# Patient Record
Sex: FEMALE | Race: WHITE | NOT HISPANIC OR LATINO | URBAN - METROPOLITAN AREA
[De-identification: names, ages, dates, MRNs, and addresses within clinical notes are randomized per-mention and may not be internally consistent; named-entity substitution may affect disease eponyms.]

---

## 2018-04-14 ENCOUNTER — INPATIENT (INPATIENT)
Facility: HOSPITAL | Age: 73
LOS: 5 days | Discharge: HOME | DRG: 438 | End: 2018-04-20
Attending: INTERNAL MEDICINE | Admitting: INTERNAL MEDICINE
Payer: MEDICARE

## 2018-04-14 VITALS
WEIGHT: 199.96 LBS | SYSTOLIC BLOOD PRESSURE: 87 MMHG | HEART RATE: 98 BPM | RESPIRATION RATE: 24 BRPM | OXYGEN SATURATION: 96 % | TEMPERATURE: 98 F | DIASTOLIC BLOOD PRESSURE: 59 MMHG

## 2018-04-14 DIAGNOSIS — C79.9 SECONDARY MALIGNANT NEOPLASM OF UNSPECIFIED SITE: ICD-10-CM

## 2018-04-14 LAB
ALBUMIN SERPL ELPH-MCNC: 2.9 G/DL — LOW (ref 3.3–5)
ALP SERPL-CCNC: 1780 U/L — HIGH (ref 40–120)
ALT FLD-CCNC: 61 U/L RC — HIGH (ref 10–45)
ANION GAP SERPL CALC-SCNC: 17 MMOL/L — SIGNIFICANT CHANGE UP (ref 5–17)
APPEARANCE UR: ABNORMAL
APTT BLD: 28.2 SEC — SIGNIFICANT CHANGE UP (ref 27.5–37.4)
AST SERPL-CCNC: 71 U/L — HIGH (ref 10–40)
BACTERIA # UR AUTO: ABNORMAL /HPF
BASE EXCESS BLDV CALC-SCNC: 3.6 MMOL/L — HIGH (ref -2–2)
BASE EXCESS BLDV CALC-SCNC: 6.1 MMOL/L — HIGH (ref -2–2)
BASOPHILS # BLD AUTO: 0 K/UL — SIGNIFICANT CHANGE UP (ref 0–0.2)
BASOPHILS NFR BLD AUTO: 0.4 % — SIGNIFICANT CHANGE UP (ref 0–2)
BILIRUB SERPL-MCNC: 0.8 MG/DL — SIGNIFICANT CHANGE UP (ref 0.2–1.2)
BILIRUB UR-MCNC: NEGATIVE — SIGNIFICANT CHANGE UP
BUN SERPL-MCNC: 28 MG/DL — HIGH (ref 7–23)
CA-I SERPL-SCNC: 1.17 MMOL/L — SIGNIFICANT CHANGE UP (ref 1.12–1.3)
CA-I SERPL-SCNC: 1.18 MMOL/L — SIGNIFICANT CHANGE UP (ref 1.12–1.3)
CALCIUM SERPL-MCNC: 9.8 MG/DL — SIGNIFICANT CHANGE UP (ref 8.4–10.5)
CHLORIDE BLDV-SCNC: 106 MMOL/L — SIGNIFICANT CHANGE UP (ref 96–108)
CHLORIDE BLDV-SCNC: 106 MMOL/L — SIGNIFICANT CHANGE UP (ref 96–108)
CHLORIDE SERPL-SCNC: 98 MMOL/L — SIGNIFICANT CHANGE UP (ref 96–108)
CO2 BLDV-SCNC: 32 MMOL/L — HIGH (ref 22–30)
CO2 BLDV-SCNC: 34 MMOL/L — HIGH (ref 22–30)
CO2 SERPL-SCNC: 26 MMOL/L — SIGNIFICANT CHANGE UP (ref 22–31)
COLOR SPEC: YELLOW — SIGNIFICANT CHANGE UP
CREAT SERPL-MCNC: 1.25 MG/DL — SIGNIFICANT CHANGE UP (ref 0.5–1.3)
DIFF PNL FLD: ABNORMAL
EOSINOPHIL # BLD AUTO: 0.2 K/UL — SIGNIFICANT CHANGE UP (ref 0–0.5)
EOSINOPHIL NFR BLD AUTO: 2.4 % — SIGNIFICANT CHANGE UP (ref 0–6)
EPI CELLS # UR: SIGNIFICANT CHANGE UP /HPF
GAS PNL BLDV: 133 MMOL/L — LOW (ref 136–145)
GAS PNL BLDV: 136 MMOL/L — SIGNIFICANT CHANGE UP (ref 136–145)
GAS PNL BLDV: SIGNIFICANT CHANGE UP
GLUCOSE BLDV-MCNC: 60 MG/DL — LOW (ref 70–99)
GLUCOSE BLDV-MCNC: 69 MG/DL — LOW (ref 70–99)
GLUCOSE SERPL-MCNC: 60 MG/DL — LOW (ref 70–99)
GLUCOSE UR QL: NEGATIVE — SIGNIFICANT CHANGE UP
HCO3 BLDV-SCNC: 30 MMOL/L — HIGH (ref 21–29)
HCO3 BLDV-SCNC: 32 MMOL/L — HIGH (ref 21–29)
HCT VFR BLD CALC: 36 % — SIGNIFICANT CHANGE UP (ref 34.5–45)
HCT VFR BLDA CALC: 33 % — LOW (ref 39–50)
HCT VFR BLDA CALC: 36 % — LOW (ref 39–50)
HGB BLD CALC-MCNC: 10.8 G/DL — LOW (ref 11.5–15.5)
HGB BLD CALC-MCNC: 11.7 G/DL — SIGNIFICANT CHANGE UP (ref 11.5–15.5)
HGB BLD-MCNC: 12 G/DL — SIGNIFICANT CHANGE UP (ref 11.5–15.5)
HYALINE CASTS # UR AUTO: ABNORMAL
INR BLD: 1.17 RATIO — HIGH (ref 0.88–1.16)
KETONES UR-MCNC: NEGATIVE — SIGNIFICANT CHANGE UP
LACTATE BLDV-MCNC: 2.9 MMOL/L — HIGH (ref 0.7–2)
LACTATE BLDV-MCNC: 2.9 MMOL/L — HIGH (ref 0.7–2)
LEUKOCYTE ESTERASE UR-ACNC: ABNORMAL
LIDOCAIN IGE QN: 257 U/L — HIGH (ref 7–60)
LYMPHOCYTES # BLD AUTO: 1.5 K/UL — SIGNIFICANT CHANGE UP (ref 1–3.3)
LYMPHOCYTES # BLD AUTO: 15.7 % — SIGNIFICANT CHANGE UP (ref 13–44)
MCHC RBC-ENTMCNC: 31.3 PG — SIGNIFICANT CHANGE UP (ref 27–34)
MCHC RBC-ENTMCNC: 33.4 GM/DL — SIGNIFICANT CHANGE UP (ref 32–36)
MCV RBC AUTO: 93.8 FL — SIGNIFICANT CHANGE UP (ref 80–100)
MONOCYTES # BLD AUTO: 0.9 K/UL — SIGNIFICANT CHANGE UP (ref 0–0.9)
MONOCYTES NFR BLD AUTO: 9.4 % — SIGNIFICANT CHANGE UP (ref 2–14)
NEUTROPHILS # BLD AUTO: 7 K/UL — SIGNIFICANT CHANGE UP (ref 1.8–7.4)
NEUTROPHILS NFR BLD AUTO: 72.1 % — SIGNIFICANT CHANGE UP (ref 43–77)
NITRITE UR-MCNC: NEGATIVE — SIGNIFICANT CHANGE UP
OTHER CELLS CSF MANUAL: 4 ML/DL — LOW (ref 18–22)
PCO2 BLDV: 55 MMHG — HIGH (ref 35–50)
PCO2 BLDV: 57 MMHG — HIGH (ref 35–50)
PH BLDV: 7.34 — LOW (ref 7.35–7.45)
PH BLDV: 7.38 — SIGNIFICANT CHANGE UP (ref 7.35–7.45)
PH UR: 6.5 — SIGNIFICANT CHANGE UP (ref 5–8)
PLATELET # BLD AUTO: 375 K/UL — SIGNIFICANT CHANGE UP (ref 150–400)
PO2 BLDV: 26 MMHG — SIGNIFICANT CHANGE UP (ref 25–45)
PO2 BLDV: 32 MMHG — SIGNIFICANT CHANGE UP (ref 25–45)
POTASSIUM BLDV-SCNC: 3.9 MMOL/L — SIGNIFICANT CHANGE UP (ref 3.5–5)
POTASSIUM BLDV-SCNC: 4.7 MMOL/L — SIGNIFICANT CHANGE UP (ref 3.5–5)
POTASSIUM SERPL-MCNC: 4.3 MMOL/L — SIGNIFICANT CHANGE UP (ref 3.5–5.3)
POTASSIUM SERPL-SCNC: 4.3 MMOL/L — SIGNIFICANT CHANGE UP (ref 3.5–5.3)
PROT SERPL-MCNC: 7.3 G/DL — SIGNIFICANT CHANGE UP (ref 6–8.3)
PROT UR-MCNC: 100 MG/DL
PROTHROM AB SERPL-ACNC: 12.8 SEC — HIGH (ref 9.8–12.7)
RBC # BLD: 3.84 M/UL — SIGNIFICANT CHANGE UP (ref 3.8–5.2)
RBC # FLD: 14.1 % — SIGNIFICANT CHANGE UP (ref 10.3–14.5)
RBC CASTS # UR COMP ASSIST: SIGNIFICANT CHANGE UP /HPF (ref 0–2)
SAO2 % BLDV: 30 % — LOW (ref 67–88)
SAO2 % BLDV: 52 % — LOW (ref 67–88)
SODIUM SERPL-SCNC: 141 MMOL/L — SIGNIFICANT CHANGE UP (ref 135–145)
SP GR SPEC: 1.02 — SIGNIFICANT CHANGE UP (ref 1.01–1.02)
UROBILINOGEN FLD QL: NEGATIVE — SIGNIFICANT CHANGE UP
WBC # BLD: 9.7 K/UL — SIGNIFICANT CHANGE UP (ref 3.8–10.5)
WBC # FLD AUTO: 9.7 K/UL — SIGNIFICANT CHANGE UP (ref 3.8–10.5)
WBC UR QL: >50 /HPF (ref 0–5)

## 2018-04-14 PROCEDURE — 93308 TTE F-UP OR LMTD: CPT | Mod: 26

## 2018-04-14 PROCEDURE — 74177 CT ABD & PELVIS W/CONTRAST: CPT | Mod: 26

## 2018-04-14 PROCEDURE — 99223 1ST HOSP IP/OBS HIGH 75: CPT

## 2018-04-14 PROCEDURE — 93010 ELECTROCARDIOGRAM REPORT: CPT

## 2018-04-14 PROCEDURE — 71260 CT THORAX DX C+: CPT | Mod: 26

## 2018-04-14 PROCEDURE — 70470 CT HEAD/BRAIN W/O & W/DYE: CPT | Mod: 26

## 2018-04-14 PROCEDURE — 71045 X-RAY EXAM CHEST 1 VIEW: CPT | Mod: 26

## 2018-04-14 PROCEDURE — 99291 CRITICAL CARE FIRST HOUR: CPT

## 2018-04-14 RX ORDER — PIPERACILLIN AND TAZOBACTAM 4; .5 G/20ML; G/20ML
3.38 INJECTION, POWDER, LYOPHILIZED, FOR SOLUTION INTRAVENOUS EVERY 8 HOURS
Qty: 0 | Refills: 0 | Status: DISCONTINUED | OUTPATIENT
Start: 2018-04-14 | End: 2018-04-19

## 2018-04-14 RX ORDER — PIPERACILLIN AND TAZOBACTAM 4; .5 G/20ML; G/20ML
3.38 INJECTION, POWDER, LYOPHILIZED, FOR SOLUTION INTRAVENOUS ONCE
Qty: 0 | Refills: 0 | Status: COMPLETED | OUTPATIENT
Start: 2018-04-14 | End: 2018-04-14

## 2018-04-14 RX ORDER — DEXTROSE 50 % IN WATER 50 %
25 SYRINGE (ML) INTRAVENOUS ONCE
Qty: 0 | Refills: 0 | Status: COMPLETED | OUTPATIENT
Start: 2018-04-14 | End: 2018-04-14

## 2018-04-14 RX ORDER — DEXAMETHASONE 0.5 MG/5ML
4 ELIXIR ORAL EVERY 6 HOURS
Qty: 0 | Refills: 0 | Status: DISCONTINUED | OUTPATIENT
Start: 2018-04-14 | End: 2018-04-19

## 2018-04-14 RX ORDER — SODIUM CHLORIDE 9 MG/ML
1000 INJECTION INTRAMUSCULAR; INTRAVENOUS; SUBCUTANEOUS ONCE
Qty: 0 | Refills: 0 | Status: COMPLETED | OUTPATIENT
Start: 2018-04-14 | End: 2018-04-14

## 2018-04-14 RX ORDER — ACETAMINOPHEN 500 MG
1000 TABLET ORAL ONCE
Qty: 0 | Refills: 0 | Status: COMPLETED | OUTPATIENT
Start: 2018-04-14 | End: 2018-04-14

## 2018-04-14 RX ORDER — IPRATROPIUM/ALBUTEROL SULFATE 18-103MCG
3 AEROSOL WITH ADAPTER (GRAM) INHALATION ONCE
Qty: 0 | Refills: 0 | Status: COMPLETED | OUTPATIENT
Start: 2018-04-14 | End: 2018-04-14

## 2018-04-14 RX ADMIN — Medication 400 MILLIGRAM(S): at 13:57

## 2018-04-14 RX ADMIN — Medication 25 MILLILITER(S): at 16:03

## 2018-04-14 RX ADMIN — Medication 4 MILLIGRAM(S): at 22:45

## 2018-04-14 RX ADMIN — Medication 3 MILLILITER(S): at 13:57

## 2018-04-14 RX ADMIN — SODIUM CHLORIDE 1000 MILLILITER(S): 9 INJECTION INTRAMUSCULAR; INTRAVENOUS; SUBCUTANEOUS at 14:21

## 2018-04-14 RX ADMIN — PIPERACILLIN AND TAZOBACTAM 200 GRAM(S): 4; .5 INJECTION, POWDER, LYOPHILIZED, FOR SOLUTION INTRAVENOUS at 16:57

## 2018-04-14 RX ADMIN — SODIUM CHLORIDE 1000 MILLILITER(S): 9 INJECTION INTRAMUSCULAR; INTRAVENOUS; SUBCUTANEOUS at 16:57

## 2018-04-14 RX ADMIN — Medication 1000 MILLIGRAM(S): at 16:35

## 2018-04-14 NOTE — CONSULT NOTE ADULT - SUBJECTIVE AND OBJECTIVE BOX
72F past medical history ovarian CA s/p hysterectomy 10 years ago, pre-diabetic, HTN, COPD, biliary stent with fever, peritoneal carcinomatosis presents from home after RN saw patient with shortness of breath and blurry vision. Patient c/o abdominal distension. Denies fever. Blurry vision has been occurring for past month, patient has been having increasing difficulty with reading at home. Daughter at bedside.    CTH: left temporal vasogenic edema, small focus    MEDICATIONS  (STANDING):  dexamethasone     Tablet 4 milliGRAM(s) Oral every 6 hours    ICU Vital Signs Last 24 Hrs  T(C): 36.9 (14 Apr 2018 18:27), Max: 37.1 (14 Apr 2018 16:56)  T(F): 98.4 (14 Apr 2018 18:27), Max: 98.8 (14 Apr 2018 16:56)  HR: 83 (14 Apr 2018 18:27) (83 - 98)  BP: 123/77 (14 Apr 2018 18:27) (87/59 - 123/77)  BP(mean): --  ABP: --  ABP(mean): --  RR: 18 (14 Apr 2018 18:27) (18 - 24)  SpO2: 97% (14 Apr 2018 18:27) (96% - 97%)        Exam:  Alert, oriented x 3  EOMI  PERRLA  FC  No pronator drift  5/5 UE and LE  abdominal distension

## 2018-04-14 NOTE — ED PROVIDER NOTE - PROGRESS NOTE DETAILS
Attending Vasquez: pt awaiting CT scans, afebrile. Attending Vasquez: UA positive for infection, given abx Braden PGY3: ct concerning for cholecystitis, surgery consulted and will likely recommend perc choley with IR. COncern for metastatic disease on CT head. neurosurg called and will see the patient and likely recommend steroids as per phone conversation Braden PGY3: after numerous attempts to contact Dr. Pablo, he called back as I was in a code acls. unable to talk to him. awaiting Dr. Pablo for admission Braden PGY3: admitted to [facility] and informed of the consults called and plan.

## 2018-04-14 NOTE — CONSULT NOTE ADULT - ASSESSMENT
72F with metastatic ovarian ca with biliary colic s/p CBD stent  -medicine consult  -NPO/IVF  -antibiotics  -abdominal exams  -Given advanced/metastatic disease, patient is a poor surgical candidate. If patient's pain/abdominal exam does not improve on antibiotics, will need IR percutaneous cholecystostomy.  -will follow  -d/w Dr. Cool 72F with metastatic ovarian ca with biliary colic s/p CBD stent  -medicine consult  -GI consult given biliary stent/recent procedure and abdominal pain began after stent placement  -NPO/IVF  -antibiotics  -abdominal exams  -Given advanced/metastatic disease, patient is a poor surgical candidate. If patient's pain/abdominal exam does not improve on antibiotics, will need IR percutaneous cholecystostomy.  -will follow  -d/w Dr. Cool

## 2018-04-14 NOTE — ED PROVIDER NOTE - CARE PLAN
Principal Discharge DX:	Pleural effusion  Secondary Diagnosis:	Cholecystitis  Secondary Diagnosis:	Metastatic disease

## 2018-04-14 NOTE — ED PROVIDER NOTE - MEDICAL DECISION MAKING DETAILS
Attending Vasquez: 71 y/o female with h/o metastatic cancer, copd on 3L home oxygen, s/p recent biliary stent presenting with abdominal pain. pain diffuse, worse in suprapubic area. reportedly had some sob earlier but currently resolved. no fevers or productive cough. initial BP in triage decreased however when recheck improved. on exam pt with distended abd, ttp RUQ and suprapubic area. pocus shows multiple gallstones and gbwt however pt without sonographic murphys. will obtain ct to further evaluate. additionally tp with sojme dysuria. UA ordered to further evaluate. less likely pe as no pleuritic pain and sob currently resolved. additionally pt reports transient blurry vision. with h/o metastatic ca will obtain ct head to evaluate for intracranial mets. will obtain labs, ct ab/pel, ua, consider HIDA. pt likely tba

## 2018-04-14 NOTE — H&P ADULT - NSHPLABSRESULTS_GEN_ALL_CORE
Labs personally reviewed.                        12.0   9.7   )-----------( 375      ( 2018 13:59 )             36.0     -    141  |  98  |  28<H>  ----------------------------<  60<L>  4.3   |  26  |  1.25    Ca    9.8      2018 13:59    TPro  7.3  /  Alb  2.9<L>  /  TBili  0.8  /  DBili  x   /  AST  71<H>  /  ALT  61<H>  /  AlkPhos  1780<H>          LIVER FUNCTIONS - ( 2018 13:59 )  Alb: 2.9 g/dL / Pro: 7.3 g/dL / ALK PHOS: 1780 U/L / ALT: 61 U/L RC / AST: 71 U/L / GGT: x           PT/INR - ( 2018 13:59 )   PT: 12.8 sec;   INR: 1.17 ratio         PTT - ( 2018 13:59 )  PTT:28.2 sec  Urinalysis Basic - ( 2018 16:55 )    Color: Yellow / Appearance: SL Turbid / S.024 / pH: x  Gluc: x / Ketone: Negative  / Bili: Negative / Urobili: Negative   Blood: x / Protein: 100 mg/dL / Nitrite: Negative   Leuk Esterase: Large / RBC: 3-5 /HPF / WBC >50 /HPF   Sq Epi: x / Non Sq Epi: OCC /HPF / Bacteria: Few /HPF      Imaging personally reviewed.      Tracing personally reviewed. Labs personally reviewed.                        12.0   9.7   )-----------( 375      ( 2018 13:59 )             36.0     -    141  |  98  |  28<H>  ----------------------------<  60<L>  4.3   |  26  |  1.25    Ca    9.8      2018 13:59    TPro  7.3  /  Alb  2.9<L>  /  TBili  0.8  /  DBili  x   /  AST  71<H>  /  ALT  61<H>  /  AlkPhos  1780<H>      LIVER FUNCTIONS - ( 2018 13:59 )  Alb: 2.9 g/dL / Pro: 7.3 g/dL / ALK PHOS: 1780 U/L / ALT: 61 U/L RC / AST: 71 U/L / GGT: x         PT/INR - ( 2018 13:59 )   PT: 12.8 sec;   INR: 1.17 ratio      PTT - ( 2018 13:59 )  PTT:28.2 sec  Urinalysis Basic - ( 2018 16:55 )    Color: Yellow / Appearance: SL Turbid / S.024 / pH: x  Gluc: x / Ketone: Negative  / Bili: Negative / Urobili: Negative   Blood: x / Protein: 100 mg/dL / Nitrite: Negative   Leuk Esterase: Large / RBC: 3-5 /HPF / WBC >50 /HPF   Sq Epi: x / Non Sq Epi: OCC /HPF / Bacteria: Few /HPF    Imaging personally reviewed.      Tracing personally reviewed. Labs personally reviewed.                        12.0   9.7   )-----------( 375      ( 2018 13:59 )             36.0     -    141  |  98  |  28<H>  ----------------------------<  60<L>  4.3   |  26  |  1.25    Ca    9.8      2018 13:59    TPro  7.3  /  Alb  2.9<L>  /  TBili  0.8  /  DBili  x   /  AST  71<H>  /  ALT  61<H>  /  AlkPhos  1780<H>      LIVER FUNCTIONS - ( 2018 13:59 )  Alb: 2.9 g/dL / Pro: 7.3 g/dL / ALK PHOS: 1780 U/L / ALT: 61 U/L RC / AST: 71 U/L / GGT: x         PT/INR - ( 2018 13:59 )   PT: 12.8 sec;   INR: 1.17 ratio      PTT - ( 2018 13:59 )  PTT:28.2 sec  Urinalysis Basic - ( 2018 16:55 )    Color: Yellow / Appearance: SL Turbid / S.024 / pH: x  Gluc: x / Ketone: Negative  / Bili: Negative / Urobili: Negative   Blood: x / Protein: 100 mg/dL / Nitrite: Negative   Leuk Esterase: Large / RBC: 3-5 /HPF / WBC >50 /HPF   Sq Epi: x / Non Sq Epi: OCC /HPF / Bacteria: Few /HPF    Imaging personally reviewed.      CHEST:     LUNGS AND LARGE AIRWAYS: Trachea and mainstem bronchi are patent.   Compressive atelectasis of the right lower lobe secondary to pleural   fluid. Mild interlobular septal thickening of the upper lobes. Mild soft   tissue thickening along the right major fissure, possibly atelectasis   secondary to adjacent pleural fluid. Juxtapleural solid nodule of the   left lower lobe measuring 7 mm, series 2 image 40. Calcified granuloma   left upper lobe.  PLEURA: Moderate right pleural effusion and trace left pleural effusion,   new since 2013..  VESSELS: Mediport tip at the distal SVC. Atheromatous changes of the   thoracic aorta and coronary arteries.  HEART: Heart size is normal. No pericardial effusion. Aortic valve   calcification.  MEDIASTINUM AND RAMIRO: Less than 1 cm mediastinal lymph nodes  CHEST WALL AND LOWER NECK: Right chest wall Mediport.    ABDOMEN AND PELVIS:    LIVER: Heterogeneous enhancement adjacent to the gallbladder fossa.   Peripheral branching air may represent pneumobilia versus, somewhat less   likely, portal venous gas.   BILE DUCTS: Largely air-filled CBD stent. Distally near the ampulla, the   stent is filled with fluid or soft tissue. Trace left-sided pneumobilia.   GALLBLADDER: Filled with stones, one of which appears to be in the neck   of the gallbladder.  Thickened wall.  SPLEEN: Within normal limits.  PANCREAS: Marked peripancreatic inflammatory change. Area of low   attenuation along the anterior margin of the pancreatic body measures 3.1   x 2.0 cm, series 2 image 86, indeterminate for serosal metastases versus   loculated peripancreatic fluid.  ADRENALS: Enlarged bilateral adrenal glands with low-attenuation lesions   measuring 2.4 x 2.0 cm on the left and 1.7 x 1.5 cm on the right, highly   concerning for metastases.  KIDNEYS/URETERS: No hydronephrosis.    BLADDER: Within normal limits.  REPRODUCTIVE ORGANS: Hysterectomy. No adnexal mass.    BOWEL: No significant bowel distention or discrete transition. The   descending colon is largely fluid-filled. Mild irregular thickening along   the hepatic flexure and proximal transverse colon may be sequelae of   serosal disease. No evidence of bowel pneumatosis. Colonic diverticulosis   without diverticulitis. Normal appendix.  PERITONEUM: Moderate ascites. Soft tissue nodularity along the anterior   omental soft tissue, particularly in the left upper quadrant is   concerning for carcinomatosis.  VESSELS:  Patent main portal vein, SMV and splenic vein. Atheromatous   changes of the abdominal aorta.  RETROPERITONEUM: Previously described enlarged retroperitoneal nodes are   markedly decreased in size as compared to prior study from 2013.   Left para-aortic lymph node node conglomerate measures 2.1 x 1.4 cm,   series 2 image 105, previously 3.4 x 2.6 cm.    ABDOMINAL WALL: Within normal limits.  BONES: Diffuse osseous demineralization and degenerative change.    Tracing personally reviewed.

## 2018-04-14 NOTE — H&P ADULT - PROBLEM SELECTOR PLAN 1
--NPO for now  --pain control with morphine 2mg IV q4h PRN for moderate, 4mg IV q4h PRN for severe, tylenol for mild  --patient received 2L NS in ED. Continue NS @ 125, no signs of heart failure.   --appreciate surgery c/s, should consult IR in AM for perc cholecystostomy  --at the moment, etiology of obstruction is unclear. Should also consider GI c/s. --NPO for now  --pain control with morphine 2mg IV q4h PRN for moderate, 4mg IV q4h PRN for severe, tylenol for mild  --patient received 2L NS in ED. Continue NS @ 125, no signs of heart failure.   --holding lasix for now  --appreciate surgery c/s, should consult IR in AM for perc cholecystostomy  --at the moment, etiology of obstruction is unclear. Should also consider GI c/s.

## 2018-04-14 NOTE — CONSULT NOTE ADULT - ATTENDING COMMENTS
I reviewed the resident's note and agree. The patient is a 72-year-old female with a history of metastatic ovarian cancer s/p resection and chemotherapy who presents with blurry vision, shortness of breath, and abdominal pain, found via CT to have a hyperdense left temporal lobe lesion with vasogenic edema suspicious for a metastasis, right greater than left pleural effusions, and abdominal ascites concerning for peritoneal carcinomatosis. The patient is currently neurologically stable. No acute neurosurgical intervention is indicated at this time. Recommend an MRI of the brain with and without contrast for further evaluation of the left temporal lobe lesion. Recommend steroids to decrease vasogenic edema. F/U Oncology and Radiation Oncology recommendations. I saw and evaluated the patient. I reviewed the resident's note and agree. The patient is a 72-year-old female with a history of metastatic ovarian cancer s/p resection and chemotherapy who presents with blurry vision, shortness of breath, and abdominal pain, found via CT to have a hyperdense left temporal lobe lesion with vasogenic edema suspicious for a metastasis, right greater than left pleural effusions, and abdominal ascites concerning for peritoneal carcinomatosis. The patient is currently neurologically stable. No acute neurosurgical intervention is indicated at this time. Recommend an MRI of the brain with and without contrast for further evaluation of the left temporal lobe lesion. Recommend steroids to decrease vasogenic edema. F/U Oncology and Radiation Oncology recommendations.

## 2018-04-14 NOTE — ED PROVIDER NOTE - PHYSICAL EXAMINATION
Attending Vasquez: Gen: NAD, heent: atrauamtic, eomi, perrla, mmm, op pink, uvula midline, neck; nttp, no nuchal rigidity, chest: nttp, no crepitus, cv: rrr, no murmurs, lungs: decreased bs b/l, abd: soft, distended ttp umbilical areas, +BS, no guarding, ext: wwp, neg homans, skin: no rash, neuro: awake and alert, following commands, speech clear, sensation and strength intact, no focal deficits

## 2018-04-14 NOTE — H&P ADULT - ASSESSMENT
This is a 72 year old female with metastatic ovarian cancer (Dx 2008, s/p DENNY/BSO, chemo with recurrence 2017 and repeat chemo, last 12/2017), COPD, presenting with multiple complaints, including abdominal pain, shortness of breath and blurry vision.  Regarding abdominal pain, patient with multiple possible etiologies for pain, including biliary colic given stones present and in neck, pancreatitis which is likely also 2/2 cholestasis, and evolving ascites which is likely malignant. Etiology of cholestasis is unclear, possibly gallstone pancreatitis given GB findings. Per radiology, no obvious obstructing lesions at the moment. Suspect that pneumobilia is 2/2 stent and not a reflection of infection given clinical presentation, though GB wall thickening could suggest cholecystitis. Shortness of breath is likely related to evolving pleural effusion, and given no discreet R sided pulmonary lesion, suspect that pleural effusion may be continuous with abdominal ascites. Patient with new intracranial lesion c/f metastasis.

## 2018-04-14 NOTE — CONSULT NOTE ADULT - ATTENDING COMMENTS
I saw and examined the pt and discussed the tx plan with the House Staff. I agree with the exam and plan as documented in the above consult with comments below.  It appears reasonable that a perc lexi tube would improve the pt's symptoms (given large stone at the neck), however it may cause issues with leaking ascites and superinfection of the ascites. In addition, it is possible that the pt's pain is d/t peritoneal carcinomatosis. Would get IR input in am.   Wendy Keyes MD I saw and examined the pt and discussed the tx plan with the House Staff. I agree with the exam and plan as documented in the above consult with comments below.  It appears reasonable that a perc lexi tube should address the gallbladder issue, however it may cause problems with leaking ascites and superinfection of the ascites. Of note, the pt is mainly c/o lower abd pain and bloating, which is likely d/t her ascites and peritoneal carcinomatosis. Blood cx with GNR. Given overall not straightforward picture, I would obtain a HIDA. Would also get IR input in am. It is possible that paracentesis may help with her symptoms as well.   Wendy Keyes MD

## 2018-04-14 NOTE — CONSULT NOTE ADULT - SUBJECTIVE AND OBJECTIVE BOX
General Surgery Consult  Consulting surgical team: Green team surgery [phone]  Consulting attending: Dr. Omer Cool    HPI: 72F with metastatic recurrent ovarian cancer, COPD, presenting with abdominal pain. Patient reports she was previously admitted to [facility] for 'gallbladder pain'. At that time surgery and GI were consulted and decision was made to place biliary stent. Patient also had jaundice which resolved after stent placement. Since placement of her biliary stent (3 weeks ago) patient has been having abdominal pain. Pain is no worse but she presents today due to ongoing pain. Denies nausea/vomiting or worsening pain with PO intake. Reports decreased appetite and weakness, 10 lb weight loss over the past month. No fevers/chills. Per daughter patient would like second opinion so she came to [facility] rather than returning to [facility].    Patient was diagnosed with ovarian ca approx 10 yrs ago and underwent TAHBSO and chemo. She was found to have recurrent disease last year and underwent chemo which ended Dec 2017. Patient is supposed to get chemo but is pending resolution of her gallbladder symptoms.     PAST MEDICAL HISTORY:  Ovarian ca s/p TAHBSO and chemo 10yrs ago now with recurrent disease  COPD    PAST SURGICAL HISTORY:  TAHBSO    MEDICATIONS:  dexamethasone     Tablet 4 milliGRAM(s) Oral every 6 hours      ALLERGIES:  No Known Allergies      VITALS & I/Os:  Vital Signs Last 24 Hrs  T(C): 36.9 (2018 18:27), Max: 37.1 (2018 16:56)  T(F): 98.4 (2018 18:27), Max: 98.8 (2018 16:56)  HR: 83 (2018 18:27) (83 - 98)  BP: 123/77 (2018 18:27) (87/59 - 123/77)  BP(mean): --  RR: 18 (2018 18:27) (18 - 24)  SpO2: 97% (2018 18:27) (96% - 97%)    PHYSICAL EXAM:  General: No acute distress  Respiratory: Nonlabored  Cardiovascular: Regular rate and rhythm.   Abdominal: Soft, nondistended, minimally tender in RUQ and lower abdomen. No rebound or guarding. No organomegaly, no palpable mass.  Extremities: Warm    LABS:                        12.0   9.7   )-----------( 375      ( 2018 13:59 )             36.0         141  |  98  |  28<H>  ----------------------------<  60<L>  4.3   |  26  |  1.25    Ca    9.8      2018 13:59    TPro  7.3  /  Alb  2.9<L>  /  TBili  0.8  /  DBili  x   /  AST  71<H>  /  ALT  61<H>  /  AlkPhos  1780<H>      Lactate:   @ 15:43  2.9   @ 13:59  2.9    PT/INR - ( 2018 13:59 )   PT: 12.8 sec;   INR: 1.17 ratio         PTT - ( 2018 13:59 )  PTT:28.2 sec          Urinalysis Basic - ( 2018 16:55 )    Color: Yellow / Appearance: SL Turbid / S.024 / pH: x  Gluc: x / Ketone: Negative  / Bili: Negative / Urobili: Negative   Blood: x / Protein: 100 mg/dL / Nitrite: Negative   Leuk Esterase: Large / RBC: 3-5 /HPF / WBC >50 /HPF   Sq Epi: x / Non Sq Epi: OCC /HPF / Bacteria: Few /HPF        IMAGING:  CT Abdomen and Pelvis w/ Oral Cont and w/ IV Cont (18 @ 16:46) >  IMPRESSION:     No bowel obstruction.    Peripancreatic inflammatory change with 3.1 x 2.0 cm low-density lesion   along the anterior body concerning for serosal metastasis. Acute   pancreatitis could have a similar appearance. Correlate with physical   exam and lipase level.    Distended gallbladder with numerous stones, one of which appears to be in   the neck of the gallbladder. Thickening of the gallbladder wall.   Ultrasound or DISIDA scan may be obtained to assess for acute   cholecystitis.    Pneumobilia, consistent with the presence of a biliary stent. Additional   peripheral linear foci of gas in the left lobe of the liver may also be   related to pneumobilia versus, somewhat less likely, portal venous gas.     Bilateral adrenal masses, suspicious for metastatic disease.    Moderate ascites. Concern for peritoneal carcinomatosis.    Moderate right pleural effusion with associated compressiveatelectasis.

## 2018-04-14 NOTE — H&P ADULT - NSHPREVIEWOFSYSTEMS_GEN_ALL_CORE
CONSTITUTIONAL: No weakness, fevers or chills  EYES/ENT: No visual changes;  No dysphagia  NECK: No pain or stiffness  RESPIRATORY: No cough, wheezing, hemoptysis; No shortness of breath  CARDIOVASCULAR: No chest pain or palpitations; No lower extremity edema  GASTROINTESTINAL: No abdominal or epigastric pain. No nausea, vomiting, or hematemesis; No diarrhea or constipation. No melena or hematochezia.  BACK: No back pain  GENITOURINARY: No dysuria, frequency or hematuria  NEUROLOGICAL: No numbness or weakness  SKIN: No itching, burning, rashes, or lesions   All other review of systems is negative unless indicated above. CONSTITUTIONAL: No weakness, fevers or chills  EYES/ENT: +visual changes;  No dysphagia  NECK: No pain or stiffness  RESPIRATORY: No cough, wheezing, hemoptysis; +shortness of breath  CARDIOVASCULAR: No chest pain or palpitations; No lower extremity edema  GASTROINTESTINAL: +abdominal -epigastric pain. No nausea, vomiting, or hematemesis; No diarrhea, +constipation. No melena or hematochezia.  BACK: No back pain  GENITOURINARY: No dysuria, frequency or hematuria  NEUROLOGICAL: No numbness or weakness  SKIN: No itching, burning, rashes, or lesions   All other review of systems is negative unless indicated above.

## 2018-04-14 NOTE — H&P ADULT - PROBLEM SELECTOR PLAN 3
--suspect that this may be continuous with abdominal ascites  --consider diagnostic/therpeutic thora, could also address with IR  --no consolidation to suggest PNA, parapneumonic effusion

## 2018-04-14 NOTE — CONSULT NOTE ADULT - ASSESSMENT
72F with history of ovarian CA p/w abdominal ascities / peritoneal carcinomatosis, CTH shows evidence of left temporal vasogenic edema  -medicine evaluation appreciated  -MRI w/ wo contrast when possible of head  -decadron 4Q6  -radiation/oncology evaluation

## 2018-04-14 NOTE — ED ADULT NURSE NOTE - OBJECTIVE STATEMENT
71 yo female presents to the ED from home c/o SOB, blurry vision and abdominal pain and distention today. patient states she woke up this morning feeling SOB and checked O2 which was 87% on RA with blurry vision. patient arrives to the ED with O2= 96% on 3L NC. patient states she uses home O2 as needed, 3L NC. NSR on the monitor with RR= 22-25. patient is AAOx3. lung sounds wheezes bilaterally. cap refill <3sec. patient c/o upper left and bilateral lower abdominal pain 4/10 with tenderness to touch and distention noted. last BM was yesterday, constipated as per patient. patient also c/o dysuria. patient states she had biliary stent placed on 3/21. patient has PMH ovarian cancer diagnosed 10 years ago. last chemo was March 2017. port of upper right chest wall was last accessed March 2017. patient denies fevers, chills, N/V/D, HA, dizziness, chest pain, cough, hematuria, blood in stool. MD at the bedside.

## 2018-04-14 NOTE — H&P ADULT - HISTORY OF PRESENT ILLNESS
This is a 72 year old female with metastatic ovarian cancer (Dx 2008, s/p DENNY/BSO, chemo with recurrence 2017 and repeat chemo, last 12/2017), COPD, presenting with abdominal pain. Patient was recently admitted to [facility] for 'gallbladder pain'. and jaundice. At that time surgery and GI were consulted and biliary stent was placed.  Since placement of her biliary stent (3 weeks ago) patient has been having persistent abdominal pain. Denies nausea/vomiting or worsening pain with PO intake. Reports decreased appetite and weakness,10 lb weight loss over the past month. Denies fevers/chills. Patient does note blurry vision for the past month, increasing difficulty with reading at home.    In ED, Tmax 98.8, HR 81, BP 87//77, satting well on RA. Labs notable for WBC 9.7, Hgb 12, Plt 375, BUN 28, Cr 1.25 (previous BL 0.7), LFT notable for albumin 2.9, AST 71, ALT 61, Tbili 0.8, AP 1780, INR 1.17, UA with SG 1.024, large LE, >50 WBC. CT head, chest, abdomen/pelvis showed peripancreatic inflammatory change with 3.1 x 2.0 cm low-density lesion along the anterior body concerning for serosal metastasis, acute   pancreatitis could have a similar appearance, distended gallbladder with numerous stones, one of which appears to be in the neck of the gallbladder, thickening of the gallbladder wall, pneumobilia, consistent with the presence of a biliary stent,   peripheral linear foci of gas in the left lobe of the liver may also be   related to pneumobilia versus, somewhat less likely, portal venous gas, blateral adrenal masses, suspicious for metastatic disease, moderate ascites c/f peritoneal carcinomatosis, moderate right pleural effusion with associated compressive atelectasis. Patient was seen by neurosurgery, general surgery. Received zosyn, 2L NS, IV tylenol, duonebs, decadron 4mg q6h. This is a 72 year old female with metastatic ovarian cancer (Dx 2008, s/p DENNY/BSO, chemo with recurrence 2017 and repeat chemo, last 12/2017), COPD, presenting with abdominal pain. Patient was recently admitted to [facility] for 'gallbladder pain'. and jaundice. At that time surgery and GI were consulted and biliary stent was placed.  Since placement of her biliary stent (3 weeks ago) patient has been having persistent abdominal pain. Denies nausea/vomiting or worsening pain with PO intake. Reports decreased appetite and weakness,10 lb weight loss over the past month. Denies fevers/chills. Patient does note blurry vision for the past month, increasing difficulty with reading at home.    In ED, Tmax 98.8, HR 81, BP 87//77, satting well on RA. Labs notable for WBC 9.7, Hgb 12, Plt 375, BUN 28, Cr 1.25 (previous BL 0.7), LFT notable for albumin 2.9, AST 71, ALT 61, Tbili 0.8, AP 1780, INR 1.17, UA with SG 1.024, large LE, >50 WBC. CT head, chest, abdomen/pelvis with multiple findings including peripancreatic changes with low densitry 3x2 cm lesion c/w metastatis vs. pancreatitis, distended GB with stone at the neck of the GB with pneumobilia, bilateral adrenal masses suspicious for metastatic disease, moderate ascites c/f peritoneal carcinomatosis, moderate right pleural effusion with associated compressive atelectasis. Patient was seen by neurosurgery, general surgery. Received zosyn, 2L NS, IV tylenol, duonebs, decadron 4mg q6h. This is a 72 year old female with metastatic ovarian cancer (Dx 2008, s/p DENNY/BSO, chemo with recurrence 2017 and repeat chemo, last 12/2017), COPD, presenting with multiple complaints, including abdominal pain, shortness of breath and blurry vision. Patient was recently admitted to [facility] for abdominal pain and jaundice. She was found at the time to be cholestatic, had biliary stent placed, was discharged to [facility] and then to her daughter's home 4/12.  She notes that her jaundice improved after stent placement, but her abdominal pain has persisted. She describes the abdominal pain as intermittent 7/10 diffuse pain, worse in the supraumbilical/L flank regions, worse with deep breath and cough, non-radiating, not related to food.  Notes chronic constipation, denies diarrhea, notes that prior to stent placement she had some nausea and vomiting, but that has improved since. She also notes generalized fatigue, poor appetite, and shortness of breath. She is also complaining of blurry vision x 1 month which has worsened over the past days, seems worse in the sunlight. Denies fevers, chills.      Regarding cancer history, patient was diagnosed with ovarian cancer 2008, underwent 4 rounds of chemo and DENNY/BSO. She has recurrence of her cancer 2017 and underwent 6 rounds of chemo, last October.     In ED, Tmax 98.8, HR 81, BP 87//77, satting well on RA. Labs notable for WBC 9.7, Hgb 12, Plt 375, BUN 28, Cr 1.25 (previous BL 0.7), LFT notable for albumin 2.9, AST 71, ALT 61, Tbili 0.8, AP 1780, INR 1.17, UA with SG 1.024, large LE, >50 WBC. CT head, chest, abdomen/pelvis with multiple findings including peripancreatic changes with low densitry 3x2 cm lesion c/w metastatis vs. pancreatitis, distended GB with stone at the neck of the GB with pneumobilia, bilateral adrenal masses suspicious for metastatic disease, moderate ascites c/f peritoneal carcinomatosis, moderate right pleural effusion with associated compressive atelectasis. Patient was seen by neurosurgery, general surgery. Received zosyn, 2L NS, IV tylenol, duonebs, decadron 4mg q6h.

## 2018-04-14 NOTE — ED PROVIDER NOTE - OBJECTIVE STATEMENT
72 year old, presenting from home after visiting nurse saw patient shortness of breath and some blurry vision. Patient found to be hypotensive in ER and taken to Critical care A. Recent history of Biliary stent after fever and new dx of metastatic ovarian cancer from 10 years ago. complaining of distended abdomen and has never had ascites tapped yet.     PMD: ovarian ca  with hysterectomy 10 years ago, pre diabetic, HTN, copd on 3LMP at home, peritoneal cancer with recent GB stent at 3/21 at [facility].     Surg: Raymundo 72 year old, presenting from home after visiting nurse saw patient shortness of breath and some blurry vision. Patient found to be hypotensive in ER and taken to Critical care A. Recent history of Biliary stent after fever and new dx of metastatic ovarian cancer from 10 years ago. complaining of distended abdomen and has never had ascites tapped yet.   Attending Vasquez: agree with above. additionally pt denies any fevers. pain located mostly in suprapubic area and describes some difficulty with urinating. no pain with inspiration or sob currently. pt does have h/o COPD on 3L. no cough or fevers. no headaches. does report some blurry vision    PMD: ovarian ca  with hysterectomy 10 years ago, pre diabetic, HTN, copd on 3LMP at home, peritoneal cancer with recent GB stent at 3/21 at [facility].     Surg: Fillos

## 2018-04-14 NOTE — ED PROCEDURE NOTE - PROCEDURE ADDITIONAL DETAILS
POCUS: gallstones, wall thickening, ascites present, cannot rule out acute cholecystitis POCUS: gallstones, wall thickening, ascites present, cannot rule out acute cholecystitis, pneumobilia

## 2018-04-14 NOTE — ED ADULT NURSE REASSESSMENT NOTE - NS ED NURSE REASSESS COMMENT FT1
patient resting comfortably in bed in no acute distress. patient denies pain at this time. waiting for Ct results. patient aware.

## 2018-04-14 NOTE — H&P ADULT - NSHPPHYSICALEXAM_GEN_ALL_CORE
GENERAL: NAD, well-developed  HEAD:  atraumatic, normocephalic  ENT: EOMI, PERRLA, conjunctiva and sclera clear, neck supple, no JVD, moist mucosa, no LAD, no thyromegaly  CHEST/LUNG: CTAB; no wheezes, rales, rhonchi  BACK: no spinal tenderness  HEART: RRR, no murmurs, rubs, or gallops  ABDOMEN: NABS, soft, nontender, nondistended  EXTREMITIES:  no clubbing, cyanosis, or edema  PSYCH: normal behavior, normal affect, euthymic  NEUROLOGY: AAOx3, non-focal, CN 2-12 intact  SKIN: normal color, no rashes or lesions GENERAL: NAD, well-developed  HEAD:  atraumatic, normocephalic  ENT: EOMI, PERRLA, conjunctiva and sclera clear, neck supple, no JVD, moist mucosa, no LAD, no thyromegaly  CHEST/LUNG: CTAB; no wheezes, rales, rhonchi, decreased breath sounds RLL  BACK: no spinal tenderness  HEART: RRR, no murmurs, rubs, or gallops  ABDOMEN: NABS, distended, +fluid wave, mild TTP supraumbilical, LLQ, no RUQ pain, negative vicente, ?epigastric pain  EXTREMITIES:  no clubbing, cyanosis, or edema  PSYCH: normal behavior, normal affect, euthymic  NEUROLOGY: AAOx3, non-focal, CN 2-12 intact  SKIN: normal color, no rashes or lesions

## 2018-04-14 NOTE — H&P ADULT - PROBLEM SELECTOR PLAN 2
--patient with evidence of pneumobilia, likely 2/2   --patient is very stable, doubt infection at the current time  --that said, given GB wall thickening, pneumobilia with recent stent placement and persistent pain, will continue zosyn for now pending continued work-up  --recommend GI consult  --continue to trend daily LFTs  --pancreatitis treatment as above  --should get collateral from [facility]

## 2018-04-14 NOTE — H&P ADULT - PROBLEM SELECTOR PLAN 5
--agree with MRI  --appreciate neurosurgery consult  --will continue decodron 4mg IV q6h, ISS  --MRI

## 2018-04-15 DIAGNOSIS — E11.9 TYPE 2 DIABETES MELLITUS WITHOUT COMPLICATIONS: ICD-10-CM

## 2018-04-15 DIAGNOSIS — I10 ESSENTIAL (PRIMARY) HYPERTENSION: ICD-10-CM

## 2018-04-15 DIAGNOSIS — R18.8 OTHER ASCITES: ICD-10-CM

## 2018-04-15 DIAGNOSIS — K85.10 BILIARY ACUTE PANCREATITIS WITHOUT NECROSIS OR INFECTION: ICD-10-CM

## 2018-04-15 DIAGNOSIS — Z29.9 ENCOUNTER FOR PROPHYLACTIC MEASURES, UNSPECIFIED: ICD-10-CM

## 2018-04-15 DIAGNOSIS — Z90.710 ACQUIRED ABSENCE OF BOTH CERVIX AND UTERUS: Chronic | ICD-10-CM

## 2018-04-15 DIAGNOSIS — J44.9 CHRONIC OBSTRUCTIVE PULMONARY DISEASE, UNSPECIFIED: ICD-10-CM

## 2018-04-15 DIAGNOSIS — G93.9 DISORDER OF BRAIN, UNSPECIFIED: ICD-10-CM

## 2018-04-15 DIAGNOSIS — K83.1 OBSTRUCTION OF BILE DUCT: ICD-10-CM

## 2018-04-15 DIAGNOSIS — R78.81 BACTEREMIA: ICD-10-CM

## 2018-04-15 DIAGNOSIS — R63.8 OTHER SYMPTOMS AND SIGNS CONCERNING FOOD AND FLUID INTAKE: ICD-10-CM

## 2018-04-15 DIAGNOSIS — K85.90 ACUTE PANCREATITIS WITHOUT NECROSIS OR INFECTION, UNSPECIFIED: ICD-10-CM

## 2018-04-15 DIAGNOSIS — C56.9 MALIGNANT NEOPLASM OF UNSPECIFIED OVARY: ICD-10-CM

## 2018-04-15 DIAGNOSIS — J90 PLEURAL EFFUSION, NOT ELSEWHERE CLASSIFIED: ICD-10-CM

## 2018-04-15 LAB
ALBUMIN SERPL ELPH-MCNC: 2.4 G/DL — LOW (ref 3.3–5)
ALP SERPL-CCNC: 1587 U/L — HIGH (ref 40–120)
ALT FLD-CCNC: 52 U/L RC — HIGH (ref 10–45)
ANION GAP SERPL CALC-SCNC: 14 MMOL/L — SIGNIFICANT CHANGE UP (ref 5–17)
AST SERPL-CCNC: 58 U/L — HIGH (ref 10–40)
BASOPHILS # BLD AUTO: 0.01 K/UL — SIGNIFICANT CHANGE UP (ref 0–0.2)
BASOPHILS NFR BLD AUTO: 0.1 % — SIGNIFICANT CHANGE UP (ref 0–2)
BILIRUB SERPL-MCNC: 0.8 MG/DL — SIGNIFICANT CHANGE UP (ref 0.2–1.2)
BUN SERPL-MCNC: 29 MG/DL — HIGH (ref 7–23)
CALCIUM SERPL-MCNC: 8.9 MG/DL — SIGNIFICANT CHANGE UP (ref 8.4–10.5)
CHLORIDE SERPL-SCNC: 102 MMOL/L — SIGNIFICANT CHANGE UP (ref 96–108)
CO2 SERPL-SCNC: 24 MMOL/L — SIGNIFICANT CHANGE UP (ref 22–31)
CREAT SERPL-MCNC: 1.44 MG/DL — HIGH (ref 0.5–1.3)
E COLI DNA BLD POS QL NAA+NON-PROBE: SIGNIFICANT CHANGE UP
EOSINOPHIL # BLD AUTO: 0.05 K/UL — SIGNIFICANT CHANGE UP (ref 0–0.5)
EOSINOPHIL NFR BLD AUTO: 0.6 % — SIGNIFICANT CHANGE UP (ref 0–6)
GLUCOSE SERPL-MCNC: 184 MG/DL — HIGH (ref 70–99)
GRAM STN FLD: SIGNIFICANT CHANGE UP
HBA1C BLD-MCNC: 6.5 % — HIGH (ref 4–5.6)
HCT VFR BLD CALC: 32.7 % — LOW (ref 34.5–45)
HGB BLD-MCNC: 10.3 G/DL — LOW (ref 11.5–15.5)
IMM GRANULOCYTES NFR BLD AUTO: 0.1 % — SIGNIFICANT CHANGE UP (ref 0–1.5)
LYMPHOCYTES # BLD AUTO: 0.49 K/UL — LOW (ref 1–3.3)
LYMPHOCYTES # BLD AUTO: 5.8 % — LOW (ref 13–44)
MCHC RBC-ENTMCNC: 28.9 PG — SIGNIFICANT CHANGE UP (ref 27–34)
MCHC RBC-ENTMCNC: 31.5 GM/DL — LOW (ref 32–36)
MCV RBC AUTO: 91.9 FL — SIGNIFICANT CHANGE UP (ref 80–100)
METHOD TYPE: SIGNIFICANT CHANGE UP
MONOCYTES # BLD AUTO: 0.18 K/UL — SIGNIFICANT CHANGE UP (ref 0–0.9)
MONOCYTES NFR BLD AUTO: 2.1 % — SIGNIFICANT CHANGE UP (ref 2–14)
NEUTROPHILS # BLD AUTO: 7.7 K/UL — HIGH (ref 1.8–7.4)
NEUTROPHILS NFR BLD AUTO: 91.3 % — HIGH (ref 43–77)
PLATELET # BLD AUTO: 305 K/UL — SIGNIFICANT CHANGE UP (ref 150–400)
POTASSIUM SERPL-MCNC: 4.7 MMOL/L — SIGNIFICANT CHANGE UP (ref 3.5–5.3)
POTASSIUM SERPL-SCNC: 4.7 MMOL/L — SIGNIFICANT CHANGE UP (ref 3.5–5.3)
PROT SERPL-MCNC: 6.2 G/DL — SIGNIFICANT CHANGE UP (ref 6–8.3)
RBC # BLD: 3.56 M/UL — LOW (ref 3.8–5.2)
RBC # FLD: 15.6 % — HIGH (ref 10.3–14.5)
SODIUM SERPL-SCNC: 140 MMOL/L — SIGNIFICANT CHANGE UP (ref 135–145)
SPECIMEN SOURCE: SIGNIFICANT CHANGE UP
SPECIMEN SOURCE: SIGNIFICANT CHANGE UP
WBC # BLD: 8.44 K/UL — SIGNIFICANT CHANGE UP (ref 3.8–10.5)
WBC # FLD AUTO: 8.44 K/UL — SIGNIFICANT CHANGE UP (ref 3.8–10.5)

## 2018-04-15 PROCEDURE — 99223 1ST HOSP IP/OBS HIGH 75: CPT | Mod: GC

## 2018-04-15 PROCEDURE — 70553 MRI BRAIN STEM W/O & W/DYE: CPT | Mod: 26

## 2018-04-15 PROCEDURE — 76705 ECHO EXAM OF ABDOMEN: CPT | Mod: 26

## 2018-04-15 RX ORDER — DEXTROSE 50 % IN WATER 50 %
12.5 SYRINGE (ML) INTRAVENOUS ONCE
Qty: 0 | Refills: 0 | Status: DISCONTINUED | OUTPATIENT
Start: 2018-04-15 | End: 2018-04-20

## 2018-04-15 RX ORDER — LISINOPRIL 2.5 MG/1
40 TABLET ORAL DAILY
Qty: 0 | Refills: 0 | Status: DISCONTINUED | OUTPATIENT
Start: 2018-04-15 | End: 2018-04-20

## 2018-04-15 RX ORDER — MORPHINE SULFATE 50 MG/1
4 CAPSULE, EXTENDED RELEASE ORAL EVERY 4 HOURS
Qty: 0 | Refills: 0 | Status: DISCONTINUED | OUTPATIENT
Start: 2018-04-15 | End: 2018-04-20

## 2018-04-15 RX ORDER — SODIUM CHLORIDE 9 MG/ML
1000 INJECTION, SOLUTION INTRAVENOUS
Qty: 0 | Refills: 0 | Status: DISCONTINUED | OUTPATIENT
Start: 2018-04-15 | End: 2018-04-20

## 2018-04-15 RX ORDER — SENNA PLUS 8.6 MG/1
2 TABLET ORAL AT BEDTIME
Qty: 0 | Refills: 0 | Status: DISCONTINUED | OUTPATIENT
Start: 2018-04-15 | End: 2018-04-20

## 2018-04-15 RX ORDER — SODIUM CHLORIDE 9 MG/ML
1000 INJECTION INTRAMUSCULAR; INTRAVENOUS; SUBCUTANEOUS
Qty: 0 | Refills: 0 | Status: DISCONTINUED | OUTPATIENT
Start: 2018-04-15 | End: 2018-04-20

## 2018-04-15 RX ORDER — DOCUSATE SODIUM 100 MG
100 CAPSULE ORAL THREE TIMES A DAY
Qty: 0 | Refills: 0 | Status: DISCONTINUED | OUTPATIENT
Start: 2018-04-15 | End: 2018-04-20

## 2018-04-15 RX ORDER — IPRATROPIUM/ALBUTEROL SULFATE 18-103MCG
3 AEROSOL WITH ADAPTER (GRAM) INHALATION EVERY 6 HOURS
Qty: 0 | Refills: 0 | Status: DISCONTINUED | OUTPATIENT
Start: 2018-04-15 | End: 2018-04-20

## 2018-04-15 RX ORDER — DEXTROSE 50 % IN WATER 50 %
25 SYRINGE (ML) INTRAVENOUS ONCE
Qty: 0 | Refills: 0 | Status: DISCONTINUED | OUTPATIENT
Start: 2018-04-15 | End: 2018-04-20

## 2018-04-15 RX ORDER — ACETAMINOPHEN 500 MG
650 TABLET ORAL EVERY 6 HOURS
Qty: 0 | Refills: 0 | Status: DISCONTINUED | OUTPATIENT
Start: 2018-04-15 | End: 2018-04-20

## 2018-04-15 RX ORDER — ONDANSETRON 8 MG/1
4 TABLET, FILM COATED ORAL EVERY 6 HOURS
Qty: 0 | Refills: 0 | Status: DISCONTINUED | OUTPATIENT
Start: 2018-04-15 | End: 2018-04-20

## 2018-04-15 RX ORDER — ALPRAZOLAM 0.25 MG
0.5 TABLET ORAL ONCE
Qty: 0 | Refills: 0 | Status: DISCONTINUED | OUTPATIENT
Start: 2018-04-15 | End: 2018-04-15

## 2018-04-15 RX ORDER — FUROSEMIDE 40 MG
40 TABLET ORAL DAILY
Qty: 0 | Refills: 0 | Status: DISCONTINUED | OUTPATIENT
Start: 2018-04-15 | End: 2018-04-15

## 2018-04-15 RX ORDER — INSULIN LISPRO 100/ML
VIAL (ML) SUBCUTANEOUS
Qty: 0 | Refills: 0 | Status: DISCONTINUED | OUTPATIENT
Start: 2018-04-15 | End: 2018-04-20

## 2018-04-15 RX ORDER — ENOXAPARIN SODIUM 100 MG/ML
40 INJECTION SUBCUTANEOUS EVERY 24 HOURS
Qty: 0 | Refills: 0 | Status: DISCONTINUED | OUTPATIENT
Start: 2018-04-15 | End: 2018-04-19

## 2018-04-15 RX ORDER — INSULIN LISPRO 100/ML
VIAL (ML) SUBCUTANEOUS AT BEDTIME
Qty: 0 | Refills: 0 | Status: DISCONTINUED | OUTPATIENT
Start: 2018-04-15 | End: 2018-04-20

## 2018-04-15 RX ORDER — INFLUENZA VIRUS VACCINE 15; 15; 15; 15 UG/.5ML; UG/.5ML; UG/.5ML; UG/.5ML
0.5 SUSPENSION INTRAMUSCULAR ONCE
Qty: 0 | Refills: 0 | Status: DISCONTINUED | OUTPATIENT
Start: 2018-04-15 | End: 2018-04-20

## 2018-04-15 RX ORDER — GLUCAGON INJECTION, SOLUTION 0.5 MG/.1ML
1 INJECTION, SOLUTION SUBCUTANEOUS ONCE
Qty: 0 | Refills: 0 | Status: DISCONTINUED | OUTPATIENT
Start: 2018-04-15 | End: 2018-04-20

## 2018-04-15 RX ORDER — MORPHINE SULFATE 50 MG/1
2 CAPSULE, EXTENDED RELEASE ORAL EVERY 4 HOURS
Qty: 0 | Refills: 0 | Status: DISCONTINUED | OUTPATIENT
Start: 2018-04-15 | End: 2018-04-20

## 2018-04-15 RX ORDER — DEXTROSE 50 % IN WATER 50 %
1 SYRINGE (ML) INTRAVENOUS ONCE
Qty: 0 | Refills: 0 | Status: DISCONTINUED | OUTPATIENT
Start: 2018-04-15 | End: 2018-04-20

## 2018-04-15 RX ADMIN — PIPERACILLIN AND TAZOBACTAM 25 GRAM(S): 4; .5 INJECTION, POWDER, LYOPHILIZED, FOR SOLUTION INTRAVENOUS at 05:25

## 2018-04-15 RX ADMIN — Medication 100 MILLIGRAM(S): at 21:46

## 2018-04-15 RX ADMIN — SODIUM CHLORIDE 100 MILLILITER(S): 9 INJECTION INTRAMUSCULAR; INTRAVENOUS; SUBCUTANEOUS at 17:50

## 2018-04-15 RX ADMIN — Medication 1: at 08:59

## 2018-04-15 RX ADMIN — Medication 4 MILLIGRAM(S): at 17:53

## 2018-04-15 RX ADMIN — Medication 3 MILLILITER(S): at 05:26

## 2018-04-15 RX ADMIN — Medication 1: at 17:51

## 2018-04-15 RX ADMIN — SODIUM CHLORIDE 100 MILLILITER(S): 9 INJECTION INTRAMUSCULAR; INTRAVENOUS; SUBCUTANEOUS at 05:27

## 2018-04-15 RX ADMIN — Medication 3 MILLILITER(S): at 17:53

## 2018-04-15 RX ADMIN — ENOXAPARIN SODIUM 40 MILLIGRAM(S): 100 INJECTION SUBCUTANEOUS at 05:25

## 2018-04-15 RX ADMIN — Medication 3 MILLILITER(S): at 12:40

## 2018-04-15 RX ADMIN — PIPERACILLIN AND TAZOBACTAM 25 GRAM(S): 4; .5 INJECTION, POWDER, LYOPHILIZED, FOR SOLUTION INTRAVENOUS at 00:37

## 2018-04-15 RX ADMIN — Medication 4 MILLIGRAM(S): at 05:26

## 2018-04-15 RX ADMIN — PIPERACILLIN AND TAZOBACTAM 25 GRAM(S): 4; .5 INJECTION, POWDER, LYOPHILIZED, FOR SOLUTION INTRAVENOUS at 21:45

## 2018-04-15 RX ADMIN — PIPERACILLIN AND TAZOBACTAM 25 GRAM(S): 4; .5 INJECTION, POWDER, LYOPHILIZED, FOR SOLUTION INTRAVENOUS at 15:26

## 2018-04-15 RX ADMIN — Medication 0.5 MILLIGRAM(S): at 13:53

## 2018-04-15 RX ADMIN — LISINOPRIL 40 MILLIGRAM(S): 2.5 TABLET ORAL at 05:33

## 2018-04-15 RX ADMIN — Medication 4 MILLIGRAM(S): at 21:46

## 2018-04-15 RX ADMIN — Medication 4 MILLIGRAM(S): at 12:41

## 2018-04-15 RX ADMIN — Medication 1: at 12:40

## 2018-04-15 RX ADMIN — Medication 100 MILLIGRAM(S): at 05:26

## 2018-04-15 NOTE — PROGRESS NOTE ADULT - ATTENDING COMMENTS
I saw and evaluated the patient. The patient is a 72-year-old female with a history of metastatic ovarian cancer s/p resection and chemotherapy who presents with blurry vision, shortness of breath, and abdominal pain, found via CT to have a hyperdense left temporal lobe lesion with vasogenic edema suspicious for a metastasis, right greater than left pleural effusions, and abdominal ascites concerning for peritoneal carcinomatosis. The patient is currently neurologically stable. No acute neurosurgical intervention is indicated at this time. Recommend an MRI of the brain with and without contrast for further evaluation of the left temporal lobe lesion. Recommend steroids to decrease vasogenic edema. F/U Oncology and Radiation Oncology recommendations.

## 2018-04-15 NOTE — PATIENT PROFILE ADULT. - VISION (WITH CORRECTIVE LENSES IF THE PATIENT USUALLY WEARS THEM):
Normal vision: sees adequately in most situations; can see medication labels, newsprint/glasses for reading and for distance

## 2018-04-15 NOTE — PROGRESS NOTE ADULT - SUBJECTIVE AND OBJECTIVE BOX
GREEN TEAM SURGERY PROGRESS NOTE (pager #[phone])    SUBJECTIVE: 73yo Woman seen and examined during morning rounds. No acute events overnight. Afebrile, VS stable. Pain well controlled with current regimen.     OBJECTIVE:    Physical Exam:  Gen: Resting in bed, NAD, alert and oriented  Resp: airway patent, respirations unlabored, no increased WOB   Abd: Soft, nondistended, minimally tender in RUQ and lower abdomen. No rebound or guarding. No organomegaly, no palpable mass.    Vital Signs Last 24 Hrs  T(C): 37 (15 Apr 2018 01:38), Max: 37.7 (15 Apr 2018 01:00)  T(F): 98.6 (15 Apr 2018 01:38), Max: 99.8 (15 Apr 2018 01:00)  HR: 100 (15 Apr 2018 05:39) (81 - 112)  BP: 130/79 (15 Apr 2018 05:39) (87/59 - 130/79)  BP(mean): --  RR: 18 (15 Apr 2018 01:38) (18 - 24)  SpO2: 93% (15 Apr 2018 01:38) (92% - 97%)    I&O's Detail      MEDICATIONS  (STANDING):  ALBUTerol/ipratropium for Nebulization 3 milliLiter(s) Nebulizer every 6 hours  dexamethasone     Tablet 4 milliGRAM(s) Oral every 6 hours  dextrose 5%. 1000 milliLiter(s) (50 mL/Hr) IV Continuous <Continuous>  dextrose 50% Injectable 12.5 Gram(s) IV Push once  dextrose 50% Injectable 25 Gram(s) IV Push once  dextrose 50% Injectable 25 Gram(s) IV Push once  docusate sodium 100 milliGRAM(s) Oral three times a day  enoxaparin Injectable 40 milliGRAM(s) SubCutaneous every 24 hours  influenza   Vaccine 0.5 milliLiter(s) IntraMuscular once  insulin lispro (HumaLOG) corrective regimen sliding scale   SubCutaneous three times a day before meals  insulin lispro (HumaLOG) corrective regimen sliding scale   SubCutaneous at bedtime  lisinopril 40 milliGRAM(s) Oral daily  piperacillin/tazobactam IVPB. 3.375 Gram(s) IV Intermittent every 8 hours  sodium chloride 0.9%. 1000 milliLiter(s) (100 mL/Hr) IV Continuous <Continuous>    MEDICATIONS  (PRN):  acetaminophen   Tablet. 650 milliGRAM(s) Oral every 6 hours PRN Mild Pain (1 - 3)  dextrose Gel 1 Dose(s) Oral once PRN Blood Glucose LESS THAN 70 milliGRAM(s)/deciliter  glucagon  Injectable 1 milliGRAM(s) IntraMuscular once PRN Glucose LESS THAN 70 milligrams/deciliter  morphine  - Injectable 2 milliGRAM(s) IV Push every 4 hours PRN Moderate Pain (4 - 6)  morphine  - Injectable 4 milliGRAM(s) IV Push every 4 hours PRN Severe Pain (7 - 10)  ondansetron Injectable 4 milliGRAM(s) IV Push every 6 hours PRN Nausea  senna 2 Tablet(s) Oral at bedtime PRN Constipation      LABS:                        12.0   9.7   )-----------( 375      ( 14 Apr 2018 13:59 )             36.0       04-14    141  |  98  |  28<H>  ----------------------------<  60<L>  4.3   |  26  |  1.25    Ca    9.8      14 Apr 2018 13:59    TPro  7.3  /  Alb  2.9<L>  /  TBili  0.8  /  DBili  x   /  AST  71<H>  /  ALT  61<H>  /  AlkPhos  1780<H>  04-14          Negative 04-14-18 @ 16:55      --   04-14-18 @ 17:21   Growth in aerobic bottle: Gram Negative Rods  "Due to technical problems, Proteus sp. will Not be reported as part of  the BCID panel until further notice"  ***Blood Panel PCR results on this specimen are available  approximately 3 hours after the Gram stain result.***  Gram stain, PCR, and/or culture results may not always  correspond due to difference in methodologies.  ************************************************************  This PCR assay was performed using [employer].  The following targets are tested for: Enterococcus,  vancomycin resistant enterococci, Listeria monocytogenes,  coagulase negative staphylococci, S. aureus,  methicillin resistant S. aureus, Streptococcus agalactiae  (Group B), S. pneumoniae, S. pyogenes (Group A),  Acinetobacter baumannii, Enterobacter cloacae, E. coli,  Klebsiella oxytoca, K. pneumoniae, Proteus sp.,  Serratia marcescens, Haemophilus influenzae,  Neisseria meningitidis, Pseudomonas aeruginosa, Candida  albicans, C. glabrata, C krusei, C parapsilosis,  C. tropicalis and the KPC resistance gene.  Growth in anaerobic bottle: Gram Negative Rods

## 2018-04-15 NOTE — ED ADULT NURSE REASSESSMENT NOTE - NS ED NURSE REASSESS COMMENT FT1
Admitting NP called to evaluate patient's tachycardia.  Repeat rectal temperature taken, repeat finger stick and repeat vitals performed.  Per admitting NP, patient is ok to go upstairs.  RN on 8Monti aware of this.  IV Zosyn infusing on a pump as ordered.  Safety maintained, comfort care measures performed.

## 2018-04-15 NOTE — PROGRESS NOTE ADULT - SUBJECTIVE AND OBJECTIVE BOX
MRI reviewed, demonstrates 3.3 cm L parietal bone lesion and small temporal metastasis No neurosurgical intervention anticipated for this lesion. Would continue with steroids, and follow up outpatient with radiation oncology for planning for SRS. Please call with any questions and reconsult PRN.

## 2018-04-15 NOTE — PROGRESS NOTE ADULT - SUBJECTIVE AND OBJECTIVE BOX
72 year old female well known to me with platinum refractory metastatic ovarian cancer (Dx 2008, s/p DENNY/BSO, chemo with recurrence 2017 completed Tx w Carboplatin+ Doxil on 9/19/17), COPD, presenting with multiple complaints, including abdominal pain, shortness of breath and blurry vision. Patient was recently admitted to [facility] for abdominal pain and jaundice. 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 She had ERCP and biliary stent placed, was discharged to [facility] and then to her daughter's home 4/12.  She notes that her jaundice improved after stent placement, but her abdominal pain has persisted. Notes chronic constipation, denies diarrhea, notes that prior to stent placement she had some nausea and vomiting, but that has improved since. She also notes generalized fatigue, poor appetite, and shortness of breath. She is also complaining of blurry vision x 1 month which has worsened over the past days, seems worse in the sunlight. Denies fevers, chills.       In ED, Tmax 98.8, HR 81, BP 87//77, satting well on RA. Labs notable for WBC 9.7, Hgb 12, Plt 375, BUN 28, Cr 1.25 (previous BL 0.7), LFT notable for albumin 2.9, AST 71, ALT 61, Tbili 0.8, AP 1780, INR 1.17, UA with SG 1.024, large LE, >50 WBC. CT head, chest, abdomen/pelvis with multiple findings including peripancreatic changes with low densitry 3x2 cm lesion c/w metastatis vs. pancreatitis, distended GB with stone at the neck of the GB with pneumobilia, bilateral adrenal masses suspicious for metastatic disease, moderate ascites c/f peritoneal carcinomatosis, moderate right pleural effusion with associated compressive atelectasis. Patient was seen by neurosurgery, general surgery. Received zosyn, 2L NS, IV tylenol, duonebs, decadron 4mg q6h.    Pt is feeling better this AM. No N/V. Still w abd discomfort      PAST MEDICAL & SURGICAL HISTORY:  Malignant neoplasm of ovary, unspecified laterality  S/P DENNY-BSO      ROS:  Negative except for: As  above    MEDICATIONS  (STANDING):  ALBUTerol/ipratropium for Nebulization 3 milliLiter(s) Nebulizer every 6 hours  dexamethasone     Tablet 4 milliGRAM(s) Oral every 6 hours  dextrose 5%. 1000 milliLiter(s) (50 mL/Hr) IV Continuous <Continuous>  dextrose 50% Injectable 12.5 Gram(s) IV Push once  dextrose 50% Injectable 25 Gram(s) IV Push once  dextrose 50% Injectable 25 Gram(s) IV Push once  docusate sodium 100 milliGRAM(s) Oral three times a day  enoxaparin Injectable 40 milliGRAM(s) SubCutaneous every 24 hours  influenza   Vaccine 0.5 milliLiter(s) IntraMuscular once  insulin lispro (HumaLOG) corrective regimen sliding scale   SubCutaneous three times a day before meals  insulin lispro (HumaLOG) corrective regimen sliding scale   SubCutaneous at bedtime  lisinopril 40 milliGRAM(s) Oral daily  piperacillin/tazobactam IVPB. 3.375 Gram(s) IV Intermittent every 8 hours  sodium chloride 0.9%. 1000 milliLiter(s) (100 mL/Hr) IV Continuous <Continuous>    MEDICATIONS  (PRN):  acetaminophen   Tablet. 650 milliGRAM(s) Oral every 6 hours PRN Mild Pain (1 - 3)  dextrose Gel 1 Dose(s) Oral once PRN Blood Glucose LESS THAN 70 milliGRAM(s)/deciliter  glucagon  Injectable 1 milliGRAM(s) IntraMuscular once PRN Glucose LESS THAN 70 milligrams/deciliter  morphine  - Injectable 2 milliGRAM(s) IV Push every 4 hours PRN Moderate Pain (4 - 6)  morphine  - Injectable 4 milliGRAM(s) IV Push every 4 hours PRN Severe Pain (7 - 10)  ondansetron Injectable 4 milliGRAM(s) IV Push every 6 hours PRN Nausea  senna 2 Tablet(s) Oral at bedtime PRN Constipation      Allergies    No Known Allergies    Intolerances        Vital Signs Last 24 Hrs  T(C): 37.1 (15 Apr 2018 09:20), Max: 37.7 (15 Apr 2018 01:00)  T(F): 98.8 (15 Apr 2018 09:20), Max: 99.8 (15 Apr 2018 01:00)  HR: 100 (15 Apr 2018 09:20) (81 - 112)  BP: 113/72 (15 Apr 2018 09:20) (87/59 - 130/79)  BP(mean): --  RR: 18 (15 Apr 2018 09:20) (18 - 24)  SpO2: 91% (15 Apr 2018 09:20) (91% - 97%)    PHYSICAL EXAM  General: adult in NAD  HEENT: clear oropharynx, anicteric sclera, pink conjunctiva  Neck: supple  CV: RRR S1,S2+   Lungs: positive air movement b/l . Diminished BS at bases, R>L  Abdomen: softly distended, mild diffuse tenderness  Ext: no clubbing cyanosis or edema  Skin: no rashes and no petechiae  Neuro: alert and oriented X 4, no focal deficits  LABS:                          10.3   8.44  )-----------( 305      ( 15 Apr 2018 08:12 )             32.7     Serial CBC's  04-15 @ 08:12  Hct-32.7 / Hgb-10.3 / Plat-305 / RBC-3.56 / WBC-8.44          Serial CBC's  04-14 @ 13:59  Hct-36.0 / Hgb-12.0 / Plat-375 / RBC-3.84 / WBC-9.7            04-15    140  |  102  |  29<H>  ----------------------------<  184<H>  4.7   |  24  |  1.44<H>    Ca    8.9      15 Apr 2018 07:11    TPro  6.2  /  Alb  2.4<L>  /  TBili  0.8  /  DBili  x   /  AST  58<H>  /  ALT  52<H>  /  AlkPhos  1587<H>  04-15      PT/INR - ( 14 Apr 2018 13:59 )   PT: 12.8 sec;   INR: 1.17 ratio         PTT - ( 14 Apr 2018 13:59 )  PTT:28.2 sec    WBC Count: 8.44 K/uL (04-15 @ 08:12)  Hemoglobin: 10.3 g/dL (04-15 @ 08:12)            RADIOLOGY & ADDITIONAL STUDIES:

## 2018-04-15 NOTE — PROGRESS NOTE ADULT - ATTENDING COMMENTS
I saw and examined the pt and discussed the tx plan with the House Staff. I agree with the exam and plan as documented in the above note with comments below and editions as indicated.  The pt is not a surgical candidate.  It appears reasonable that a perc lexi tube would improve the pt's symptoms (given large stone at the neck), however it may cause issues with leaking ascites and superinfection of the ascites. In addition, it is possible that the pt's pain is d/t peritoneal carcinomatosis. Would get IR input in am.   Wendy Keyes MD I saw and examined the pt and discussed the tx plan with the House Staff. I agree with the exam and plan as documented in the above note with comments below and editions as indicated.  The pt is not a surgical candidate.  It appears reasonable that a perc lexi tube should address the gallbladder issue, however it may cause problems with leaking ascites and superinfection of the ascites. Of note, the pt is mainly c/o lower abd pain and bloating, which is likely d/t her ascites and peritoneal carcinomatosis. Blood cx with GNR. Given overall not straightforward picture, I would obtain a HIDA. Would also get IR input in am. It is possible that paracentesis may help with her symptoms as well.   Wendy Keyes MD

## 2018-04-15 NOTE — PROGRESS NOTE ADULT - ASSESSMENT
1. Gram Negative Bacteremia/Sepsis    -- cont IV Zosyn  -- ? Source: Gall Bladder ,   -- Follow up final Bl Cx spp  -- Consider ID eval    2. Cholecystitis, recent biliary stent    -- Surgery input appreciated  -- would recommend Percut Cholecystostomy as this is the second time GB pain flare up in last 1 mos with sig rise in Alk Phos    3. 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 No Sx    -- f/u MRI Brain with and w/o Ra  -- cont Dexa 4mg PO Q 6hrs. GI Prophylaxis w PPI  -- NeuroSx follow up  -- Radiation Oncology Consult in AM    4. 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 new brain met. Last Chemo was Carboplatin + Doxil on 9/19/17. Refused Maintenance Olaparib. Recurrence noted on CT on 3/6/18.    -- pt previously declined further chemo but having second thoughts. If she recovers from acute illness would be candidate for palliative chemo with Gemcitabine +/- Bevacizumab  -- recommend IR for Paracentesis w fluid sent for cytology    5. GO    -- I had long discussion with daughter  at bedside re GOC, Code Status. At this time pt is full code. They will cont to discuss. They have good insight as to severity of her illness and incurable nature of her ovarian cancer. We will cont to discuss    Jose Fonseca MD  cell # [phone]

## 2018-04-15 NOTE — PROGRESS NOTE ADULT - SUBJECTIVE AND OBJECTIVE BOX
HPI:  This is a 72 year old female with metastatic ovarian cancer (Dx 2008, s/p DENNY/BSO, chemo with recurrence 2017 and repeat chemo, last 2017), COPD, presenting with multiple complaints, including abdominal pain, shortness of breath and blurry vision. Patient was recently admitted to [facility] for abdominal pain and jaundice. She was found at the time to be cholestatic, had biliary stent placed, was discharged to [facility] and then to her daughter's home .  She notes that her jaundice improved after stent placement, but her abdominal pain has persisted. She describes the abdominal pain as intermittent 7/10 diffuse pain, worse in the supraumbilical/L flank regions, worse with deep breath and cough, non-radiating, not related to food.  Notes chronic constipation, denies diarrhea, notes that prior to stent placement she had some nausea and vomiting, but that has improved since. She also notes generalized fatigue, poor appetite, and shortness of breath. She is also complaining of blurry vision x 1 month which has worsened over the past days, seems worse in the sunlight. Denies fevers, chills.  She has since admission been found to have positive blood cultures for Gram negative bacteremia. She denies fever or chills. still with some abdominal pain, but not worsening.     Regarding cancer history, patient was diagnosed with ovarian cancer , underwent 4 rounds of chemo and DENNY/BSO. She has recurrence of her cancer 2017 and underwent 6 rounds of chemo, last October. Follows with Dr Lassiter.    In ED, Tmax 98.8, HR 81, BP 87//77, satting well on RA. Labs notable for WBC 9.7, Hgb 12, Plt 375, BUN 28, Cr 1.25 (previous BL 0.7), LFT notable for albumin 2.9, AST 71, ALT 61, Tbili 0.8, AP 1780, INR 1.17, UA with SG 1.024, large LE, >50 WBC. CT head, chest, abdomen/pelvis with multiple findings including peripancreatic changes with low densitry 3x2 cm lesion c/w metastatis vs. pancreatitis, distended GB with stone at the neck of the GB with pneumobilia, bilateral adrenal masses suspicious for metastatic disease, moderate ascites c/f peritoneal carcinomatosis, moderate right pleural effusion with associated compressive atelectasis. Patient was seen by neurosurgery, general surgery. Received zosyn, 2L NS, IV tylenol, duonebs, decadron 4mg q6h. (2018 23:00)      PAST MEDICAL & SURGICAL HISTORY:  Malignant neoplasm of ovary, unspecified laterality  S/P DENNY-BSO    MEDICATIONS  (STANDING):  ALBUTerol/ipratropium for Nebulization 3 milliLiter(s) Nebulizer every 6 hours  ALPRAZolam 0.5 milliGRAM(s) Oral once  dexamethasone     Tablet 4 milliGRAM(s) Oral every 6 hours  dextrose 5%. 1000 milliLiter(s) (50 mL/Hr) IV Continuous <Continuous>  dextrose 50% Injectable 12.5 Gram(s) IV Push once  dextrose 50% Injectable 25 Gram(s) IV Push once  dextrose 50% Injectable 25 Gram(s) IV Push once  docusate sodium 100 milliGRAM(s) Oral three times a day  enoxaparin Injectable 40 milliGRAM(s) SubCutaneous every 24 hours  influenza   Vaccine 0.5 milliLiter(s) IntraMuscular once  insulin lispro (HumaLOG) corrective regimen sliding scale   SubCutaneous three times a day before meals  insulin lispro (HumaLOG) corrective regimen sliding scale   SubCutaneous at bedtime  lisinopril 40 milliGRAM(s) Oral daily  piperacillin/tazobactam IVPB. 3.375 Gram(s) IV Intermittent every 8 hours  sodium chloride 0.9%. 1000 milliLiter(s) (100 mL/Hr) IV Continuous <Continuous>    MEDICATIONS  (PRN):  acetaminophen   Tablet. 650 milliGRAM(s) Oral every 6 hours PRN Mild Pain (1 - 3)  dextrose Gel 1 Dose(s) Oral once PRN Blood Glucose LESS THAN 70 milliGRAM(s)/deciliter  glucagon  Injectable 1 milliGRAM(s) IntraMuscular once PRN Glucose LESS THAN 70 milligrams/deciliter  morphine  - Injectable 2 milliGRAM(s) IV Push every 4 hours PRN Moderate Pain (4 - 6)  morphine  - Injectable 4 milliGRAM(s) IV Push every 4 hours PRN Severe Pain (7 - 10)  ondansetron Injectable 4 milliGRAM(s) IV Push every 6 hours PRN Nausea  senna 2 Tablet(s) Oral at bedtime PRN Constipation      Allergies    No Known Allergies    Intolerances        SOCIAL HISTORY: no smoker , no ETOH , No drug use     FAMILY HISTORY:      Vital Signs Last 24 Hrs  T(C): 37.1 (15 Apr 2018 09:20), Max: 37.7 (15 Apr 2018 01:00)  T(F): 98.8 (15 Apr 2018 09:20), Max: 99.8 (15 Apr 2018 01:00)  HR: 100 (15 Apr 2018 09:20) (81 - 112)  BP: 113/72 (15 Apr 2018 09:20) (99/64 - 130/79)  BP(mean): --  RR: 18 (15 Apr 2018 09:20) (18 - 22)  SpO2: 91% (15 Apr 2018 09:20) (91% - 97%)    PHYSICAL EXAM:    GENERAL: NAD, well-groomed, well-developed  HEAD:  Atraumatic, Normocephalic  EYES: EOMI, PERRLA, conjunctiva and sclera slightly icteric  ENMT:  Moist mucous membranes  NECK: Supple, No JVD  NERVOUS SYSTEM:  Alert & Oriented X3, Good concentration; Motor Strength 5/5 B/L upper and lower extremities; DTRs 2+ intact and symmetric  CHEST/LUNG: rhonchi noted, but no wheezing or crackles noted.   HEART: Regular rate and rhythm; No murmurs, rubs, or gallops  ABDOMEN: firm, minimally distended, mild discomfort to palpation, but no rebound or guarding.; Bowel sounds present  EXTREMITIES:  2+ Peripheral Pulses, No clubbing, cyanosis, or edema  SKIN: No rashes or lesions      LABS:                        10.3   8.44  )-----------( 305      ( 15 Apr 2018 08:12 )             32.7     04-15    140  |  102  |  29<H>  ----------------------------<  184<H>  4.7   |  24  |  1.44<H>    Ca    8.9      15 Apr 2018 07:11    TPro  6.2  /  Alb  2.4<L>  /  TBili  0.8  /  DBili  x   /  AST  58<H>  /  ALT  52<H>  /  AlkPhos  1587<H>  15    PT/INR - ( 2018 13:59 )   PT: 12.8 sec;   INR: 1.17 ratio         PTT - ( 2018 13:59 )  PTT:28.2 sec  Urinalysis Basic - ( 2018 16:55 )    Color: Yellow / Appearance: SL Turbid / S.024 / pH: x  Gluc: x / Ketone: Negative  / Bili: Negative / Urobili: Negative   Blood: x / Protein: 100 mg/dL / Nitrite: Negative   Leuk Esterase: Large / RBC: 3-5 /HPF / WBC >50 /HPF   Sq Epi: x / Non Sq Epi: OCC /HPF / Bacteria: Few /HPF        RADIOLOGY reviewed in chart.

## 2018-04-15 NOTE — PROGRESS NOTE ADULT - ASSESSMENT
72F with metastatic ovarian ca with biliary colic s/p CBD stent    Plan:  -medicine consult  -GI consult given biliary stent/recent procedure and abdominal pain began after stent placement  -NPO/IVF  -antibiotics  -abdominal exams  -Given advanced/metastatic disease, patient is a poor surgical candidate. If patient's pain/abdominal exam does not improve on antibiotics, will need IR percutaneous cholecystostomy.  -will continue to follow 72F with metastatic ovarian ca with biliary colic s/p CBD stent    Plan:  -medicine consult  -GI consult given biliary stent  -NPO/IVF  -antibiotics  -abdominal exams  -Given advanced/metastatic disease, patient is a poor surgical candidate. If patient's pain/abdominal exam does not improve on antibiotics, will need IR percutaneous cholecystostomy.  -will continue to follow

## 2018-04-15 NOTE — CONSULT NOTE ADULT - ASSESSMENT
72F with metastatic ovarian cancer s/p DENNY/BSO last chemotherapy in 12/2017, was admitted recently to an outside hospital for biliary obstructive s/p stent placement. Now admitted 4/14/18 with E. coli bacteremia, probably biliary source but UA positive 72F with metastatic ovarian cancer s/p DENNY/BSO last chemotherapy 12/2017, was admitted recently to [facility] for biliary obstructive s/p stent placement. Now admitted 4/14/18 with E. coli bacteremia, probably biliary source but recently treated UTI, UA still positive and some residual dysuria. Nontoxic, afebrile, no allergies.     Recommend  -continue Zosyn   -f/u susceptibilities   -f/u urine cultures   -consider GI eval for ERCP and stent exchange     Discussed with Dr Montano and primary team

## 2018-04-15 NOTE — CONSULT NOTE ADULT - SUBJECTIVE AND OBJECTIVE BOX
HPI:  72F with metastatic ovarian cancer diagnosed , s/p DENNY/BSO and chemotherapy with recurrence in 2017 and repeat chemo, last 2017, COPD,   presenting with multiple complaints, including abdominal pain, shortness of breath and blurry vision.   recently admitted to [facility] for abdominal pain and jaundice. She was found at the time to be cholestatic, had biliary stent placed, was discharged to [facility] and then to her daughter's home .  She notes that her jaundice improved after stent placement, but her abdominal pain has persisted. She describes the abdominal pain as intermittent 7/10 diffuse pain, worse in the supraumbilical/L flank regions, worse with deep breath and cough, non-radiating, not related to food.  Notes chronic constipation, denies diarrhea, notes that prior to stent placement she had some nausea and vomiting, but that has improved since. She also notes generalized fatigue, poor appetite, and shortness of breath. She is also complaining of blurry vision x 1 month which has worsened over the past days, seems worse in the sunlight. Denies fevers, chills.      PAST MEDICAL & SURGICAL HISTORY:  Malignant neoplasm of ovary, unspecified laterality  S/P DENNY-BSO      Allergies    No Known Allergies    Intolerances        ANTIMICROBIALS:  piperacillin/tazobactam IVPB. 3.375 every 8 hours      OTHER MEDS:  acetaminophen   Tablet. 650 milliGRAM(s) Oral every 6 hours PRN  ALBUTerol/ipratropium for Nebulization 3 milliLiter(s) Nebulizer every 6 hours  dexamethasone     Tablet 4 milliGRAM(s) Oral every 6 hours  dextrose 5%. 1000 milliLiter(s) IV Continuous <Continuous>  dextrose 50% Injectable 12.5 Gram(s) IV Push once  dextrose 50% Injectable 25 Gram(s) IV Push once  dextrose 50% Injectable 25 Gram(s) IV Push once  dextrose Gel 1 Dose(s) Oral once PRN  docusate sodium 100 milliGRAM(s) Oral three times a day  enoxaparin Injectable 40 milliGRAM(s) SubCutaneous every 24 hours  glucagon  Injectable 1 milliGRAM(s) IntraMuscular once PRN  influenza   Vaccine 0.5 milliLiter(s) IntraMuscular once  insulin lispro (HumaLOG) corrective regimen sliding scale   SubCutaneous three times a day before meals  insulin lispro (HumaLOG) corrective regimen sliding scale   SubCutaneous at bedtime  lisinopril 40 milliGRAM(s) Oral daily  morphine  - Injectable 2 milliGRAM(s) IV Push every 4 hours PRN  morphine  - Injectable 4 milliGRAM(s) IV Push every 4 hours PRN  ondansetron Injectable 4 milliGRAM(s) IV Push every 6 hours PRN  senna 2 Tablet(s) Oral at bedtime PRN  sodium chloride 0.9%. 1000 milliLiter(s) IV Continuous <Continuous>      SOCIAL HISTORY:    Marital Status:  (   )    (  ) Single    (   )    (  )   Occupation:   Lives with: (  ) alone  (  ) children   (  ) spouse   (  ) parents  (  ) other    Substance Use (street drugs): (  ) never used  (  ) other:  Tobacco Usage:  (  ) never smoked   (   ) former smoker   (   ) current smoker  (     ) pack year  (        ) last cigarette date  Alcohol Usage: Social EtOH    FAMILY HISTORY:      ROS:  Unobtainable because:   All other systems negative     Constitutional: no fever, no chills, no weight loss, no night sweats  HEENT:  no vision changes, no sore throat, no rhinorrhea  Cardiovascular:  no chest pain, no palpitation  Respiratory:  no SOB, no cough  GI:  no abd pain, no vomiting, no diarrhea  urinary: no dysuria, no hematuria, no flank pain  musculoskeletal:  no joint pain, no joint swelling  skin:  no rash  neurology:  no headache, no seizure, no change in mental status    Physical Exam:    General:    NAD, non toxic, A&O x3  HEENT:   no oropharyngeal lesions, no LAD, neck supple  Cardiovascular:    regular S1,S2, no murmur  Respiratory:   clear b/l, no wheezing  abd:   soft, BS +, not tender, no hepatosplenomegaly  :     no CVAT, no spurapubic tenderness, no carter  Musculoskeletal : no joint swelling, no edema  Skin:    no rash  Neurologic:     no focal deficits      Drug Dosing Weight  Height (cm): 157.48 (15 Apr 2018 01:38)  Weight (kg): 89.6 (15 Apr 2018 01:38)  BMI (kg/m2): 36.1 (15 Apr 2018 01:38)  BSA (m2): 1.9 (15 Apr 2018 01:38)    Vital Signs Last 24 Hrs  T(F): 98.8 (04-15-18 @ 09:20), Max: 99.8 (04-15-18 @ 01:00)    Vital Signs Last 24 Hrs  HR: 100 (04-15-18 @ 09:20) (81 - 112)  BP: 113/72 (04-15-18 @ 09:20) (99/64 - 130/79)  RR: 18 (04-15-18 @ 09:20)  SpO2: 91% (04-15-18 @ 09:20) (91% - 97%)  Wt(kg): --                          10.3   8.44  )-----------( 305      ( 15 Apr 2018 08:12 )             32.7       04-15    140  |  102  |  29<H>  ----------------------------<  184<H>  4.7   |  24  |  1.44<H>    Ca    8.9      15 Apr 2018 07:11    TPro  6.2  /  Alb  2.4<L>  /  TBili  0.8  /  DBili  x   /  AST  58<H>  /  ALT  52<H>  /  AlkPhos  1587<H>  04-15      Urinalysis Basic - ( 2018 16:55 )    Color: Yellow / Appearance: SL Turbid / S.024 / pH: x  Gluc: x / Ketone: Negative  / Bili: Negative / Urobili: Negative   Blood: x / Protein: 100 mg/dL / Nitrite: Negative   Leuk Esterase: Large / RBC: 3-5 /HPF / WBC >50 /HPF   Sq Epi: x / Non Sq Epi: OCC /HPF / Bacteria: Few /HPF        MICROBIOLOGY:  Culture - Blood (18 @ 17:21)  Growth in anaerobic bottle: Gram Negative Rods  Growth in anaerobic bottle: Gram Negative Rods    Culture - Blood (18 @ 17:21)  -  Escherichia coli: Detec  Gram Stain:     Growth in aerobic bottle: Gram Negative Rods     Growth in anaerobic bottle: Gram Negative Rods    RADIOLOGY:  CT Chest Abdomen and Pelvis w/ IV Cont (18 @ 16:47)   No bowel obstruction.  Peripancreatic inflammatory change with 3.1 x 2.0 cm low-density lesion   along the anterior body concerning for serosal metastasis. Acute   pancreatitis could have a similar appearance. Correlate with physical   exam and lipase level.  Distended gallbladder with numerous stones, one of which appears to be in   the neck of the gallbladder. Thickening of the gallbladder wall.   Ultrasound or DISIDA scan may be obtained to assess for acute cholecystitis.  Pneumobilia, consistent with the presence of a biliary stent. Additional   peripheral linear foci of gas in the left lobe of the liver may also be   related to pneumobilia versus, somewhat less likely, portal venous gas.   Bilateral adrenal masses, suspicious for metastatic disease.  Moderate ascites. Concern for peritoneal carcinomatosis.  Moderate right pleural effusion with associated compressive atelectasis.    US TTE 2D F/U, Limited w/o Contrast (ED) (18 @ 13:58)   No significant Pericardial Effusion. HPI:  72F with metastatic ovarian cancer diagnosed , s/p DENNY/BSO and chemotherapy with recurrence in 2017 and repeat chemo, last 2017, COPD,   presenting with multiple complaints, including abdominal pain, shortness of breath and blurry vision.   recently admitted to [facility] for abdominal pain and jaundice. She was found at the time to be cholestatic, had biliary stent placed, was discharged to [facility] and then to her daughter's home .  She notes that her jaundice improved after stent placement, but her abdominal pain has persisted. She describes the abdominal pain as intermittent 7/10 diffuse pain, worse in the supraumbilical/L flank regions, worse with deep breath and cough, non-radiating, not related to food.  Notes chronic constipation, denies diarrhea, notes that prior to stent placement she had some nausea and vomiting, but that has improved since. She also notes generalized fatigue, poor appetite, and shortness of breath. She is also complaining of blurry vision x 1 month which has worsened over the past days, seems worse in the sunlight. Denies fevers, chills.      PAST MEDICAL & SURGICAL HISTORY:  Malignant neoplasm of ovary, unspecified laterality  S/P DENNY-BSO      Allergies  No Known Allergies    ANTIMICROBIALS:  piperacillin/tazobactam IVPB. 3.375 every 8 hours    OTHER MEDS:  acetaminophen   Tablet. 650 milliGRAM(s) Oral every 6 hours PRN  ALBUTerol/ipratropium for Nebulization 3 milliLiter(s) Nebulizer every 6 hours  dexamethasone     Tablet 4 milliGRAM(s) Oral every 6 hours  dextrose 5%. 1000 milliLiter(s) IV Continuous <Continuous>  dextrose 50% Injectable 12.5 Gram(s) IV Push once  dextrose 50% Injectable 25 Gram(s) IV Push once  dextrose 50% Injectable 25 Gram(s) IV Push once  dextrose Gel 1 Dose(s) Oral once PRN  docusate sodium 100 milliGRAM(s) Oral three times a day  enoxaparin Injectable 40 milliGRAM(s) SubCutaneous every 24 hours  glucagon  Injectable 1 milliGRAM(s) IntraMuscular once PRN  influenza   Vaccine 0.5 milliLiter(s) IntraMuscular once  insulin lispro (HumaLOG) corrective regimen sliding scale   SubCutaneous three times a day before meals  insulin lispro (HumaLOG) corrective regimen sliding scale   SubCutaneous at bedtime  lisinopril 40 milliGRAM(s) Oral daily  morphine  - Injectable 2 milliGRAM(s) IV Push every 4 hours PRN  morphine  - Injectable 4 milliGRAM(s) IV Push every 4 hours PRN  ondansetron Injectable 4 milliGRAM(s) IV Push every 6 hours PRN  senna 2 Tablet(s) Oral at bedtime PRN  sodium chloride 0.9%. 1000 milliLiter(s) IV Continuous <Continuous>      SOCIAL HISTORY:    Marital Status:  (   )    (  ) Single    (   )    (  )   Occupation:   Lives with: (  ) alone  (  ) children   (  ) spouse   (  ) parents  (  ) other    Substance Use (street drugs): (  ) never used  (  ) other:  Tobacco Usage:  (  ) never smoked   (   ) former smoker   (   ) current smoker  (     ) pack year  (        ) last cigarette date  Alcohol Usage: Social EtOH    FAMILY HISTORY:      ROS:  Unobtainable because:   All other systems negative     Constitutional: no fever, no chills, no weight loss, no night sweats  HEENT:  no vision changes, no sore throat, no rhinorrhea  Cardiovascular:  no chest pain, no palpitation  Respiratory:  no SOB, no cough  GI:  no abd pain, no vomiting, no diarrhea  urinary: no dysuria, no hematuria, no flank pain  musculoskeletal:  no joint pain, no joint swelling  skin:  no rash  neurology:  no headache, no seizure, no change in mental status    Physical Exam:    General:    NAD, non toxic, A&O x3  HEENT:   no oropharyngeal lesions, no LAD, neck supple  Cardiovascular:    regular S1,S2, no murmur  Respiratory:   clear b/l, no wheezing  abd:   soft, BS +, not tender, no hepatosplenomegaly  :     no CVAT, no spurapubic tenderness, no carter  Musculoskeletal : no joint swelling, no edema  Skin:    no rash  Neurologic:     no focal deficits      Drug Dosing Weight  Height (cm): 157.48 (15 Apr 2018 01:38)  Weight (kg): 89.6 (15 Apr 2018 01:38)  BMI (kg/m2): 36.1 (15 Apr 2018 01:38)  BSA (m2): 1.9 (15 Apr 2018 01:38)    Vital Signs Last 24 Hrs  T(F): 98.8 (04-15-18 @ 09:20), Max: 99.8 (04-15-18 @ 01:00)    Vital Signs Last 24 Hrs  HR: 100 (04-15-18 @ 09:20) (81 - 112)  BP: 113/72 (04-15-18 @ 09:20) (99/64 - 130/79)  RR: 18 (04-15-18 @ 09:20)  SpO2: 91% (04-15-18 @ 09:20) (91% - 97%)  Wt(kg): --                          10.3   8.44  )-----------( 305      ( 15 Apr 2018 08:12 )             32.7       04-15    140  |  102  |  29<H>  ----------------------------<  184<H>  4.7   |  24  |  1.44<H>    Ca    8.9      15 Apr 2018 07:11    TPro  6.2  /  Alb  2.4<L>  /  TBili  0.8  /  DBili  x   /  AST  58<H>  /  ALT  52<H>  /  AlkPhos  1587<H>  04-15      Urinalysis Basic - ( 2018 16:55 )    Color: Yellow / Appearance: SL Turbid / S.024 / pH: x  Gluc: x / Ketone: Negative  / Bili: Negative / Urobili: Negative   Blood: x / Protein: 100 mg/dL / Nitrite: Negative   Leuk Esterase: Large / RBC: 3-5 /HPF / WBC >50 /HPF   Sq Epi: x / Non Sq Epi: OCC /HPF / Bacteria: Few /HPF        MICROBIOLOGY:  Culture - Blood (18 @ 17:21)  Growth in anaerobic bottle: Gram Negative Rods  Growth in anaerobic bottle: Gram Negative Rods    Culture - Blood (18 @ 17:21)  -  Escherichia coli: Detec  Gram Stain:     Growth in aerobic bottle: Gram Negative Rods     Growth in anaerobic bottle: Gram Negative Rods    RADIOLOGY:  CT Chest Abdomen and Pelvis w/ IV Cont (18 @ 16:47)   No bowel obstruction.  Peripancreatic inflammatory change with 3.1 x 2.0 cm low-density lesion   along the anterior body concerning for serosal metastasis. Acute   pancreatitis could have a similar appearance. Correlate with physical   exam and lipase level.  Distended gallbladder with numerous stones, one of which appears to be in   the neck of the gallbladder. Thickening of the gallbladder wall.   Ultrasound or DISIDA scan may be obtained to assess for acute cholecystitis.  Pneumobilia, consistent with the presence of a biliary stent. Additional   peripheral linear foci of gas in the left lobe of the liver may also be   related to pneumobilia versus, somewhat less likely, portal venous gas.   Bilateral adrenal masses, suspicious for metastatic disease.  Moderate ascites. Concern for peritoneal carcinomatosis.  Moderate right pleural effusion with associated compressive atelectasis.    US TTE 2D F/U, Limited w/o Contrast (ED) (18 @ 13:58)   No significant Pericardial Effusion. HPI:  72F with metastatic ovarian cancer diagnosed , s/p DENNY/BSO and chemotherapy with recurrence in , resumed chemotherapy in 2017, COPD. Recently hospitalized at [facility] for two weeks for biliary obstruction, had a stent placed and was discharged to rehab where she was for another two weeks, went to her daughter's home . Shortly after discharge from rehab she felt "unwell" and something wasn't right. Here afebrile but blood cultures growing E. coli. Reports receiving different antibiotics at [facility], was told she had a UTI among other things. Her dysuria improved but she still has some. Urine is not cloudy, bloody or foul smelling but is very concentrated. Abdominal discomfort all over but mostly to her lower abdomen, not necessarily pain. No diarrhea or vomiting.     PAST MEDICAL & SURGICAL HISTORY:  Malignant neoplasm of ovary, unspecified laterality  S/P DENNY-BSO    Allergies  No Known Allergies    ANTIMICROBIALS:  piperacillin/tazobactam IVPB. 3.375 every 8 hours    OTHER MEDS:  acetaminophen   Tablet. 650 milliGRAM(s) Oral every 6 hours PRN  ALBUTerol/ipratropium for Nebulization 3 milliLiter(s) Nebulizer every 6 hours  dexamethasone     Tablet 4 milliGRAM(s) Oral every 6 hours  dextrose 5%. 1000 milliLiter(s) IV Continuous <Continuous>  dextrose 50% Injectable 12.5 Gram(s) IV Push once  dextrose 50% Injectable 25 Gram(s) IV Push once  dextrose 50% Injectable 25 Gram(s) IV Push once  dextrose Gel 1 Dose(s) Oral once PRN  docusate sodium 100 milliGRAM(s) Oral three times a day  enoxaparin Injectable 40 milliGRAM(s) SubCutaneous every 24 hours  glucagon  Injectable 1 milliGRAM(s) IntraMuscular once PRN  influenza   Vaccine 0.5 milliLiter(s) IntraMuscular once  insulin lispro (HumaLOG) corrective regimen sliding scale   SubCutaneous three times a day before meals  insulin lispro (HumaLOG) corrective regimen sliding scale   SubCutaneous at bedtime  lisinopril 40 milliGRAM(s) Oral daily  morphine  - Injectable 2 milliGRAM(s) IV Push every 4 hours PRN  morphine  - Injectable 4 milliGRAM(s) IV Push every 4 hours PRN  ondansetron Injectable 4 milliGRAM(s) IV Push every 6 hours PRN  senna 2 Tablet(s) Oral at bedtime PRN  sodium chloride 0.9%. 1000 milliLiter(s) IV Continuous <Continuous>      SOCIAL HISTORY: Born in [place]. Quit smoking a long time ago.     FAMILY HISTORY:  Noncontributory     ROS:  All other systems negative   Constitutional: no fever, no chills. malaise, feels unwell and off   HEENT:  no sore throat, no rhinorrhea  Cardiovascular:  no chest pain, no palpitation  Respiratory:  no SOB, no cough  GI:  as per HPI   urinary: as per HPI   musculoskeletal:  no joint pain   skin:  no rash  neurology:  no headache   heme: no bleeding     Physical Exam:  General: NAD, non toxic, A&O   HEENT:   no oropharyngeal lesions, wearing dentures, no LAD, neck supple  Cardiovascular:  regular rate and rhythm  Respiratory:   nonlabored on room air, clear b/l, no wheezing  abd: distended, ascites, bowel sounds present, soft, not tender  :  no suprapubic tenderness, no carter  Musculoskeletal : no joint swelling, no edema  Skin:    no rash. right upper chest port, nontender, no erythema  Neurologic:     no focal deficits  Psych: pleasant     Drug Dosing Weight  Height (cm): 157.48 (15 Apr 2018 01:38)  Weight (kg): 89.6 (15 Apr 2018 01:38)  BMI (kg/m2): 36.1 (15 Apr 2018 01:38)  BSA (m2): 1.9 (15 Apr 2018 01:38)    Vital Signs Last 24 Hrs  T(F): 98.8 (04-15-18 @ 09:20), Max: 99.8 (04-15-18 @ 01:00)    Vital Signs Last 24 Hrs  HR: 100 (04-15-18 @ 09:20) (81 - 112)  BP: 113/72 (04-15-18 @ 09:20) (99/64 - 130/79)  RR: 18 (04-15-18 @ 09:20)  SpO2: 91% (04-15-18 @ 09:20) (91% - 97%)  Wt(kg): --                          10.3   8.44  )-----------( 305      ( 15 Apr 2018 08:12 )             32.7       04-15    140  |  102  |  29<H>  ----------------------------<  184<H>  4.7   |  24  |  1.44<H>    Ca    8.9      15 Apr 2018 07:11    TPro  6.2  /  Alb  2.4<L>  /  TBili  0.8  /  DBili  x   /  AST  58<H>  /  ALT  52<H>  /  AlkPhos  1587<H>  04-15      Urinalysis Basic - ( 2018 16:55 )    Color: Yellow / Appearance: SL Turbid / S.024 / pH: x  Gluc: x / Ketone: Negative  / Bili: Negative / Urobili: Negative   Blood: x / Protein: 100 mg/dL / Nitrite: Negative   Leuk Esterase: Large / RBC: 3-5 /HPF / WBC >50 /HPF   Sq Epi: x / Non Sq Epi: OCC /HPF / Bacteria: Few /HPF        MICROBIOLOGY:  Culture - Blood (18 @ 17:21)  Growth in anaerobic bottle: Gram Negative Rods  Growth in anaerobic bottle: Gram Negative Rods    Culture - Blood (18 @ 17:21)  -  Escherichia coli: Detec  Gram Stain:     Growth in aerobic bottle: Gram Negative Rods     Growth in anaerobic bottle: Gram Negative Rods    RADIOLOGY:  CT Chest Abdomen and Pelvis w/ IV Cont (18 @ 16:47)   No bowel obstruction.  Peripancreatic inflammatory change with 3.1 x 2.0 cm low-density lesion   along the anterior body concerning for serosal metastasis. Acute   pancreatitis could have a similar appearance. Correlate with physical   exam and lipase level.  Distended gallbladder with numerous stones, one of which appears to be in   the neck of the gallbladder. Thickening of the gallbladder wall.   Ultrasound or DISIDA scan may be obtained to assess for acute cholecystitis.  Pneumobilia, consistent with the presence of a biliary stent. Additional   peripheral linear foci of gas in the left lobe of the liver may also be   related to pneumobilia versus, somewhat less likely, portal venous gas.   Bilateral adrenal masses, suspicious for metastatic disease.  Moderate ascites. Concern for peritoneal carcinomatosis.  Moderate right pleural effusion with associated compressive atelectasis.    US TTE 2D F/U, Limited w/o Contrast (ED) (18 @ 13:58)   No significant Pericardial Effusion.

## 2018-04-15 NOTE — PATIENT PROFILE ADULT. - PAIN SCALE PREFERRED, PROFILE
none Note written to PCP for dermatology referral.  Discussed sun protection and full skin check importance with patient.

## 2018-04-15 NOTE — CONSULT NOTE ADULT - ATTENDING COMMENTS
E coli bacteremia    suggest  Continue Zosyn  await susceptibilities  check urine culture  consider GI evaluation for consideration of ERCP and biliary stent replacement

## 2018-04-16 ENCOUNTER — RESULT REVIEW (OUTPATIENT)
Age: 73
End: 2018-04-16

## 2018-04-16 LAB
-  AMIKACIN: SIGNIFICANT CHANGE UP
-  AMOXICILLIN/CLAVULANIC ACID: SIGNIFICANT CHANGE UP
-  AMPICILLIN/SULBACTAM: SIGNIFICANT CHANGE UP
-  AMPICILLIN: SIGNIFICANT CHANGE UP
-  AZTREONAM: SIGNIFICANT CHANGE UP
-  CEFAZOLIN: SIGNIFICANT CHANGE UP
-  CEFEPIME: SIGNIFICANT CHANGE UP
-  CEFOXITIN: SIGNIFICANT CHANGE UP
-  CEFTRIAXONE: SIGNIFICANT CHANGE UP
-  CIPROFLOXACIN: SIGNIFICANT CHANGE UP
-  ERTAPENEM: SIGNIFICANT CHANGE UP
-  GENTAMICIN: SIGNIFICANT CHANGE UP
-  IMIPENEM: SIGNIFICANT CHANGE UP
-  LEVOFLOXACIN: SIGNIFICANT CHANGE UP
-  MEROPENEM: SIGNIFICANT CHANGE UP
-  NITROFURANTOIN: SIGNIFICANT CHANGE UP
-  PIPERACILLIN/TAZOBACTAM: SIGNIFICANT CHANGE UP
-  TIGECYCLINE: SIGNIFICANT CHANGE UP
-  TOBRAMYCIN: SIGNIFICANT CHANGE UP
-  TRIMETHOPRIM/SULFAMETHOXAZOLE: SIGNIFICANT CHANGE UP
ALBUMIN FLD-MCNC: 1.8 G/DL — SIGNIFICANT CHANGE UP
ANION GAP SERPL CALC-SCNC: 13 MMOL/L — SIGNIFICANT CHANGE UP (ref 5–17)
B PERT IGG+IGM PNL SER: ABNORMAL
BUN SERPL-MCNC: 27 MG/DL — HIGH (ref 7–23)
CALCIUM SERPL-MCNC: 9.1 MG/DL — SIGNIFICANT CHANGE UP (ref 8.4–10.5)
CHLORIDE SERPL-SCNC: 104 MMOL/L — SIGNIFICANT CHANGE UP (ref 96–108)
CO2 SERPL-SCNC: 24 MMOL/L — SIGNIFICANT CHANGE UP (ref 22–31)
COLOR FLD: YELLOW — SIGNIFICANT CHANGE UP
CREAT SERPL-MCNC: 1.25 MG/DL — SIGNIFICANT CHANGE UP (ref 0.5–1.3)
CULTURE RESULTS: SIGNIFICANT CHANGE UP
FLUID INTAKE SUBSTANCE CLASS: SIGNIFICANT CHANGE UP
FLUID SEGMENTED GRANULOCYTES: 8 % — SIGNIFICANT CHANGE UP
GLUCOSE FLD-MCNC: 182 MG/DL — SIGNIFICANT CHANGE UP
GLUCOSE SERPL-MCNC: 184 MG/DL — HIGH (ref 70–99)
GRAM STN FLD: SIGNIFICANT CHANGE UP
HCT VFR BLD CALC: 33.7 % — LOW (ref 34.5–45)
HGB BLD-MCNC: 10.5 G/DL — LOW (ref 11.5–15.5)
LDH SERPL L TO P-CCNC: 139 U/L — SIGNIFICANT CHANGE UP
LYMPHOCYTES # FLD: 65 % — SIGNIFICANT CHANGE UP
MCHC RBC-ENTMCNC: 29.2 PG — SIGNIFICANT CHANGE UP (ref 27–34)
MCHC RBC-ENTMCNC: 31.2 GM/DL — LOW (ref 32–36)
MCV RBC AUTO: 93.9 FL — SIGNIFICANT CHANGE UP (ref 80–100)
MESOTHL CELL # FLD: 3 % — SIGNIFICANT CHANGE UP
METHOD TYPE: SIGNIFICANT CHANGE UP
MONOS+MACROS # FLD: 20 % — SIGNIFICANT CHANGE UP
ORGANISM # SPEC MICROSCOPIC CNT: SIGNIFICANT CHANGE UP
ORGANISM # SPEC MICROSCOPIC CNT: SIGNIFICANT CHANGE UP
OTHER CELLS FLD MANUAL: 4 % — SIGNIFICANT CHANGE UP
PLATELET # BLD AUTO: 393 K/UL — SIGNIFICANT CHANGE UP (ref 150–400)
POTASSIUM SERPL-MCNC: 4.2 MMOL/L — SIGNIFICANT CHANGE UP (ref 3.5–5.3)
POTASSIUM SERPL-SCNC: 4.2 MMOL/L — SIGNIFICANT CHANGE UP (ref 3.5–5.3)
PROT FLD-MCNC: 3.5 G/DL — SIGNIFICANT CHANGE UP
RBC # BLD: 3.59 M/UL — LOW (ref 3.8–5.2)
RBC # FLD: 15.4 % — HIGH (ref 10.3–14.5)
RCV VOL RI: 620 /UL — HIGH (ref 0–5)
SODIUM SERPL-SCNC: 141 MMOL/L — SIGNIFICANT CHANGE UP (ref 135–145)
SPECIMEN SOURCE FLD: SIGNIFICANT CHANGE UP
SPECIMEN SOURCE: SIGNIFICANT CHANGE UP
SPECIMEN SOURCE: SIGNIFICANT CHANGE UP
TOTAL NUCLEATED CELL COUNT, BODY FLUID: 160 /UL — HIGH (ref 0–5)
TUBE TYPE: SIGNIFICANT CHANGE UP
WBC # BLD: 10.32 K/UL — SIGNIFICANT CHANGE UP (ref 3.8–10.5)
WBC # FLD AUTO: 10.32 K/UL — SIGNIFICANT CHANGE UP (ref 3.8–10.5)

## 2018-04-16 PROCEDURE — 78227 HEPATOBIL SYST IMAGE W/DRUG: CPT | Mod: 26

## 2018-04-16 PROCEDURE — 88108 CYTOPATH CONCENTRATE TECH: CPT | Mod: 26,59

## 2018-04-16 PROCEDURE — 99232 SBSQ HOSP IP/OBS MODERATE 35: CPT

## 2018-04-16 PROCEDURE — 88305 TISSUE EXAM BY PATHOLOGIST: CPT | Mod: 26

## 2018-04-16 PROCEDURE — 88112 CYTOPATH CELL ENHANCE TECH: CPT | Mod: 26

## 2018-04-16 PROCEDURE — 49083 ABD PARACENTESIS W/IMAGING: CPT

## 2018-04-16 PROCEDURE — 88341 IMHCHEM/IMCYTCHM EA ADD ANTB: CPT | Mod: 26,59

## 2018-04-16 PROCEDURE — 88342 IMHCHEM/IMCYTCHM 1ST ANTB: CPT | Mod: 26

## 2018-04-16 RX ADMIN — Medication 1: at 13:43

## 2018-04-16 RX ADMIN — Medication 3 MILLILITER(S): at 11:11

## 2018-04-16 RX ADMIN — Medication 4 MILLIGRAM(S): at 21:28

## 2018-04-16 RX ADMIN — PIPERACILLIN AND TAZOBACTAM 25 GRAM(S): 4; .5 INJECTION, POWDER, LYOPHILIZED, FOR SOLUTION INTRAVENOUS at 05:27

## 2018-04-16 RX ADMIN — LISINOPRIL 40 MILLIGRAM(S): 2.5 TABLET ORAL at 05:26

## 2018-04-16 RX ADMIN — ENOXAPARIN SODIUM 40 MILLIGRAM(S): 100 INJECTION SUBCUTANEOUS at 05:27

## 2018-04-16 RX ADMIN — Medication 100 MILLIGRAM(S): at 21:20

## 2018-04-16 RX ADMIN — Medication 4 MILLIGRAM(S): at 11:11

## 2018-04-16 RX ADMIN — Medication 4 MILLIGRAM(S): at 05:26

## 2018-04-16 RX ADMIN — Medication 3 MILLILITER(S): at 21:29

## 2018-04-16 RX ADMIN — ONDANSETRON 4 MILLIGRAM(S): 8 TABLET, FILM COATED ORAL at 21:20

## 2018-04-16 RX ADMIN — Medication 2: at 08:31

## 2018-04-16 RX ADMIN — Medication 3 MILLILITER(S): at 05:26

## 2018-04-16 RX ADMIN — Medication 100 MILLIGRAM(S): at 05:26

## 2018-04-16 RX ADMIN — Medication 100 MILLIGRAM(S): at 13:43

## 2018-04-16 RX ADMIN — PIPERACILLIN AND TAZOBACTAM 25 GRAM(S): 4; .5 INJECTION, POWDER, LYOPHILIZED, FOR SOLUTION INTRAVENOUS at 13:43

## 2018-04-16 RX ADMIN — Medication 3 MILLILITER(S): at 00:00

## 2018-04-16 RX ADMIN — PIPERACILLIN AND TAZOBACTAM 25 GRAM(S): 4; .5 INJECTION, POWDER, LYOPHILIZED, FOR SOLUTION INTRAVENOUS at 21:27

## 2018-04-16 NOTE — PROGRESS NOTE ADULT - ASSESSMENT
1. E. coli Bacteremia/Sepsis    -- cont IV Zosyn  -- ID input appreciated    2. Cholecystitis, recent biliary stent    -- Surgery input appreciated  -- GI Consulted, pt may need ERCP and stent replacement  -- would recommend Percut Cholecystostomy as this is the second time GB pain flare up in last 1 mos with sig rise in Alk Phos    3. Small L temporal Lobe Brain Met    -- Consult Rad Onc for consideration of SRS  -- cont Dexa 4mg PO Q 6hrs. GI Prophylaxis w PPI  -- NeuroSx follow up      4. 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 new brain met. Last Chemo was Carboplatin + Doxil on 9/19/17. Refused Maintenance Olaparib. Recurrence noted on CT on 3/6/18.    -- pt previously declined further chemo but having second thoughts. If she recovers from acute illness would be candidate for palliative chemo with Gemcitabine +/- Bevacizumab      5. GOC    -- Pt understands overall poor prognosis. Knows palliative chemo remains an option as does hospice if she does not want disease modifying tx. As of now full code. GOC discussions are on going    Jose Fonseca MD  cell # [phone]

## 2018-04-16 NOTE — PROGRESS NOTE ADULT - ASSESSMENT
This is a 72 year old female with metastatic ovarian cancer (Dx 2008, s/p DENNY/BSO, chemo with recurrence 2017 and repeat chemo, last 12/2017), COPD, presenting with abdominal pain, shortness of breath and blurry vision.  Patient with new intracranial lesion c/f metastasis, found to have gram negative bacteremia, and possible gallstone pancreatitis?

## 2018-04-16 NOTE — PROGRESS NOTE ADULT - SUBJECTIVE AND OBJECTIVE BOX
Interventional Radiology     72y Female with PMH of ovarian cancer with new onset ascites referred to IR for therapeutic and diagnostic paracentesis.      PAST MEDICAL & SURGICAL HISTORY:  Malignant neoplasm of ovary, unspecified laterality  S/P DENNY-BSO    Allergies    No Known Allergies    Labs:                        10.5   10.32 )-----------( 393      ( 16 Apr 2018 07:31 )             33.7     04-16    141  |  104  |  27<H>  ----------------------------<  184<H>  4.2   |  24  |  1.25    Ca    9.1      16 Apr 2018 07:00    TPro  6.2  /  Alb  2.4<L>  /  TBili  0.8  /  DBili  x   /  AST  58<H>  /  ALT  52<H>  /  AlkPhos  1587<H>  04-15    Activated Partial Thromboplastin Time in AM (04.14.18 @ 13:59)    Activated Partial Thromboplastin Time: 28.2  Prothrombin Time and INR, Plasma in AM (04.14.18 @ 13:59)    Prothrombin Time, Plasma: 12.8    INR: 1.17    After risks, benefits, alternatives discussion pt verbalized understanding and signed informed consent.  Will plan for tx and dx paracentesis.    LENNY Johnson  [facility] [id number]  Ext [phone]

## 2018-04-16 NOTE — CHART NOTE - NSCHARTNOTEFT_GEN_A_CORE
Reported by Radiology ---Prelim HIDA scan showed acute Cholecystitis ----results reported to Dr. Persaud-- surgery ( 2237). Dr. Pablo informed

## 2018-04-16 NOTE — PROGRESS NOTE ADULT - SUBJECTIVE AND OBJECTIVE BOX
[facility] TEAM SURGERY PROGRESS NOTE (pager #[phone])    SUBJECTIVE: 71yo Woman seen and examined during morning rounds. No acute events overnight. Afebrile, VS stable. Pain well controlled with current regimen.     OBJECTIVE:    Physical Exam:  Gen: Resting in bed, NAD, alert and oriented  Resp: airway patent, respirations unlabored, no increased WOB   Abd: Soft, nondistended, minimally tender in RUQ and lower abdomen. No rebound or guarding. No organomegaly, no palpable mass.    Vital Signs Last 24 Hrs  T(C): 37 (15 Apr 2018 01:38), Max: 37.7 (15 Apr 2018 01:00)  T(F): 98.6 (15 Apr 2018 01:38), Max: 99.8 (15 Apr 2018 01:00)  HR: 100 (15 Apr 2018 05:39) (81 - 112)  BP: 130/79 (15 Apr 2018 05:39) (87/59 - 130/79)  BP(mean): --  RR: 18 (15 Apr 2018 01:38) (18 - 24)  SpO2: 93% (15 Apr 2018 01:38) (92% - 97%)    I&O's Detail      MEDICATIONS  (STANDING):  ALBUTerol/ipratropium for Nebulization 3 milliLiter(s) Nebulizer every 6 hours  dexamethasone     Tablet 4 milliGRAM(s) Oral every 6 hours  dextrose 5%. 1000 milliLiter(s) (50 mL/Hr) IV Continuous <Continuous>  dextrose 50% Injectable 12.5 Gram(s) IV Push once  dextrose 50% Injectable 25 Gram(s) IV Push once  dextrose 50% Injectable 25 Gram(s) IV Push once  docusate sodium 100 milliGRAM(s) Oral three times a day  enoxaparin Injectable 40 milliGRAM(s) SubCutaneous every 24 hours  influenza   Vaccine 0.5 milliLiter(s) IntraMuscular once  insulin lispro (HumaLOG) corrective regimen sliding scale   SubCutaneous three times a day before meals  insulin lispro (HumaLOG) corrective regimen sliding scale   SubCutaneous at bedtime  lisinopril 40 milliGRAM(s) Oral daily  piperacillin/tazobactam IVPB. 3.375 Gram(s) IV Intermittent every 8 hours  sodium chloride 0.9%. 1000 milliLiter(s) (100 mL/Hr) IV Continuous <Continuous>    MEDICATIONS  (PRN):  acetaminophen   Tablet. 650 milliGRAM(s) Oral every 6 hours PRN Mild Pain (1 - 3)  dextrose Gel 1 Dose(s) Oral once PRN Blood Glucose LESS THAN 70 milliGRAM(s)/deciliter  glucagon  Injectable 1 milliGRAM(s) IntraMuscular once PRN Glucose LESS THAN 70 milligrams/deciliter  morphine  - Injectable 2 milliGRAM(s) IV Push every 4 hours PRN Moderate Pain (4 - 6)  morphine  - Injectable 4 milliGRAM(s) IV Push every 4 hours PRN Severe Pain (7 - 10)  ondansetron Injectable 4 milliGRAM(s) IV Push every 6 hours PRN Nausea  senna 2 Tablet(s) Oral at bedtime PRN Constipation      LABS:                        12.0   9.7   )-----------( 375      ( 14 Apr 2018 13:59 )             36.0       04-14    141  |  98  |  28<H>  ----------------------------<  60<L>  4.3   |  26  |  1.25    Ca    9.8      14 Apr 2018 13:59    TPro  7.3  /  Alb  2.9<L>  /  TBili  0.8  /  DBili  x   /  AST  71<H>  /  ALT  61<H>  /  AlkPhos  1780<H>  04-14          Negative 04-14-18 @ 16:55      --   04-14-18 @ 17:21   Growth in aerobic bottle: Gram Negative Rods  "Due to technical problems, Proteus sp. will Not be reported as part of  the BCID panel until further notice"  ***Blood Panel PCR results on this specimen are available  approximately 3 hours after the Gram stain result.***  Gram stain, PCR, and/or culture results may not always  correspond due to difference in methodologies.  ************************************************************  This PCR assay was performed using [employer].  The following targets are tested for: Enterococcus,  vancomycin resistant enterococci, Listeria monocytogenes,  coagulase negative staphylococci, S. aureus,  methicillin resistant S. aureus, Streptococcus agalactiae  (Group B), S. pneumoniae, S. pyogenes (Group A),  Acinetobacter baumannii, Enterobacter cloacae, E. coli,  Klebsiella oxytoca, K. pneumoniae, Proteus sp.,  Serratia marcescens, Haemophilus influenzae,  Neisseria meningitidis, Pseudomonas aeruginosa, Candida  albicans, C. glabrata, C krusei, C parapsilosis,  C. tropicalis and the KPC resistance gene.  Growth in anaerobic bottle: Gram Negative Rods

## 2018-04-16 NOTE — CHART NOTE - NSCHARTNOTEFT_GEN_A_CORE
Received call from Dr. Mccauley, Radiology Resident to discuss further results of HIDA scan.  Gallbladder was not visualized on HIDA, & was interpreted initially as an acute cholecystitis.  However after review of pt's records, & discussion with Radiology attending, it's noted that pt has a biliary stent in place, which may be diverting bile away from the gallbladder, hence not being visualized.  In light of above, HIDA maybe a false positive, although acute cholecystitis may not be excluded.   Dr. Persaud of surgery (bpr. #[id number]), contacted, & informed of above, no surgical intervention at this time, and will follow up with attending in AM.  Dr. Pablo made aware of above discussions.     Carli Davis, ALEJANDRINA  #[id number]

## 2018-04-16 NOTE — PROGRESS NOTE ADULT - ATTENDING COMMENTS
I saw and examined the pt and discussed the tx plan with the House Staff. I agree with the exam and plan as documented in the surgery resident's note from today.  Continues to c/o abd bloating. States she is at her wit's end and does not want to have any more tests.   Appears frustrated and depressed.  Abdomen soft, with B/L lower abd discomfort.  Blood and urine Cx with E coli.   Pt with abd pain, unclear to me if this is d/t acute cholecystitis vs peritoneal carcinomatosis/ascites. D/w Dr Fonseca, recommended HIDA (of note she did not have a HIDA during her recent admission in the OSH) to further delineate the gallbladder issue. Dr Fonseca d/w daughter who will further talk to the pt about HIDA, etc.   Will d/w IR (left message).   Wendy Keyes MD

## 2018-04-16 NOTE — PROGRESS NOTE ADULT - SUBJECTIVE AND OBJECTIVE BOX
seen and examined.  states she feels much better with less abd pain after paracentesis. no fever or chills. no SOB.  no nausea.       MEDICATIONS  (STANDING):  ALBUTerol/ipratropium for Nebulization 3 milliLiter(s) Nebulizer every 6 hours  dexamethasone     Tablet 4 milliGRAM(s) Oral every 6 hours  dextrose 5%. 1000 milliLiter(s) (50 mL/Hr) IV Continuous <Continuous>  dextrose 50% Injectable 12.5 Gram(s) IV Push once  dextrose 50% Injectable 25 Gram(s) IV Push once  dextrose 50% Injectable 25 Gram(s) IV Push once  docusate sodium 100 milliGRAM(s) Oral three times a day  enoxaparin Injectable 40 milliGRAM(s) SubCutaneous every 24 hours  influenza   Vaccine 0.5 milliLiter(s) IntraMuscular once  insulin lispro (HumaLOG) corrective regimen sliding scale   SubCutaneous three times a day before meals  insulin lispro (HumaLOG) corrective regimen sliding scale   SubCutaneous at bedtime  lisinopril 40 milliGRAM(s) Oral daily  piperacillin/tazobactam IVPB. 3.375 Gram(s) IV Intermittent every 8 hours  sodium chloride 0.9%. 1000 milliLiter(s) (100 mL/Hr) IV Continuous <Continuous>    MEDICATIONS  (PRN):  acetaminophen   Tablet. 650 milliGRAM(s) Oral every 6 hours PRN Mild Pain (1 - 3)  dextrose Gel 1 Dose(s) Oral once PRN Blood Glucose LESS THAN 70 milliGRAM(s)/deciliter  glucagon  Injectable 1 milliGRAM(s) IntraMuscular once PRN Glucose LESS THAN 70 milligrams/deciliter  morphine  - Injectable 2 milliGRAM(s) IV Push every 4 hours PRN Moderate Pain (4 - 6)  morphine  - Injectable 4 milliGRAM(s) IV Push every 4 hours PRN Severe Pain (7 - 10)  ondansetron Injectable 4 milliGRAM(s) IV Push every 6 hours PRN Nausea  senna 2 Tablet(s) Oral at bedtime PRN Constipation      Allergies    No Known Allergies    Intolerances        Vital Signs Last 24 Hrs  T(C): 36.4 (16 Apr 2018 08:15), Max: 36.6 (16 Apr 2018 00:59)  T(F): 97.5 (16 Apr 2018 08:15), Max: 97.8 (16 Apr 2018 00:59)  HR: 90 (16 Apr 2018 08:15) (87 - 91)  BP: 109/66 (16 Apr 2018 08:15) (109/66 - 111/66)  BP(mean): --  RR: 20 (16 Apr 2018 08:15) (18 - 20)  SpO2: 93% (16 Apr 2018 00:59) (93% - 93%)    PHYSICAL EXAM:    aaox3, nad  scleral icterus  rrr s1 s2 no murmurs  rhonchi at bases, otherwise good air entry bilaterally  abd firm, minimally distended. no rebound or gaurding, less tenderness than prviously.  no pitting edema  no rashes  no focal deficits      LABS:                        10.5   10.32 )-----------( 393      ( 16 Apr 2018 07:31 )             33.7     04-16    141  |  104  |  27<H>  ----------------------------<  184<H>  4.2   |  24  |  1.25    Ca    9.1      16 Apr 2018 07:00    TPro  6.2  /  Alb  2.4<L>  /  TBili  0.8  /  DBili  x   /  AST  58<H>  /  ALT  52<H>  /  AlkPhos  1587<H>  04-15          RADIOLOGY & ADDITIONAL STUDIES:

## 2018-04-16 NOTE — PROGRESS NOTE ADULT - ASSESSMENT
72F with metastatic ovarian ca with biliary colic s/p CBD stent    Plan:  -medicine consult  -GI consult given biliary stent  -NPO/IVF  -antibiotics  -abdominal exams  -Given advanced/metastatic disease, patient is a poor surgical candidate. If patient's pain/abdominal exam does not improve on antibiotics, consider IR percutaneous cholecystostomy (consult IR)  -Consider obtaining a HIDA  -Consider paracentesis to relieve some symptoms as well  -will continue to follow 72F with metastatic ovarian ca with biliary colic s/p CBD stent    Plan:  - NPO / IVF  - Antibiotics  - Recommend GI consult given biliary stent -- Consider obtaining a HIDA  - Given advanced/metastatic disease, patient is a poor surgical candidate. If patient's pain/abdominal exam does not improve on antibiotics, consider IR percutaneous cholecystostomy (consult IR)  - Also consider paracentesis to relieve some symptoms  - Will continue to follow

## 2018-04-16 NOTE — PROGRESS NOTE ADULT - SUBJECTIVE AND OBJECTIVE BOX
Pt seen. Awake, Alert  Reports feeling better  No abd pain. No N/V  No fevers overnight  MRI Brain w small 0.7 x 0.6 x 0.5 cm lesion in L Temporal Lobe and 3.3 cm met to L Parietal Bone    PAST MEDICAL & SURGICAL HISTORY:  Malignant neoplasm of ovary, unspecified laterality  S/P DENNY-BSO      ROS:  Negative except for: fatigue, weakness    MEDICATIONS  (STANDING):  ALBUTerol/ipratropium for Nebulization 3 milliLiter(s) Nebulizer every 6 hours  dexamethasone     Tablet 4 milliGRAM(s) Oral every 6 hours  dextrose 5%. 1000 milliLiter(s) (50 mL/Hr) IV Continuous <Continuous>  dextrose 50% Injectable 12.5 Gram(s) IV Push once  dextrose 50% Injectable 25 Gram(s) IV Push once  dextrose 50% Injectable 25 Gram(s) IV Push once  docusate sodium 100 milliGRAM(s) Oral three times a day  enoxaparin Injectable 40 milliGRAM(s) SubCutaneous every 24 hours  influenza   Vaccine 0.5 milliLiter(s) IntraMuscular once  insulin lispro (HumaLOG) corrective regimen sliding scale   SubCutaneous three times a day before meals  insulin lispro (HumaLOG) corrective regimen sliding scale   SubCutaneous at bedtime  lisinopril 40 milliGRAM(s) Oral daily  piperacillin/tazobactam IVPB. 3.375 Gram(s) IV Intermittent every 8 hours  sodium chloride 0.9%. 1000 milliLiter(s) (100 mL/Hr) IV Continuous <Continuous>    MEDICATIONS  (PRN):  acetaminophen   Tablet. 650 milliGRAM(s) Oral every 6 hours PRN Mild Pain (1 - 3)  dextrose Gel 1 Dose(s) Oral once PRN Blood Glucose LESS THAN 70 milliGRAM(s)/deciliter  glucagon  Injectable 1 milliGRAM(s) IntraMuscular once PRN Glucose LESS THAN 70 milligrams/deciliter  morphine  - Injectable 2 milliGRAM(s) IV Push every 4 hours PRN Moderate Pain (4 - 6)  morphine  - Injectable 4 milliGRAM(s) IV Push every 4 hours PRN Severe Pain (7 - 10)  ondansetron Injectable 4 milliGRAM(s) IV Push every 6 hours PRN Nausea  senna 2 Tablet(s) Oral at bedtime PRN Constipation      Allergies    No Known Allergies    Intolerances        Vital Signs Last 24 Hrs  T(C): 36.6 (16 Apr 2018 00:59), Max: 37.1 (15 Apr 2018 09:20)  T(F): 97.8 (16 Apr 2018 00:59), Max: 98.8 (15 Apr 2018 09:20)  HR: 87 (16 Apr 2018 05:31) (87 - 100)  BP: 110/70 (16 Apr 2018 05:31) (98/62 - 113/72)  BP(mean): --  RR: 18 (16 Apr 2018 00:59) (18 - 20)  SpO2: 93% (16 Apr 2018 00:59) (91% - 93%)    PHYSICAL EXAM    General: adult in NAD. Looks Non-Toxic  HEENT: clear oropharynx, anicteric sclera, pink conjunctiva  Neck: supple  CV: RRR S1,S2+   Lungs: positive air movement b/l . Diminished BS at bases, R>L  Abdomen: softly distended, mild diffuse tenderness  Ext: no clubbing cyanosis or edema  Skin: no rashes and no petechiae  Neuro: alert and oriented X 4, no focal deficits    Neuro: alert and oriented X 4, no focal deficits  LABS:                          10.3   8.44  )-----------( 305      ( 15 Apr 2018 08:12 )             32.7     Serial CBC's  04-15 @ 08:12  Hct-32.7 / Hgb-10.3 / Plat-305 / RBC-3.56 / WBC-8.44          Serial CBC's  04-14 @ 13:59  Hct-36.0 / Hgb-12.0 / Plat-375 / RBC-3.84 / WBC-9.7            04-15    140  |  102  |  29<H>  ----------------------------<  184<H>  4.7   |  24  |  1.44<H>    Ca    8.9      15 Apr 2018 07:11    TPro  6.2  /  Alb  2.4<L>  /  TBili  0.8  /  DBili  x   /  AST  58<H>  /  ALT  52<H>  /  AlkPhos  1587<H>  04-15      PT/INR - ( 14 Apr 2018 13:59 )   PT: 12.8 sec;   INR: 1.17 ratio         PTT - ( 14 Apr 2018 13:59 )  PTT:28.2 sec    WBC Count: 8.44 K/uL (04-15 @ 08:12)  Hemoglobin: 10.3 g/dL (04-15 @ 08:12)            RADIOLOGY & ADDITIONAL STUDIES:

## 2018-04-16 NOTE — PROGRESS NOTE ADULT - SUBJECTIVE AND OBJECTIVE BOX
Follow Up:      Interval History/ROS: general discomfort    Allergies  No Known Allergies    ANTIMICROBIALS:  piperacillin/tazobactam IVPB. 3.375 every 8 hours    OTHER MEDS:  MEDICATIONS  (STANDING):  acetaminophen   Tablet. 650 every 6 hours PRN  ALBUTerol/ipratropium for Nebulization 3 every 6 hours  dexamethasone     Tablet 4 every 6 hours  dextrose 50% Injectable 12.5 once  dextrose 50% Injectable 25 once  dextrose 50% Injectable 25 once  dextrose Gel 1 once PRN  docusate sodium 100 three times a day  enoxaparin Injectable 40 every 24 hours  glucagon  Injectable 1 once PRN  influenza   Vaccine 0.5 once  insulin lispro (HumaLOG) corrective regimen sliding scale  three times a day before meals  insulin lispro (HumaLOG) corrective regimen sliding scale  at bedtime  lisinopril 40 daily  morphine  - Injectable 2 every 4 hours PRN  morphine  - Injectable 4 every 4 hours PRN  ondansetron Injectable 4 every 6 hours PRN  senna 2 at bedtime PRN    Vital Signs Last 24 Hrs  T(C): 36.4 (16 Apr 2018 08:15), Max: 36.6 (16 Apr 2018 00:59)  T(F): 97.5 (16 Apr 2018 08:15), Max: 97.8 (16 Apr 2018 00:59)  HR: 90 (16 Apr 2018 08:15) (87 - 91)  BP: 109/66 (16 Apr 2018 08:15) (109/66 - 111/66)  BP(mean): --  RR: 20 (16 Apr 2018 08:15) (18 - 20)  SpO2: 93% (16 Apr 2018 00:59) (93% - 93%)    PHYSICAL EXAM:  General: WN/WD NAD, Non-toxic  Neurology: A&Ox3, nonfocal  Respiratory: Clear to auscultation bilaterally  CV: RRR, S1S2, no murmurs, rubs or gallops  Abdominal: Soft, Non-tender, non-distended  Extremities: 1+ edema,   Line Sites: Clear  Skin: No rash                        10.5   10.32 )-----------( 393      ( 16 Apr 2018 07:31 )             33.7       04-16    141  |  104  |  27<H>  ----------------------------<  184<H>  4.2   |  24  |  1.25    Ca    9.1      16 Apr 2018 07:00    TPro  6.2  /  Alb  2.4<L>  /  TBili  0.8  /  DBili  x   /  AST  58<H>  /  ALT  52<H>  /  AlkPhos  1587<H>  04-15          MICROBIOLOGY:  .Urine Clean Catch (Midstream)  04-14-18   10,000 - 49,000 CFU/mL Escherichia coli  --  Escherichia coli  Culture - Urine (04.14.18 @ 20:29)    -  Amikacin: S <=8    -  Amoxicillin/Clavulanic Acid: S <=8/4    -  Ampicillin: S 4 These ampicillin results predict results for amoxicillin    -  Ampicillin/Sulbactam: S <=4/2    -  Aztreonam: S <=4    -  Cefazolin: S <=2 This predicts results for oral agents cefaclor, cefdinir, cefpodoxime, cefprozil, cefuroxime axetil, cephalexin and locarbef for uncomplicated UTI. Note that some isolates may be susceptible to these agents while testing resistant to cefazolin.    -  Cefepime: S <=2    -  Cefoxitin: S <=4    -  Ceftriaxone: S <=1 Enterobacter, Citrobacter, and Serratia may develop resistance during prolonged therapy    -  Ciprofloxacin: S <=0.5    -  Ertapenem: S <=0.5    -  Gentamicin: S <=1    -  Imipenem: S <=1    -  Levofloxacin: S <=1    -  Meropenem: S <=1    -  Nitrofurantoin: S <=32 Should not be used to treat pyelonephritis    -  Piperacillin/Tazobactam: S <=8    -  Tigecycline: S <=1    -  Tobramycin: S <=2    -  Trimethoprim/Sulfamethoxazole: S <=0.5/9.5      .Blood Blood  04-14-18   Growth in aerobic and anaerobic bottles: Escherichia coli             Escherichia coli  Blood Culture PCR    v          RADIOLOGY:    Malik Montano MD; Division of Infectious Disease; Pager: [phone]; nights and weekends: [phone]

## 2018-04-16 NOTE — PROGRESS NOTE ADULT - ASSESSMENT
72F with metastatic ovarian cancer s/p DENNY/BSO last chemotherapy 12/2017, was admitted recently to [facility] for biliary obstructive s/p stent placement. Now admitted 4/14/18 with E. coli bacteremia, probably biliary source but recently treated UTI    Continue Zosyn  await susceptibilities of blood isolates  consider Disida or MRCP

## 2018-04-16 NOTE — PROGRESS NOTE ADULT - SUBJECTIVE AND OBJECTIVE BOX
Interventional Radiology Procedure Note    Procedure: image-guided therapeutic and diagnostic paracentesis     Indication: New onset ascites     Operators: LENNY Johnson    Anesthesia (type):    Contrast:     EBL:    Findings/Follow up Plan of Care:    Specimens Removed:    Implants:    Complications:    Condition/Disposition:    Please call Interventional Radiology x [phone] with any questions, concerns, or issues. Interventional Radiology Procedure Note    Procedure: image-guided therapeutic and diagnostic paracentesis     Indication: New onset ascites     Operators: LENNY Johnson  Supervising MD: Blaise Alegre    Anesthesia (type): Lidocaine 1%    Contrast: NONE    EBL: Minimal     Findings/Follow up Plan of Care: drained 1500 ml of clear yellow ascites via the RLQ abdominal approach.  pt tolerated procedure well.  hemostasis secure and VSS.  Dressing applied to site is C/D/I.      Specimens Removed: microbiology, chemistry, cell count    Implants: NONE    Complications: NONE    Condition/Disposition: Stable, Pt sent back to 8 Mon in stable condition.      Please call Interventional Radiology x [phone] with any questions, concerns, or issues.

## 2018-04-16 NOTE — CHART NOTE - NSCHARTNOTEFT_GEN_A_CORE
RADIATION MEDICINE:     72 year-old female with a history of metastatic ovarian cancer s/p resection and chemotherapy who presents with blurry vision, shortness of breath, and abdominal pain, found via CT to have a hyperdense left temporal lobe lesion with vasogenic edema suspicious for a metastasis, right greater than left pleural effusions, and abdominal ascites concerning for peritoneal carcinomatosis. The patient is currently neurologically stable. No acute neurosurgical intervention is indicated at this time per chart notes.     MRI 4/15:       FINDINGS:  There is 0.7 x 0.5 x 0.6 cm enhancing lesion in the left temporal lobe   with surrounding vasogenic edema likely representing a metastasis.    There is a 3.3 x 1 x 3 cm (AP by transverse by craniocaudal) enhancing   lesion in the left parietal bone with a permeative appearance. This is   not included within the field-of-view of the prior PET scan. This likely   represents a metastasis.    Ms. Jacome is a candidate for SRS/GK treatment which can only be done as an outpatient whether she comes from home or a rehab. facility.  Inpatient RT often only allows for WBRT which we do not feel is the best treatment in her case.   Any inpatient treatment also requires transfer to [facility] for care.   I have requested an outpatient appt. from our team with Dr. Phoenix within 1-2 weeks from now at: [address] [facility], [facility].     Should there be a change in her status please let us know at : [phone].      Once I have an appointment I will add an addendum or chart note.  Thank you. RADIATION MEDICINE:     72 year-old female with a history of metastatic ovarian cancer s/p resection and chemotherapy who presents with blurry vision, shortness of breath, and abdominal pain, found via CT to have a hyperdense left temporal lobe lesion with vasogenic edema suspicious for a metastasis, right greater than left pleural effusions, and abdominal ascites concerning for peritoneal carcinomatosis. The patient is currently neurologically stable. No acute neurosurgical intervention is indicated at this time per chart notes.     MRI 4/15:       FINDINGS:  There is 0.7 x 0.5 x 0.6 cm enhancing lesion in the left temporal lobe   with surrounding vasogenic edema likely representing a metastasis.    There is a 3.3 x 1 x 3 cm (AP by transverse by craniocaudal) enhancing   lesion in the left parietal bone with a permeative appearance. This is   not included within the field-of-view of the prior PET scan. This likely   represents a metastasis.    Ms. Jacome is a candidate for SRS/GK treatment which can only be done as an outpatient whether she comes from home or a rehab. facility.  Inpatient RT often only allows for WBRT which we do not feel is the best treatment in her case.   Any inpatient treatment also requires transfer to [facility] for care which we can help arrange if she cannot have outpatient treatment.     I have requested an outpatient appt. from our team with Dr. Phoenix at: [address] [facility].     Should there be a change in her status please let us know at : [phone].      Her appointment is for 4/26/18 at 1pm with Dr. Phoenix.       Thank you.

## 2018-04-17 LAB
-  AMIKACIN: SIGNIFICANT CHANGE UP
-  AMPICILLIN/SULBACTAM: SIGNIFICANT CHANGE UP
-  AMPICILLIN: SIGNIFICANT CHANGE UP
-  AZTREONAM: SIGNIFICANT CHANGE UP
-  CEFAZOLIN: SIGNIFICANT CHANGE UP
-  CEFEPIME: SIGNIFICANT CHANGE UP
-  CEFOXITIN: SIGNIFICANT CHANGE UP
-  CEFTRIAXONE: SIGNIFICANT CHANGE UP
-  CIPROFLOXACIN: SIGNIFICANT CHANGE UP
-  ERTAPENEM: SIGNIFICANT CHANGE UP
-  GENTAMICIN: SIGNIFICANT CHANGE UP
-  IMIPENEM: SIGNIFICANT CHANGE UP
-  LEVOFLOXACIN: SIGNIFICANT CHANGE UP
-  MEROPENEM: SIGNIFICANT CHANGE UP
-  PIPERACILLIN/TAZOBACTAM: SIGNIFICANT CHANGE UP
-  TOBRAMYCIN: SIGNIFICANT CHANGE UP
-  TRIMETHOPRIM/SULFAMETHOXAZOLE: SIGNIFICANT CHANGE UP
ALBUMIN SERPL ELPH-MCNC: 2.4 G/DL — LOW (ref 3.3–5)
ALP SERPL-CCNC: 1543 U/L — HIGH (ref 40–120)
ALT FLD-CCNC: 53 U/L RC — HIGH (ref 10–45)
AMYLASE P1 CFR SERPL: 78 U/L — SIGNIFICANT CHANGE UP (ref 25–125)
ANION GAP SERPL CALC-SCNC: 12 MMOL/L — SIGNIFICANT CHANGE UP (ref 5–17)
AST SERPL-CCNC: 62 U/L — HIGH (ref 10–40)
BASOPHILS # BLD AUTO: 0.03 K/UL — SIGNIFICANT CHANGE UP (ref 0–0.2)
BASOPHILS NFR BLD AUTO: 0.4 % — SIGNIFICANT CHANGE UP (ref 0–2)
BILIRUB SERPL-MCNC: 0.6 MG/DL — SIGNIFICANT CHANGE UP (ref 0.2–1.2)
BUN SERPL-MCNC: 26 MG/DL — HIGH (ref 7–23)
CALCIUM SERPL-MCNC: 8.9 MG/DL — SIGNIFICANT CHANGE UP (ref 8.4–10.5)
CHLORIDE SERPL-SCNC: 107 MMOL/L — SIGNIFICANT CHANGE UP (ref 96–108)
CO2 SERPL-SCNC: 24 MMOL/L — SIGNIFICANT CHANGE UP (ref 22–31)
COMMENT - FLUIDS: SIGNIFICANT CHANGE UP
CREAT SERPL-MCNC: 1.14 MG/DL — SIGNIFICANT CHANGE UP (ref 0.5–1.3)
CULTURE RESULTS: SIGNIFICANT CHANGE UP
CULTURE RESULTS: SIGNIFICANT CHANGE UP
EOSINOPHIL # BLD AUTO: 0.33 K/UL — SIGNIFICANT CHANGE UP (ref 0–0.5)
EOSINOPHIL NFR BLD AUTO: 4.5 % — SIGNIFICANT CHANGE UP (ref 0–6)
GLUCOSE SERPL-MCNC: 100 MG/DL — HIGH (ref 70–99)
HCT VFR BLD CALC: 31 % — LOW (ref 34.5–45)
HGB BLD-MCNC: 9.8 G/DL — LOW (ref 11.5–15.5)
IMM GRANULOCYTES NFR BLD AUTO: 0.1 % — SIGNIFICANT CHANGE UP (ref 0–1.5)
LIDOCAIN IGE QN: 103 U/L — HIGH (ref 7–60)
LYMPHOCYTES # BLD AUTO: 0.95 K/UL — LOW (ref 1–3.3)
LYMPHOCYTES # BLD AUTO: 13 % — SIGNIFICANT CHANGE UP (ref 13–44)
MCHC RBC-ENTMCNC: 29.3 PG — SIGNIFICANT CHANGE UP (ref 27–34)
MCHC RBC-ENTMCNC: 31.6 GM/DL — LOW (ref 32–36)
MCV RBC AUTO: 92.5 FL — SIGNIFICANT CHANGE UP (ref 80–100)
METHOD TYPE: SIGNIFICANT CHANGE UP
MONOCYTES # BLD AUTO: 0.73 K/UL — SIGNIFICANT CHANGE UP (ref 0–0.9)
MONOCYTES NFR BLD AUTO: 10 % — SIGNIFICANT CHANGE UP (ref 2–14)
NEUTROPHILS # BLD AUTO: 5.26 K/UL — SIGNIFICANT CHANGE UP (ref 1.8–7.4)
NEUTROPHILS NFR BLD AUTO: 72 % — SIGNIFICANT CHANGE UP (ref 43–77)
NIGHT BLUE STAIN TISS: SIGNIFICANT CHANGE UP
ORGANISM # SPEC MICROSCOPIC CNT: SIGNIFICANT CHANGE UP
PLATELET # BLD AUTO: 312 K/UL — SIGNIFICANT CHANGE UP (ref 150–400)
POTASSIUM SERPL-MCNC: 4 MMOL/L — SIGNIFICANT CHANGE UP (ref 3.5–5.3)
POTASSIUM SERPL-SCNC: 4 MMOL/L — SIGNIFICANT CHANGE UP (ref 3.5–5.3)
PROT SERPL-MCNC: 5.9 G/DL — LOW (ref 6–8.3)
RBC # BLD: 3.35 M/UL — LOW (ref 3.8–5.2)
RBC # FLD: 15.9 % — HIGH (ref 10.3–14.5)
SODIUM SERPL-SCNC: 143 MMOL/L — SIGNIFICANT CHANGE UP (ref 135–145)
SPECIMEN SOURCE: SIGNIFICANT CHANGE UP
WBC # BLD: 7.31 K/UL — SIGNIFICANT CHANGE UP (ref 3.8–10.5)
WBC # FLD AUTO: 7.31 K/UL — SIGNIFICANT CHANGE UP (ref 3.8–10.5)

## 2018-04-17 PROCEDURE — 99232 SBSQ HOSP IP/OBS MODERATE 35: CPT

## 2018-04-17 RX ORDER — FUROSEMIDE 40 MG
40 TABLET ORAL DAILY
Qty: 0 | Refills: 0 | Status: DISCONTINUED | OUTPATIENT
Start: 2018-04-17 | End: 2018-04-20

## 2018-04-17 RX ORDER — POLYETHYLENE GLYCOL 3350 17 G/17G
17 POWDER, FOR SOLUTION ORAL DAILY
Qty: 0 | Refills: 0 | Status: COMPLETED | OUTPATIENT
Start: 2018-04-17 | End: 2018-04-18

## 2018-04-17 RX ADMIN — Medication 1: at 17:24

## 2018-04-17 RX ADMIN — Medication 100 MILLIGRAM(S): at 21:12

## 2018-04-17 RX ADMIN — Medication 4 MILLIGRAM(S): at 05:48

## 2018-04-17 RX ADMIN — SODIUM CHLORIDE 100 MILLILITER(S): 9 INJECTION INTRAMUSCULAR; INTRAVENOUS; SUBCUTANEOUS at 20:16

## 2018-04-17 RX ADMIN — Medication 3 MILLILITER(S): at 17:24

## 2018-04-17 RX ADMIN — PIPERACILLIN AND TAZOBACTAM 25 GRAM(S): 4; .5 INJECTION, POWDER, LYOPHILIZED, FOR SOLUTION INTRAVENOUS at 05:49

## 2018-04-17 RX ADMIN — Medication 100 MILLIGRAM(S): at 13:47

## 2018-04-17 RX ADMIN — Medication 4 MILLIGRAM(S): at 09:59

## 2018-04-17 RX ADMIN — Medication 3 MILLILITER(S): at 14:12

## 2018-04-17 RX ADMIN — Medication 2: at 12:17

## 2018-04-17 RX ADMIN — Medication 3 MILLILITER(S): at 23:37

## 2018-04-17 RX ADMIN — PIPERACILLIN AND TAZOBACTAM 25 GRAM(S): 4; .5 INJECTION, POWDER, LYOPHILIZED, FOR SOLUTION INTRAVENOUS at 13:47

## 2018-04-17 RX ADMIN — LISINOPRIL 40 MILLIGRAM(S): 2.5 TABLET ORAL at 05:48

## 2018-04-17 RX ADMIN — Medication 4 MILLIGRAM(S): at 17:23

## 2018-04-17 RX ADMIN — Medication 3 MILLILITER(S): at 05:49

## 2018-04-17 RX ADMIN — PIPERACILLIN AND TAZOBACTAM 25 GRAM(S): 4; .5 INJECTION, POWDER, LYOPHILIZED, FOR SOLUTION INTRAVENOUS at 21:12

## 2018-04-17 RX ADMIN — SODIUM CHLORIDE 100 MILLILITER(S): 9 INJECTION INTRAMUSCULAR; INTRAVENOUS; SUBCUTANEOUS at 05:49

## 2018-04-17 RX ADMIN — Medication 4 MILLIGRAM(S): at 21:12

## 2018-04-17 RX ADMIN — Medication 100 MILLIGRAM(S): at 05:48

## 2018-04-17 NOTE — PROGRESS NOTE ADULT - ASSESSMENT
72F with metastatic ovarian cancer s/p DENNY/BSO last chemotherapy 12/2017, was admitted recently to [facility] for biliary obstructive s/p stent placement. Now admitted 4/14/18 with E. coli bacteremia, probably biliary source but recently treated UTI  GI consultation appreciated; endoscopic biliary drainage to be considered  no percutaneous cholecystectomy required as per surgery    Continue Zosyn 4/14-->    I will be out 4/18 - Please call ID service if needed

## 2018-04-17 NOTE — PROGRESS NOTE ADULT - SUBJECTIVE AND OBJECTIVE BOX
S: Patient abdominal pain significantly improved following IR. HIDA consistent with post-stent; denies fevers, chills, nausea, emesis, chest pain, SOB.    O: Vital Signs Last 24 Hrs  T(C): 37.2 (17 Apr 2018 07:29), Max: 37.2 (17 Apr 2018 07:29)  T(F): 98.9 (17 Apr 2018 07:29), Max: 98.9 (17 Apr 2018 07:29)  HR: 74 (17 Apr 2018 07:29) (74 - 101)  BP: 105/60 (17 Apr 2018 07:29) (105/60 - 124/74)  BP(mean): --  RR: 18 (17 Apr 2018 07:29) (18 - 18)  SpO2: 93% (17 Apr 2018 07:29) (93% - 94%)  I&O's Detail    16 Apr 2018 07:01  -  17 Apr 2018 07:00  --------------------------------------------------------  IN:    IV PiggyBack: 100 mL    Oral Fluid: 780 mL  Total IN: 880 mL    OUT:  Total OUT: 0 mL    Total NET: 880 mL        General: alert and oriented, NAD  Resp: airway patent, respirations unlabored  CVS: regular rate and rhythm  Abdomen: soft, mildly tender, nondistended  Extremities: no edema  Skin: warm, dry, appropriate color                          9.8    7.31  )-----------( 312      ( 17 Apr 2018 07:41 )             31.0   04-17    143  |  107  |  26<H>  ----------------------------<  100<H>  4.0   |  24  |  1.14    Ca    8.9      17 Apr 2018 06:14    TPro  5.9<L>  /  Alb  2.4<L>  /  TBili  0.6  /  DBili  x   /  AST  62<H>  /  ALT  53<H>  /  AlkPhos  1543<H>  04-17 S: Patient abdominal pain significantly improved following paracentesis. HIDA consistent with post-stent; denies fevers, chills, nausea, emesis, chest pain, SOB.    O: Vital Signs Last 24 Hrs  T(C): 37.2 (17 Apr 2018 07:29), Max: 37.2 (17 Apr 2018 07:29)  T(F): 98.9 (17 Apr 2018 07:29), Max: 98.9 (17 Apr 2018 07:29)  HR: 74 (17 Apr 2018 07:29) (74 - 101)  BP: 105/60 (17 Apr 2018 07:29) (105/60 - 124/74)  BP(mean): --  RR: 18 (17 Apr 2018 07:29) (18 - 18)  SpO2: 93% (17 Apr 2018 07:29) (93% - 94%)  I&O's Detail    16 Apr 2018 07:01  -  17 Apr 2018 07:00  --------------------------------------------------------  IN:    IV PiggyBack: 100 mL    Oral Fluid: 780 mL  Total IN: 880 mL    OUT:  Total OUT: 0 mL    Total NET: 880 mL        General: alert and oriented, NAD  Resp: airway patent, respirations unlabored  CVS: regular rate and rhythm  Abdomen: soft, mildly tender, nondistended  Extremities: no edema  Skin: warm, dry, appropriate color                          9.8    7.31  )-----------( 312      ( 17 Apr 2018 07:41 )             31.0   04-17    143  |  107  |  26<H>  ----------------------------<  100<H>  4.0   |  24  |  1.14    Ca    8.9      17 Apr 2018 06:14    TPro  5.9<L>  /  Alb  2.4<L>  /  TBili  0.6  /  DBili  x   /  AST  62<H>  /  ALT  53<H>  /  AlkPhos  1543<H>  04-17

## 2018-04-17 NOTE — CONSULT NOTE ADULT - ASSESSMENT
73 yo woman with metastatic ovarian Ca with peritoneal carcinomatosis and ascites (presumably malignant ascites) with recent ERCP and stent placement for biliary obstruction presently admitted with abd pain that is likely secondary to her metastatic ovarian cancer and peritoneal carcinomatosis. Biliary stent appears patent based on pneumobilia and Tbili < 1.0. Surgery team is evaluating patient for cholecystitis due to gallstones with cystic duct stone. Patient tate not have leukocytosis or fever, which works against cholecystitis. Will further discuss endoscopic drainage possibilities with advance endoscopy attending. 73 yo woman with metastatic ovarian Ca with peritoneal carcinomatosis and ascites (presumably malignant ascites) with recent ERCP and stent placement for biliary obstruction presently admitted with abd pain that is likely secondary to her metastatic ovarian cancer and peritoneal carcinomatosis. She feels better in terms of her abd distention, bloating and pain since undergoing paracentesis yesterday. Biliary stent appears patent based on pneumobilia and Tbili < 1.0. Surgery team is evaluating patient for cholecystitis due to gallstones with cystic duct stone. She has E coli bacteremia of uncertain source - possibly biliary (gallbladder) vs  source. paracentesis yesterday was without neutrophilia pointing away from spontaneous bacterial peritonitis as the source. Will further discuss endoscopic drainage possibilities with advance endoscopy attending.

## 2018-04-17 NOTE — PROGRESS NOTE ADULT - SUBJECTIVE AND OBJECTIVE BOX
Pt seen and examined  Feels much better  Less Abd Pain. In fact minimal discomfort  No N/V  No fevers  HIDA Scan results noted. Possible False positive in light of stent      PAST MEDICAL & SURGICAL HISTORY:  Malignant neoplasm of ovary, unspecified laterality  S/P DENNY-BSO      ROS:  Negative except for: as above    MEDICATIONS  (STANDING):  ALBUTerol/ipratropium for Nebulization 3 milliLiter(s) Nebulizer every 6 hours  dexamethasone     Tablet 4 milliGRAM(s) Oral every 6 hours  dextrose 5%. 1000 milliLiter(s) (50 mL/Hr) IV Continuous <Continuous>  dextrose 50% Injectable 12.5 Gram(s) IV Push once  dextrose 50% Injectable 25 Gram(s) IV Push once  dextrose 50% Injectable 25 Gram(s) IV Push once  docusate sodium 100 milliGRAM(s) Oral three times a day  enoxaparin Injectable 40 milliGRAM(s) SubCutaneous every 24 hours  influenza   Vaccine 0.5 milliLiter(s) IntraMuscular once  insulin lispro (HumaLOG) corrective regimen sliding scale   SubCutaneous three times a day before meals  insulin lispro (HumaLOG) corrective regimen sliding scale   SubCutaneous at bedtime  lisinopril 40 milliGRAM(s) Oral daily  piperacillin/tazobactam IVPB. 3.375 Gram(s) IV Intermittent every 8 hours  sodium chloride 0.9%. 1000 milliLiter(s) (100 mL/Hr) IV Continuous <Continuous>    MEDICATIONS  (PRN):  acetaminophen   Tablet. 650 milliGRAM(s) Oral every 6 hours PRN Mild Pain (1 - 3)  dextrose Gel 1 Dose(s) Oral once PRN Blood Glucose LESS THAN 70 milliGRAM(s)/deciliter  glucagon  Injectable 1 milliGRAM(s) IntraMuscular once PRN Glucose LESS THAN 70 milligrams/deciliter  morphine  - Injectable 2 milliGRAM(s) IV Push every 4 hours PRN Moderate Pain (4 - 6)  morphine  - Injectable 4 milliGRAM(s) IV Push every 4 hours PRN Severe Pain (7 - 10)  ondansetron Injectable 4 milliGRAM(s) IV Push every 6 hours PRN Nausea  senna 2 Tablet(s) Oral at bedtime PRN Constipation      Allergies    No Known Allergies    Intolerances        Vital Signs Last 24 Hrs  T(C): 36.9 (16 Apr 2018 23:43), Max: 36.9 (16 Apr 2018 23:43)  T(F): 98.4 (16 Apr 2018 23:43), Max: 98.4 (16 Apr 2018 23:43)  HR: 89 (17 Apr 2018 05:56) (89 - 101)  BP: 124/74 (17 Apr 2018 05:56) (109/66 - 124/74)  BP(mean): --  RR: 18 (16 Apr 2018 23:43) (18 - 20)  SpO2: 94% (16 Apr 2018 23:43) (94% - 94%)    PHYSICAL EXAM    General: adult in NAD. Looks Non-Toxic  HEENT: clear oropharynx, anicteric sclera, pink conjunctiva  Neck: supple  CV: RRR S1,S2+   Lungs: positive air movement b/l . Diminished BS at bases, R>L  Abdomen: softly distended, non tender  Ext: no clubbing cyanosis or edema  Skin: no rashes and no petechiae  Neuro: alert and oriented X 4, no focal deficits    LABS:                          10.5   10.32 )-----------( 393      ( 16 Apr 2018 07:31 )             33.7     Serial CBC's  04-16 @ 07:31  Hct-33.7 / Hgb-10.5 / Plat-393 / RBC-3.59 / WBC-10.32          Serial CBC's  04-15 @ 08:12  Hct-32.7 / Hgb-10.3 / Plat-305 / RBC-3.56 / WBC-8.44            04-17    143  |  107  |  26<H>  ----------------------------<  100<H>  4.0   |  24  |  1.14    Ca    8.9      17 Apr 2018 06:14    TPro  5.9<L>  /  Alb  2.4<L>  /  TBili  0.6  /  DBili  x   /  AST  62<H>  /  ALT  53<H>  /  AlkPhos  1543<H>  04-17          WBC Count: 10.32 K/uL (04-16 @ 07:31)  Hemoglobin: 10.5 g/dL (04-16 @ 07:31)            RADIOLOGY & ADDITIONAL STUDIES:

## 2018-04-17 NOTE — PROGRESS NOTE ADULT - ASSESSMENT
1. E. coli Bacteremia/Sepsis    -- cont IV Zosyn  -- ID following  -- suspect GB as source    2. Cholecystitis, recent biliary stent    -- Surgery input appreciated  -- HIDA scan s/o cholecystitis but may be false positive  -- GI Consult. ? ERCP      3. Small L temporal Lobe Brain Met    -- Rad Onc Input appreciated  -- Outpt SRS  -- cont Dexa 4mg PO Q 6hrs. GI Prophylaxis w PPI  -- NeuroSx follow up      4. Pitts Refractory Metastatic Ovarian CA w Peritoneal Carcinomatosis, Adrenal Mets , likely malignant ascites and ? new brain met. Last Chemo was Carboplatin + Doxil on 9/19/17. Refused Maintenance Olaparib. Recurrence noted on CT on 3/6/18.    -- pt previously declined further chemo but having second thoughts. If she recovers from acute illness would be candidate for palliative chemo with Gemcitabine +/- Bevacizumab      5. GOC    -- full code   -- considering further tx in future    Jose Fonseca MD  cell # [phone]

## 2018-04-17 NOTE — PROGRESS NOTE ADULT - ATTENDING COMMENTS
I saw and examined the pt and discussed the tx plan with the House Staff. I agree with the exam and plan as documented in the surgery resident's note from today which I edited as indicated.  Better after paracentesis.  Abdomen soft, obese, with mild lower abd tenderness, no peritoneal signs.  Unclear whether she has acute cholecystitis. Given improvement after paracentesis, would not pursue perc lexi and follow clinically. D/w Dr Fonseca.  Will sign off at this time, pls call back with questions/concerns.   Wendy Keyes MD

## 2018-04-17 NOTE — PROGRESS NOTE ADULT - ASSESSMENT
72F with metastatic ovarian ca with biliary colic s/p CBD stent    Plan:  - NPO / IVF  - Antibiotics  - Improving with stent/abx, no surgical intervention at this time. Given advanced/metastatic disease, patient is a poor surgical candidate.    [facility] [id number] 72F with metastatic ovarian ca with abd pain, h/o CBD stent    Plan:  - Antibiotics  - Improving with paracentesis, no further intervention at this time. Given advanced/metastatic disease, patient is a poor surgical candidate.    [facility] [id number] 72F with metastatic ovarian ca with abd pain, h/o CBD stent    Plan:  - Antibiotics  - Improving with paracentesis, no further intervention at this time. Given advanced/metastatic disease, patient is a poor surgical candidate.  - No need for perc cholecystostomy  - Please call with any further questions or concerns.    [facility] [id number]

## 2018-04-17 NOTE — PROGRESS NOTE ADULT - SUBJECTIVE AND OBJECTIVE BOX
Follow Up:      Interval History/ROS: feels better, ambulating    Allergies  No Known Allergies    ANTIMICROBIALS:  piperacillin/tazobactam IVPB. 3.375 every 8 hours    OTHER MEDS:  MEDICATIONS  (STANDING):  acetaminophen   Tablet. 650 every 6 hours PRN  ALBUTerol/ipratropium for Nebulization 3 every 6 hours  dexamethasone     Tablet 4 every 6 hours  dextrose 50% Injectable 12.5 once  dextrose 50% Injectable 25 once  dextrose 50% Injectable 25 once  dextrose Gel 1 once PRN  docusate sodium 100 three times a day  enoxaparin Injectable 40 every 24 hours  glucagon  Injectable 1 once PRN  influenza   Vaccine 0.5 once  insulin lispro (HumaLOG) corrective regimen sliding scale  three times a day before meals  insulin lispro (HumaLOG) corrective regimen sliding scale  at bedtime  lisinopril 40 daily  morphine  - Injectable 2 every 4 hours PRN  morphine  - Injectable 4 every 4 hours PRN  ondansetron Injectable 4 every 6 hours PRN  senna 2 at bedtime PRN    Vital Signs Last 24 Hrs  T(C): 36.6 (17 Apr 2018 17:01), Max: 37.2 (17 Apr 2018 07:29)  T(F): 97.9 (17 Apr 2018 17:01), Max: 98.9 (17 Apr 2018 07:29)  HR: 98 (17 Apr 2018 17:01) (74 - 101)  BP: 128/74 (17 Apr 2018 17:01) (105/60 - 128/74)  BP(mean): --  RR: 20 (17 Apr 2018 17:01) (18 - 20)  SpO2: 95% (17 Apr 2018 17:01) (93% - 95%)    PHYSICAL EXAM:  General: WN/WD NAD, Non-toxic  Neurology: A&Ox3, nonfocal  Respiratory: Clear to auscultation bilaterally  CV: RRR, S1S2, no murmurs, rubs or gallops  Abdominal: Soft, Non-tender, non-distended, normal bowel sounds  Extremities: 1+ edema,   Line Sites: Clear  Skin: No rash                        9.8    7.31  )-----------( 312      ( 17 Apr 2018 07:41 )             31.0       04-17    143  |  107  |  26<H>  ----------------------------<  100<H>  4.0   |  24  |  1.14    Ca    8.9      17 Apr 2018 06:14    TPro  5.9<L>  /  Alb  2.4<L>  /  TBili  0.6  /  DBili  x   /  AST  62<H>  /  ALT  53<H>  /  AlkPhos  1543<H>  04-17        MICROBIOLOGY:  .Body Fluid Peritoneal Fluid  04-16-18   No growth to date.  --    polymorphonuclear leukocytes seen  No organisms seen  by cytocentrifuge    .Blood Blood  04-15-18   No growth to date.  --  --      .Urine Clean Catch (Midstream)  04-14-18   10,000 - 49,000 CFU/mL Escherichia coli  --  Escherichia coli      .Blood Blood  04-14-18   Growth in aerobic and anaerobic bottles: Escherichia coli      RADIOLOGY:  < from: NM Hepatobiliary Scan w/wo GB w/Pharm (04.16.18 @ 19:11) >  IMPRESSION: Abnormal morphine-augmented hepatobiliary scan:   Nonvisualization of the gallbladder. While this is consistent with acute   cholecystitis, nonvisualization of the gallbladder may also be secondary   to the presence of a biliary stent.    < end of copied text >      Malik Montano MD; Division of Infectious Disease; Pager: [phone]; nights and weekends: [phone]

## 2018-04-17 NOTE — PROGRESS NOTE ADULT - SUBJECTIVE AND OBJECTIVE BOX
patient seen and examined. states improved abdominal pain since paracentesis yesterday. no fever or chills. admits to constipation, no BM since admission.  No chest pain or SOB.  no cough.  mild lower extremity swelling.     MEDICATIONS  (STANDING):  ALBUTerol/ipratropium for Nebulization 3 milliLiter(s) Nebulizer every 6 hours  dexamethasone     Tablet 4 milliGRAM(s) Oral every 6 hours  dextrose 5%. 1000 milliLiter(s) (50 mL/Hr) IV Continuous <Continuous>  dextrose 50% Injectable 12.5 Gram(s) IV Push once  dextrose 50% Injectable 25 Gram(s) IV Push once  dextrose 50% Injectable 25 Gram(s) IV Push once  docusate sodium 100 milliGRAM(s) Oral three times a day  enoxaparin Injectable 40 milliGRAM(s) SubCutaneous every 24 hours  influenza   Vaccine 0.5 milliLiter(s) IntraMuscular once  insulin lispro (HumaLOG) corrective regimen sliding scale   SubCutaneous three times a day before meals  insulin lispro (HumaLOG) corrective regimen sliding scale   SubCutaneous at bedtime  lisinopril 40 milliGRAM(s) Oral daily  piperacillin/tazobactam IVPB. 3.375 Gram(s) IV Intermittent every 8 hours  sodium chloride 0.9%. 1000 milliLiter(s) (100 mL/Hr) IV Continuous <Continuous>    MEDICATIONS  (PRN):  acetaminophen   Tablet. 650 milliGRAM(s) Oral every 6 hours PRN Mild Pain (1 - 3)  dextrose Gel 1 Dose(s) Oral once PRN Blood Glucose LESS THAN 70 milliGRAM(s)/deciliter  glucagon  Injectable 1 milliGRAM(s) IntraMuscular once PRN Glucose LESS THAN 70 milligrams/deciliter  morphine  - Injectable 2 milliGRAM(s) IV Push every 4 hours PRN Moderate Pain (4 - 6)  morphine  - Injectable 4 milliGRAM(s) IV Push every 4 hours PRN Severe Pain (7 - 10)  ondansetron Injectable 4 milliGRAM(s) IV Push every 6 hours PRN Nausea  senna 2 Tablet(s) Oral at bedtime PRN Constipation      Allergies    No Known Allergies    Intolerances        Vital Signs Last 24 Hrs  T(C): 36.6 (17 Apr 2018 17:01), Max: 37.2 (17 Apr 2018 07:29)  T(F): 97.9 (17 Apr 2018 17:01), Max: 98.9 (17 Apr 2018 07:29)  HR: 98 (17 Apr 2018 17:01) (74 - 101)  BP: 128/74 (17 Apr 2018 17:01) (105/60 - 128/74)  BP(mean): --  RR: 20 (17 Apr 2018 17:01) (18 - 20)  SpO2: 95% (17 Apr 2018 17:01) (93% - 95%)    PHYSICAL EXAM:    aaox3, nad  scleral icterus, improved.  clear lungs, no rhonchi or wheezing  rrr s1 s2 no murmurs  abd firm, distended, no ttp no rebound or guarding  1+ edema in both legs to mid shin  no rashes noted  no calf tenderness  no focal deficits      LABS:                        9.8    7.31  )-----------( 312      ( 17 Apr 2018 07:41 )             31.0     04-17    143  |  107  |  26<H>  ----------------------------<  100<H>  4.0   |  24  |  1.14    Ca    8.9      17 Apr 2018 06:14    TPro  5.9<L>  /  Alb  2.4<L>  /  TBili  0.6  /  DBili  x   /  AST  62<H>  /  ALT  53<H>  /  AlkPhos  1543<H>  04-17          RADIOLOGY & ADDITIONAL STUDIES:

## 2018-04-17 NOTE — CONSULT NOTE ADULT - SUBJECTIVE AND OBJECTIVE BOX
Chief Complaint:  Patient is a 72y old  Female who presents with a chief complaint of abdominal pain, blurry vision, SOB (14 Apr 2018 23:00)      HPI: 72 year old woman with metastatic ovarian cancer (Dx 2008, s/p DENNY/BSO/chemo with recurrence 2017, last chemo 12/2017) and COPD, now presenting with progressive abdominal pain. She was hospitalized at [facility] for abdominal pain and jaundice in March and was found to have biliary obstruction from metastatic focus. She underwent ERCP and had a CBD stent placed.  Following ERCP her jaundice improved but her abdominal pain has persisted. She describes the abdominal pain as intermittent diffuse pain, that is 7/10 in severity and worse in the periumbilical and left flank. The pain is aggravated with cough and deep breathing.  She denies worsening of her pain after eating however she reports poor appetite and early satiety. She notes weight loss. She had intermittent nausea and vomiting, however this improved after ERCP. She endorses chronic constipation. She also notes generalized fatigue and lethargy. She denies fevers, chills.        Allergies:  No Known Allergies      Home Medications:    Hospital Medications:  acetaminophen   Tablet. 650 milliGRAM(s) Oral every 6 hours PRN  ALBUTerol/ipratropium for Nebulization 3 milliLiter(s) Nebulizer every 6 hours  dexamethasone     Tablet 4 milliGRAM(s) Oral every 6 hours  docusate sodium 100 milliGRAM(s) Oral three times a day  enoxaparin Injectable 40 milliGRAM(s) SubCutaneous every 24 hours  glucagon  Injectable 1 milliGRAM(s) IntraMuscular once PRN  influenza   Vaccine 0.5 milliLiter(s) IntraMuscular once  insulin lispro (HumaLOG) corrective regimen sliding scale   SubCutaneous three times a day before meals  insulin lispro (HumaLOG) corrective regimen sliding scale   SubCutaneous at bedtime  lisinopril 40 milliGRAM(s) Oral daily  morphine  - Injectable 2 milliGRAM(s) IV Push every 4 hours PRN  morphine  - Injectable 4 milliGRAM(s) IV Push every 4 hours PRN  ondansetron Injectable 4 milliGRAM(s) IV Push every 6 hours PRN  piperacillin/tazobactam IVPB. 3.375 Gram(s) IV Intermittent every 8 hours  senna 2 Tablet(s) Oral at bedtime PRN  sodium chloride 0.9%. 1000 milliLiter(s) IV Continuous <Continuous>      PMHX/PSHX:  Malignant neoplasm of ovary, unspecified laterality  S/P DENNY-BSO      Family history:      Social History:     ROS:     General:  (+) wt loss, (-) fevers, (-) chills, (-) night sweats, (+) fatigue   Eyes:  Good vision, no reported pain  ENT:  No sore throat, pain, runny nose  CV:  No chest pain, SOB, palpitations, orthopnea  Resp:  No cough, SOB, wheezing  GI:  See HPI  :  No pain, bleeding, incontinence, nocturia  Muscle:  No pain, weakness  Neuro:  No weakness, tingling, memory problems  Psych:  No fatigue, insomnia, mood problems, depression  Endocrine:  No cold/heat intolerance  Heme:  No petechiae, ecchymosis, easy bruising  Skin:  No rash, edema      PHYSICAL EXAM:     GENERAL: NAD  HEENT: sclera anicteric  CHEST: CTAB  HEART:  RRR, no MRG, no edema  ABDOMEN:  Soft, mildly tender throughout, non-distended  EXTREMITIES:  no cyanosis, or edema  SKIN:  No rash  NEURO:  Alert, orientedx3     Vital Signs:  Vital Signs Last 24 Hrs  T(C): 37.2 (17 Apr 2018 07:29), Max: 37.2 (17 Apr 2018 07:29)  T(F): 98.9 (17 Apr 2018 07:29), Max: 98.9 (17 Apr 2018 07:29)  HR: 74 (17 Apr 2018 07:29) (74 - 101)  BP: 105/60 (17 Apr 2018 07:29) (105/60 - 124/74)  RR: 18 (17 Apr 2018 07:29) (18 - 18)  SpO2: 93% (17 Apr 2018 07:29) (93% - 94%)  Daily     Daily     LABS:                        9.8    7.31  )-----------( 312      ( 17 Apr 2018 07:41 )             31.0     04-17    143  |  107  |  26<H>  ----------------------------<  100<H>  4.0   |  24  |  1.14    Ca    8.9      17 Apr 2018 06:14    TPro  5.9<L>  /  Alb  2.4<L>  /  TBili  0.6  /  DBili  x   /  AST  62<H>  /  ALT  53<H>  /  AlkPhos  1543<H>  04-17    LIVER FUNCTIONS - ( 17 Apr 2018 06:14 )  Alb: 2.4 g/dL / Pro: 5.9 g/dL / ALK PHOS: 1543 U/L / ALT: 53 U/L RC / AST: 62 U/L / GGT: x               Amylase Serum78      Lipase [id number]     Imaging:  < from: CT Abdomen and Pelvis w/ Oral Cont and w/ IV Cont (04.14.18 @ 16:46) >  IMPRESSION:       No bowel obstruction.    Peripancreatic inflammatory change with 3.1 x 2.0 cm low-density lesion   along the anterior body concerning for serosal metastasis. Acute   pancreatitis could have a similar appearance. Correlate with physical   exam and lipase level.    Distended gallbladder with numerous stones, one of which appears to be in   the neck of the gallbladder. Thickening of the gallbladder wall.   Ultrasound or DISIDA scan may be obtained to assess for acute   cholecystitis.    Pneumobilia, consistent with the presence of a biliary stent. Additional   peripheral linear foci of gas in the left lobe of the liver may also be   related to pneumobilia versus, somewhat less likely, portal venous gas.     Bilateral adrenal masses, suspicious for metastatic disease.    Moderate ascites. Concern for peritoneal carcinomatosis.    < end of copied text > Chief Complaint:  Patient is a 72y old  Female who presents with a chief complaint of abdominal pain, blurry vision, SOB (14 Apr 2018 23:00)      HPI: 72 year old woman with metastatic ovarian cancer (Dx 2008, s/p DENNY/BSO/chemo with recurrence 2017, last chemo 12/2017) and COPD, now presenting with progressive abdominal pain. She was hospitalized at [facility] for abdominal pain and jaundice in March and was found to have biliary obstruction from metastatic focus. She underwent ERCP and had a CBD stent placed.  Following ERCP her jaundice improved but her abdominal pain has persisted. She describes the abdominal pain as marked by abdominal distention, bloating and lower abd pressure. The pain is aggravated with movement, cough and deep breathing.  She denies worsening of her pain after eating however she reports poor appetite and early satiety. She notes ~10 lbs weight loss. She had intermittent nausea and vomiting, however this improved after ERCP. She endorses chronic constipation. She also notes generalized fatigue and lethargy. She denies fevers, chills.      This admission patient was found to have E coli bacteremia of uncertain source - ? UTI vs biliary. She is currently on antibiotics with zosyn and has had no fevers since admission to the [facility].       Allergies:  No Known Allergies      Home Medications:    Hospital Medications:  acetaminophen   Tablet. 650 milliGRAM(s) Oral every 6 hours PRN  ALBUTerol/ipratropium for Nebulization 3 milliLiter(s) Nebulizer every 6 hours  dexamethasone     Tablet 4 milliGRAM(s) Oral every 6 hours  docusate sodium 100 milliGRAM(s) Oral three times a day  enoxaparin Injectable 40 milliGRAM(s) SubCutaneous every 24 hours  glucagon  Injectable 1 milliGRAM(s) IntraMuscular once PRN  influenza   Vaccine 0.5 milliLiter(s) IntraMuscular once  insulin lispro (HumaLOG) corrective regimen sliding scale   SubCutaneous three times a day before meals  insulin lispro (HumaLOG) corrective regimen sliding scale   SubCutaneous at bedtime  lisinopril 40 milliGRAM(s) Oral daily  morphine  - Injectable 2 milliGRAM(s) IV Push every 4 hours PRN  morphine  - Injectable 4 milliGRAM(s) IV Push every 4 hours PRN  ondansetron Injectable 4 milliGRAM(s) IV Push every 6 hours PRN  piperacillin/tazobactam IVPB. 3.375 Gram(s) IV Intermittent every 8 hours  senna 2 Tablet(s) Oral at bedtime PRN  sodium chloride 0.9%. 1000 milliLiter(s) IV Continuous <Continuous>      PMHX/PSHX:  Malignant neoplasm of ovary, unspecified laterality  S/P DENNY-BSO      Family history:      Social History:     ROS:     General:  (+) wt loss, (-) fevers, (-) chills, (-) night sweats, (+) fatigue   Eyes:  Good vision, no reported pain  ENT:  No sore throat, pain, runny nose  CV:  No chest pain, SOB, palpitations, orthopnea  Resp:  No cough, SOB, wheezing  GI:  See HPI  :  No pain, bleeding, incontinence, nocturia  Muscle:  No pain, weakness  Neuro:  No weakness, tingling, memory problems  Psych:  No fatigue, insomnia, mood problems, depression  Endocrine:  No cold/heat intolerance  Heme:  No petechiae, ecchymosis, easy bruising  Skin:  No rash, edema      PHYSICAL EXAM:     GENERAL: NAD  HEENT: sclera anicteric  CHEST: CTAB  HEART:  RRR, no MRG, no edema  ABDOMEN:  Soft, moderately distended, minimally tender throughout, no guarding/rigidity/rebound, (-) Ramirez's sign  EXTREMITIES:  no cyanosis, or edema  SKIN:  No rash  NEURO:  Alert, orientedx3     Vital Signs:  Vital Signs Last 24 Hrs  T(C): 37.2 (17 Apr 2018 07:29), Max: 37.2 (17 Apr 2018 07:29)  T(F): 98.9 (17 Apr 2018 07:29), Max: 98.9 (17 Apr 2018 07:29)  HR: 74 (17 Apr 2018 07:29) (74 - 101)  BP: 105/60 (17 Apr 2018 07:29) (105/60 - 124/74)  RR: 18 (17 Apr 2018 07:29) (18 - 18)  SpO2: 93% (17 Apr 2018 07:29) (93% - 94%)  Daily     Daily     LABS:                        9.8    7.31  )-----------( 312      ( 17 Apr 2018 07:41 )             31.0     04-17    143  |  107  |  26<H>  ----------------------------<  100<H>  4.0   |  24  |  1.14    Ca    8.9      17 Apr 2018 06:14    TPro  5.9<L>  /  Alb  2.4<L>  /  TBili  0.6  /  DBili  x   /  AST  62<H>  /  ALT  53<H>  /  AlkPhos  1543<H>  04-17    LIVER FUNCTIONS - ( 17 Apr 2018 06:14 )  Alb: 2.4 g/dL / Pro: 5.9 g/dL / ALK PHOS: 1543 U/L / ALT: 53 U/L RC / AST: 62 U/L / GGT: x               Amylase Serum78      Lipase [id number]     Imaging:  < from: CT Abdomen and Pelvis w/ Oral Cont and w/ IV Cont (04.14.18 @ 16:46) >  IMPRESSION:       No bowel obstruction.    Peripancreatic inflammatory change with 3.1 x 2.0 cm low-density lesion   along the anterior body concerning for serosal metastasis. Acute   pancreatitis could have a similar appearance. Correlate with physical   exam and lipase level.    Distended gallbladder with numerous stones, one of which appears to be in   the neck of the gallbladder. Thickening of the gallbladder wall.   Ultrasound or DISIDA scan may be obtained to assess for acute   cholecystitis.    Pneumobilia, consistent with the presence of a biliary stent. Additional   peripheral linear foci of gas in the left lobe of the liver may also be   related to pneumobilia versus, somewhat less likely, portal venous gas.     Bilateral adrenal masses, suspicious for metastatic disease.    Moderate ascites. Concern for peritoneal carcinomatosis.    < end of copied text >

## 2018-04-17 NOTE — PROGRESS NOTE ADULT - ASSESSMENT
This is a 72 year old female with metastatic ovarian cancer (Dx 2008, s/p DENNY/BSO, chemo with recurrence 2017 and repeat chemo, last 12/2017), COPD, presenting with abdominal pain, shortness of breath and blurry vision.  Patient with new intracranial lesion c/f metastasis, found to have gram negative bacteremia possible GB source v.  source

## 2018-04-18 DIAGNOSIS — K83.8 OTHER SPECIFIED DISEASES OF BILIARY TRACT: ICD-10-CM

## 2018-04-18 LAB
ALBUMIN SERPL ELPH-MCNC: 2.5 G/DL — LOW (ref 3.3–5)
ALP SERPL-CCNC: 1602 U/L — HIGH (ref 40–120)
ALT FLD-CCNC: 72 U/L — HIGH (ref 10–45)
ANION GAP SERPL CALC-SCNC: 15 MMOL/L — SIGNIFICANT CHANGE UP (ref 5–17)
AST SERPL-CCNC: 70 U/L — HIGH (ref 10–40)
BASOPHILS # BLD AUTO: 0.02 K/UL — SIGNIFICANT CHANGE UP (ref 0–0.2)
BASOPHILS NFR BLD AUTO: 0.2 % — SIGNIFICANT CHANGE UP (ref 0–2)
BILIRUB SERPL-MCNC: 0.6 MG/DL — SIGNIFICANT CHANGE UP (ref 0.2–1.2)
BUN SERPL-MCNC: 20 MG/DL — SIGNIFICANT CHANGE UP (ref 7–23)
CALCIUM SERPL-MCNC: 9.1 MG/DL — SIGNIFICANT CHANGE UP (ref 8.4–10.5)
CHLORIDE SERPL-SCNC: 104 MMOL/L — SIGNIFICANT CHANGE UP (ref 96–108)
CO2 SERPL-SCNC: 22 MMOL/L — SIGNIFICANT CHANGE UP (ref 22–31)
CREAT SERPL-MCNC: 0.96 MG/DL — SIGNIFICANT CHANGE UP (ref 0.5–1.3)
EOSINOPHIL # BLD AUTO: 0.03 K/UL — SIGNIFICANT CHANGE UP (ref 0–0.5)
EOSINOPHIL NFR BLD AUTO: 0.4 % — SIGNIFICANT CHANGE UP (ref 0–6)
GLUCOSE SERPL-MCNC: 183 MG/DL — HIGH (ref 70–99)
HCT VFR BLD CALC: 33.9 % — LOW (ref 34.5–45)
HGB BLD-MCNC: 10.6 G/DL — LOW (ref 11.5–15.5)
IMM GRANULOCYTES NFR BLD AUTO: 0.6 % — SIGNIFICANT CHANGE UP (ref 0–1.5)
LYMPHOCYTES # BLD AUTO: 0.87 K/UL — LOW (ref 1–3.3)
LYMPHOCYTES # BLD AUTO: 10.8 % — LOW (ref 13–44)
MCHC RBC-ENTMCNC: 28.6 PG — SIGNIFICANT CHANGE UP (ref 27–34)
MCHC RBC-ENTMCNC: 31.3 GM/DL — LOW (ref 32–36)
MCV RBC AUTO: 91.6 FL — SIGNIFICANT CHANGE UP (ref 80–100)
MONOCYTES # BLD AUTO: 0.45 K/UL — SIGNIFICANT CHANGE UP (ref 0–0.9)
MONOCYTES NFR BLD AUTO: 5.6 % — SIGNIFICANT CHANGE UP (ref 2–14)
NEUTROPHILS # BLD AUTO: 6.63 K/UL — SIGNIFICANT CHANGE UP (ref 1.8–7.4)
NEUTROPHILS NFR BLD AUTO: 82.4 % — HIGH (ref 43–77)
PLATELET # BLD AUTO: 340 K/UL — SIGNIFICANT CHANGE UP (ref 150–400)
POTASSIUM SERPL-MCNC: 4.5 MMOL/L — SIGNIFICANT CHANGE UP (ref 3.5–5.3)
POTASSIUM SERPL-SCNC: 4.5 MMOL/L — SIGNIFICANT CHANGE UP (ref 3.5–5.3)
PROT SERPL-MCNC: 6.1 G/DL — SIGNIFICANT CHANGE UP (ref 6–8.3)
RBC # BLD: 3.7 M/UL — LOW (ref 3.8–5.2)
RBC # FLD: 15.5 % — HIGH (ref 10.3–14.5)
SODIUM SERPL-SCNC: 141 MMOL/L — SIGNIFICANT CHANGE UP (ref 135–145)
WBC # BLD: 8.05 K/UL — SIGNIFICANT CHANGE UP (ref 3.8–10.5)
WBC # FLD AUTO: 8.05 K/UL — SIGNIFICANT CHANGE UP (ref 3.8–10.5)

## 2018-04-18 RX ORDER — POLYETHYLENE GLYCOL 3350 17 G/17G
17 POWDER, FOR SOLUTION ORAL ONCE
Qty: 0 | Refills: 0 | Status: DISCONTINUED | OUTPATIENT
Start: 2018-04-18 | End: 2018-04-20

## 2018-04-18 RX ADMIN — Medication 1: at 08:56

## 2018-04-18 RX ADMIN — POLYETHYLENE GLYCOL 3350 17 GRAM(S): 17 POWDER, FOR SOLUTION ORAL at 11:13

## 2018-04-18 RX ADMIN — Medication 4 MILLIGRAM(S): at 21:47

## 2018-04-18 RX ADMIN — Medication 4 MILLIGRAM(S): at 05:01

## 2018-04-18 RX ADMIN — PIPERACILLIN AND TAZOBACTAM 25 GRAM(S): 4; .5 INJECTION, POWDER, LYOPHILIZED, FOR SOLUTION INTRAVENOUS at 05:01

## 2018-04-18 RX ADMIN — Medication 3 MILLILITER(S): at 11:21

## 2018-04-18 RX ADMIN — Medication 4 MILLIGRAM(S): at 15:12

## 2018-04-18 RX ADMIN — ENOXAPARIN SODIUM 40 MILLIGRAM(S): 100 INJECTION SUBCUTANEOUS at 05:01

## 2018-04-18 RX ADMIN — Medication 4 MILLIGRAM(S): at 09:18

## 2018-04-18 RX ADMIN — Medication 1: at 13:17

## 2018-04-18 RX ADMIN — Medication 100 MILLIGRAM(S): at 21:47

## 2018-04-18 RX ADMIN — PIPERACILLIN AND TAZOBACTAM 25 GRAM(S): 4; .5 INJECTION, POWDER, LYOPHILIZED, FOR SOLUTION INTRAVENOUS at 21:47

## 2018-04-18 RX ADMIN — SODIUM CHLORIDE 100 MILLILITER(S): 9 INJECTION INTRAMUSCULAR; INTRAVENOUS; SUBCUTANEOUS at 05:01

## 2018-04-18 RX ADMIN — Medication 3 MILLILITER(S): at 17:52

## 2018-04-18 RX ADMIN — Medication 100 MILLIGRAM(S): at 05:02

## 2018-04-18 RX ADMIN — Medication 40 MILLIGRAM(S): at 05:01

## 2018-04-18 RX ADMIN — PIPERACILLIN AND TAZOBACTAM 25 GRAM(S): 4; .5 INJECTION, POWDER, LYOPHILIZED, FOR SOLUTION INTRAVENOUS at 14:57

## 2018-04-18 RX ADMIN — Medication 3 MILLILITER(S): at 05:02

## 2018-04-18 RX ADMIN — LISINOPRIL 40 MILLIGRAM(S): 2.5 TABLET ORAL at 05:01

## 2018-04-18 RX ADMIN — Medication 1: at 17:34

## 2018-04-18 RX ADMIN — Medication 100 MILLIGRAM(S): at 14:02

## 2018-04-18 NOTE — PROGRESS NOTE ADULT - SUBJECTIVE AND OBJECTIVE BOX
Chief Complaint:  Patient is a 72y old  Female who presents with a chief complaint of abdominal pain, blurry vision, SOB (14 Apr 2018 23:00)      Interval Events:   - patient continues to feel better since paracentesis  - she is tolerating liquid diet  - no fevers, nausea or vomiting    Allergies:  No Known Allergies      Hospital Medications:  acetaminophen   Tablet. 650 milliGRAM(s) Oral every 6 hours PRN  ALBUTerol/ipratropium for Nebulization 3 milliLiter(s) Nebulizer every 6 hours  dexamethasone     Tablet 4 milliGRAM(s) Oral every 6 hours  docusate sodium 100 milliGRAM(s) Oral three times a day  enoxaparin Injectable 40 milliGRAM(s) SubCutaneous every 24 hours  furosemide    Tablet 40 milliGRAM(s) Oral daily  glucagon  Injectable 1 milliGRAM(s) IntraMuscular once PRN  influenza   Vaccine 0.5 milliLiter(s) IntraMuscular once  insulin lispro (HumaLOG) corrective regimen sliding scale   SubCutaneous three times a day before meals  insulin lispro (HumaLOG) corrective regimen sliding scale   SubCutaneous at bedtime  lisinopril 40 milliGRAM(s) Oral daily  morphine  - Injectable 2 milliGRAM(s) IV Push every 4 hours PRN  morphine  - Injectable 4 milliGRAM(s) IV Push every 4 hours PRN  ondansetron Injectable 4 milliGRAM(s) IV Push every 6 hours PRN  piperacillin/tazobactam IVPB. 3.375 Gram(s) IV Intermittent every 8 hours  polyethylene glycol 3350 17 Gram(s) Oral daily  senna 2 Tablet(s) Oral at bedtime PRN  sodium chloride 0.9%. 1000 milliLiter(s) IV Continuous <Continuous>      PMHX/PSHX:  Malignant neoplasm of ovary, unspecified laterality  S/P DENNY-BSO      Family history:      ROS:     General:  No wt loss, fevers, chills, night sweats, fatigue   Eyes:  Good vision, no reported pain  ENT:  No sore throat, pain, runny nose  CV:  No chest pain, palpitations  Resp:  No cough, wheezing, SOB  GI:  See HPI  :  No pain, bleeding, incontinence, nocturia  Muscle:  No pain, weakness  Neuro:  No weakness, tingling, memory problems  Psych:  No fatigue, insomnia, mood problems, depression  Endocrine:  No cold/heat intolerance  Heme:  No petechiae, ecchymosis, easy bruising  Skin:  No rash, edema      PHYSICAL EXAM:   Vital Signs:  Vital Signs Last 24 Hrs  T(C): 36.6 (18 Apr 2018 07:13), Max: 36.7 (18 Apr 2018 00:03)  T(F): 97.8 (18 Apr 2018 07:13), Max: 98.1 (18 Apr 2018 00:03)  HR: 93 (18 Apr 2018 07:13) (92 - 104)  BP: 135/81 (18 Apr 2018 07:13) (128/74 - 162/91)  RR: 18 (18 Apr 2018 07:13) (18 - 20)  SpO2: 97% (18 Apr 2018 07:13) (91% - 97%)  Daily     Daily     GENERAL:  NAD  HEENT:  sclera anicteric  CHEST:  no respiratory distress, CTAB  HEART:  RRR, no MRG, no edema  ABDOMEN:  Soft, non-tender, non-distended, no masses , no hepato-splenomegaly,   EXTREMITIES:  no cyanosis, no edema  SKIN:  No rash  NEURO:  Alert, oriented      LABS:                        9.8    7.31  )-----------( 312      ( 17 Apr 2018 07:41 )             31.0     04-18    141  |  104  |  20  ----------------------------<  183<H>  4.5   |  22  |  0.96    Ca    9.1      18 Apr 2018 07:47    TPro  6.1  /  Alb  2.5<L>  /  TBili  0.6  /  DBili  x   /  AST  70<H>  /  ALT  72<H>  /  AlkPhos  x   04-18    LIVER FUNCTIONS - ( 18 Apr 2018 07:47 )  Alb: 2.5 g/dL / Pro: 6.1 g/dL / ALK PHOS: x     / ALT: 72 U/L / AST: 70 U/L / GGT: x                 Imaging:

## 2018-04-18 NOTE — PROGRESS NOTE ADULT - ASSESSMENT
73 yo woman with metastatic ovarian Ca with peritoneal carcinomatosis and ascites (presumably malignant ascites) with recent ERCP and stent placement for biliary obstruction presently admitted with abd pain that is likely secondary to her metastatic ovarian cancer and peritoneal carcinomatosis. She feels better in terms of her abd distention, bloating and pain since undergoing paracentesis on 4/16. Biliary stent appears patent based on pneumobilia and Tbili < 1.0. She had E coli bacteremia of uncertain source - possibly biliary (gallbladder) vs  source. She is on abx and doing well.     Plan:  - please obtain ERCP records from OSH  - continue abx for E coli bacteremia  - no plan for endoscopic drainage procedure at this time

## 2018-04-18 NOTE — PROGRESS NOTE ADULT - ATTENDING COMMENTS
continue lovenox    possible discharge in next 1-2 days  PT eval    Discussed with Dr Fonseca.    Ulises Pablo,   Internal Medicine

## 2018-04-18 NOTE — PROGRESS NOTE ADULT - SUBJECTIVE AND OBJECTIVE BOX
seen and examined.  still no BM. overall she states she is feeling a bit better, no fever or chills.  no abd pain.  eating fine.  no SOB.  states she does use oxygen intermittently at home.     MEDICATIONS  (STANDING):  ALBUTerol/ipratropium for Nebulization 3 milliLiter(s) Nebulizer every 6 hours  dexamethasone     Tablet 4 milliGRAM(s) Oral every 6 hours  dextrose 5%. 1000 milliLiter(s) (50 mL/Hr) IV Continuous <Continuous>  dextrose 50% Injectable 12.5 Gram(s) IV Push once  dextrose 50% Injectable 25 Gram(s) IV Push once  dextrose 50% Injectable 25 Gram(s) IV Push once  docusate sodium 100 milliGRAM(s) Oral three times a day  enoxaparin Injectable 40 milliGRAM(s) SubCutaneous every 24 hours  furosemide    Tablet 40 milliGRAM(s) Oral daily  influenza   Vaccine 0.5 milliLiter(s) IntraMuscular once  insulin lispro (HumaLOG) corrective regimen sliding scale   SubCutaneous three times a day before meals  insulin lispro (HumaLOG) corrective regimen sliding scale   SubCutaneous at bedtime  lisinopril 40 milliGRAM(s) Oral daily  piperacillin/tazobactam IVPB. 3.375 Gram(s) IV Intermittent every 8 hours  polyethylene glycol 3350 17 Gram(s) Oral daily  sodium chloride 0.9%. 1000 milliLiter(s) (100 mL/Hr) IV Continuous <Continuous>    MEDICATIONS  (PRN):  acetaminophen   Tablet. 650 milliGRAM(s) Oral every 6 hours PRN Mild Pain (1 - 3)  dextrose Gel 1 Dose(s) Oral once PRN Blood Glucose LESS THAN 70 milliGRAM(s)/deciliter  glucagon  Injectable 1 milliGRAM(s) IntraMuscular once PRN Glucose LESS THAN 70 milligrams/deciliter  morphine  - Injectable 2 milliGRAM(s) IV Push every 4 hours PRN Moderate Pain (4 - 6)  morphine  - Injectable 4 milliGRAM(s) IV Push every 4 hours PRN Severe Pain (7 - 10)  ondansetron Injectable 4 milliGRAM(s) IV Push every 6 hours PRN Nausea  senna 2 Tablet(s) Oral at bedtime PRN Constipation      Allergies    No Known Allergies    Intolerances        Vital Signs Last 24 Hrs  T(C): 36.6 (18 Apr 2018 07:13), Max: 36.7 (18 Apr 2018 00:03)  T(F): 97.8 (18 Apr 2018 07:13), Max: 98.1 (18 Apr 2018 00:03)  HR: 93 (18 Apr 2018 07:13) (92 - 104)  BP: 135/81 (18 Apr 2018 07:13) (128/74 - 162/91)  BP(mean): --  RR: 18 (18 Apr 2018 07:13) (18 - 20)  SpO2: 97% (18 Apr 2018 07:13) (91% - 97%)    PHYSICAL EXAM:    aaox3, nad  pleasant  rhonchi at bases, otherwise good air entry  abd firm, distended though nontender  rrr s1 s2 no murmurs  1+ edema in both legs to mid shin  no focal deficits        LABS:                        10.6   8.05  )-----------( 340      ( 18 Apr 2018 09:16 )             33.9     04-18    141  |  104  |  20  ----------------------------<  183<H>  4.5   |  22  |  0.96    Ca    9.1      18 Apr 2018 07:47    TPro  6.1  /  Alb  2.5<L>  /  TBili  0.6  /  DBili  x   /  AST  70<H>  /  ALT  72<H>  /  AlkPhos  1602<H>  04-18          RADIOLOGY & ADDITIONAL STUDIES:

## 2018-04-19 ENCOUNTER — TRANSCRIPTION ENCOUNTER (OUTPATIENT)
Age: 73
End: 2018-04-19

## 2018-04-19 LAB
ANION GAP SERPL CALC-SCNC: 16 MMOL/L — SIGNIFICANT CHANGE UP (ref 5–17)
BUN SERPL-MCNC: 18 MG/DL — SIGNIFICANT CHANGE UP (ref 7–23)
CALCIUM SERPL-MCNC: 8.9 MG/DL — SIGNIFICANT CHANGE UP (ref 8.4–10.5)
CHLORIDE SERPL-SCNC: 101 MMOL/L — SIGNIFICANT CHANGE UP (ref 96–108)
CO2 SERPL-SCNC: 22 MMOL/L — SIGNIFICANT CHANGE UP (ref 22–31)
CREAT SERPL-MCNC: 0.91 MG/DL — SIGNIFICANT CHANGE UP (ref 0.5–1.3)
GLUCOSE SERPL-MCNC: 169 MG/DL — HIGH (ref 70–99)
HCT VFR BLD CALC: 37.1 % — SIGNIFICANT CHANGE UP (ref 34.5–45)
HGB BLD-MCNC: 11.5 G/DL — SIGNIFICANT CHANGE UP (ref 11.5–15.5)
MCHC RBC-ENTMCNC: 29 PG — SIGNIFICANT CHANGE UP (ref 27–34)
MCHC RBC-ENTMCNC: 31 GM/DL — LOW (ref 32–36)
MCV RBC AUTO: 93.7 FL — SIGNIFICANT CHANGE UP (ref 80–100)
PLATELET # BLD AUTO: 374 K/UL — SIGNIFICANT CHANGE UP (ref 150–400)
POTASSIUM SERPL-MCNC: 4.2 MMOL/L — SIGNIFICANT CHANGE UP (ref 3.5–5.3)
POTASSIUM SERPL-SCNC: 4.2 MMOL/L — SIGNIFICANT CHANGE UP (ref 3.5–5.3)
RBC # BLD: 3.96 M/UL — SIGNIFICANT CHANGE UP (ref 3.8–5.2)
RBC # FLD: 15 % — HIGH (ref 10.3–14.5)
SODIUM SERPL-SCNC: 139 MMOL/L — SIGNIFICANT CHANGE UP (ref 135–145)
WBC # BLD: 11.02 K/UL — HIGH (ref 3.8–10.5)
WBC # FLD AUTO: 11.02 K/UL — HIGH (ref 3.8–10.5)

## 2018-04-19 PROCEDURE — 93970 EXTREMITY STUDY: CPT | Mod: 26

## 2018-04-19 RX ORDER — DEXAMETHASONE 0.5 MG/5ML
1 ELIXIR ORAL EVERY 12 HOURS
Qty: 0 | Refills: 0 | Status: CANCELLED | OUTPATIENT
Start: 2018-04-23 | End: 2018-04-20

## 2018-04-19 RX ORDER — DEXAMETHASONE 0.5 MG/5ML
2 ELIXIR ORAL EVERY 12 HOURS
Qty: 0 | Refills: 0 | Status: DISCONTINUED | OUTPATIENT
Start: 2018-04-21 | End: 2018-04-20

## 2018-04-19 RX ORDER — DEXAMETHASONE 0.5 MG/5ML
4 ELIXIR ORAL EVERY 12 HOURS
Qty: 0 | Refills: 0 | Status: DISCONTINUED | OUTPATIENT
Start: 2018-04-19 | End: 2018-04-20

## 2018-04-19 RX ORDER — ENOXAPARIN SODIUM 100 MG/ML
90 INJECTION SUBCUTANEOUS
Qty: 0 | Refills: 0 | Status: DISCONTINUED | OUTPATIENT
Start: 2018-04-19 | End: 2018-04-19

## 2018-04-19 RX ORDER — CIPROFLOXACIN LACTATE 400MG/40ML
500 VIAL (ML) INTRAVENOUS EVERY 12 HOURS
Qty: 0 | Refills: 0 | Status: DISCONTINUED | OUTPATIENT
Start: 2018-04-19 | End: 2018-04-20

## 2018-04-19 RX ORDER — ENOXAPARIN SODIUM 100 MG/ML
90 INJECTION SUBCUTANEOUS
Qty: 0 | Refills: 0 | Status: DISCONTINUED | OUTPATIENT
Start: 2018-04-19 | End: 2018-04-20

## 2018-04-19 RX ADMIN — Medication 1: at 08:46

## 2018-04-19 RX ADMIN — Medication 4 MILLIGRAM(S): at 05:54

## 2018-04-19 RX ADMIN — Medication 650 MILLIGRAM(S): at 05:54

## 2018-04-19 RX ADMIN — PIPERACILLIN AND TAZOBACTAM 25 GRAM(S): 4; .5 INJECTION, POWDER, LYOPHILIZED, FOR SOLUTION INTRAVENOUS at 14:30

## 2018-04-19 RX ADMIN — Medication 100 MILLIGRAM(S): at 05:53

## 2018-04-19 RX ADMIN — SODIUM CHLORIDE 100 MILLILITER(S): 9 INJECTION INTRAMUSCULAR; INTRAVENOUS; SUBCUTANEOUS at 23:44

## 2018-04-19 RX ADMIN — Medication 650 MILLIGRAM(S): at 06:54

## 2018-04-19 RX ADMIN — Medication 4 MILLIGRAM(S): at 17:20

## 2018-04-19 RX ADMIN — Medication 100 MILLIGRAM(S): at 22:17

## 2018-04-19 RX ADMIN — SODIUM CHLORIDE 100 MILLILITER(S): 9 INJECTION INTRAMUSCULAR; INTRAVENOUS; SUBCUTANEOUS at 20:01

## 2018-04-19 RX ADMIN — Medication 1: at 17:19

## 2018-04-19 RX ADMIN — Medication 1: at 14:31

## 2018-04-19 RX ADMIN — Medication 4 MILLIGRAM(S): at 10:18

## 2018-04-19 RX ADMIN — Medication 650 MILLIGRAM(S): at 15:30

## 2018-04-19 RX ADMIN — Medication 100 MILLIGRAM(S): at 14:31

## 2018-04-19 RX ADMIN — ENOXAPARIN SODIUM 40 MILLIGRAM(S): 100 INJECTION SUBCUTANEOUS at 05:54

## 2018-04-19 RX ADMIN — PIPERACILLIN AND TAZOBACTAM 25 GRAM(S): 4; .5 INJECTION, POWDER, LYOPHILIZED, FOR SOLUTION INTRAVENOUS at 05:59

## 2018-04-19 RX ADMIN — Medication 500 MILLIGRAM(S): at 17:23

## 2018-04-19 RX ADMIN — Medication 40 MILLIGRAM(S): at 05:53

## 2018-04-19 RX ADMIN — Medication 650 MILLIGRAM(S): at 14:39

## 2018-04-19 RX ADMIN — Medication 3 MILLILITER(S): at 05:54

## 2018-04-19 RX ADMIN — LISINOPRIL 40 MILLIGRAM(S): 2.5 TABLET ORAL at 05:53

## 2018-04-19 RX ADMIN — ENOXAPARIN SODIUM 90 MILLIGRAM(S): 100 INJECTION SUBCUTANEOUS at 17:22

## 2018-04-19 NOTE — PROGRESS NOTE ADULT - PROBLEM SELECTOR PLAN 5
continue nebs  stable at this time.
above goal, due to steroids,  taper steroids  would hold metformin upon discharge considering hepatic disease, and restart as outpatient.
continue nebs  stable at this time.
continue nebs, overall doing okay from this end
monitor finger sticks Q6 hours   on decadron  avoid hypoglycemia

## 2018-04-19 NOTE — PROGRESS NOTE ADULT - PROBLEM SELECTOR PLAN 3
Rad onc recs appreciated.  to have focal radiation arranged as outpatient.   neurosurgery to advised on decadron taper
steroid taper upon discharge per neurosurgery  rad onc follow up outpatient for focal radiation to brain met
IR consult tomorrow for possible paracentesis, for cytology?  possible SBP versus GB etiology to gram negative bacteremia?  suspect underlygin peritoneal carcinomatosis,.
peritoneal carcinomatosis.  abd symptoms improved with paracentesis.  await cytology.  Has lower extremity edema on exam - check dopplers, and start lasix 40mg PO daily if negative.  add miralax for constipation.
s/p paracentesis, await cytology, suspect peritoneal carcinomatosis.  symptoms improved post para.

## 2018-04-19 NOTE — PROGRESS NOTE ADULT - PROBLEM SELECTOR PLAN 1
Dr Fonseca following  amenable to palliative intent chemo, to be determined with family and Dr Fonseca.  outpatient decision to be made.
follow up outpatient with Dr Fonseca, to decide on role of chemo with oncology and family.
GI consult, Dr Crain  IR consult for possible IR percutaneous cholecystomy  trend LFTs and Amylase/lipase  NPO until GI eval  possible source of gram negative bacteremia  continue Zosyn for now  ID consult, Dr Watts
HIDA with patent biliary stent, symptoms largely improved with paracentesis and unlikely SBP based on cell count.   Await advanced endoscopy recs regarding possible intervention though seems less likely.
GI consult, discussed with Jaron who recommended advanced GI eval for possible ERCP.  Discussed with advanced team, to review imaging and give recs tomorrow  NPO past midnight  trend LFTs and Amylase/lipase  HIDA done, GI to determine need for ERCP

## 2018-04-19 NOTE — DISCHARGE NOTE ADULT - HOSPITAL COURSE
This is a 72 year old female with metastatic ovarian cancer (Dx 2008, s/p DENNY/BSO, chemo with recurrence 2017 and repeat chemo, last 12/2017), COPD, presenting with multiple complaints, including abdominal pain, shortness of breath and blurry vision. patient was found with new metastatic lesion-- radiation oncology consulted --outpatient follow scheduled. Patient noted with E-coli bacteremia started on Zosyn ID consulted now on Cipro BID unit 4/27.     Cholecystitis, recent biliary stent-- Surgery/GI consulted-- outpatient follow-up. 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  Small L temporal Lobe Brain Met    -- Pt needs outpatient f/u  with radiation oncology for management of brain met w SRS  -- Decrease Dexamethasone to 4mg PO Q 8 hrs. GI Prophylaxis w PPI. Can taper after SRS      4. [place] Refractory Metastatic Ovarian CA w Peritoneal Carcinomatosis, Adrenal Mets , likely malignant ascites and ? new brain met. Last Chemo was Carboplatin + Doxil on 9/19/17. Refused Maintenance Olaparib. Recurrence noted on CT on 3/6/18.    -- pt previously declined further chemo but having second thoughts. If she recovers from acute illness would be candidate for palliative chemo with Gemcitabine +/- Bevacizumab  -- s/p Paracentesis. Ascitic fluid pos for malignant cells      No obejection to D/C Home. F/U as outpt    Jose Fonseca MD  cell # [phone] This is a 72 year old female with metastatic ovarian cancer (Dx 2008, s/p DENNY/BSO, chemo with recurrence 2017 and repeat chemo, last 12/2017), COPD, presenting with multiple complaints, including abdominal pain, shortness of breath and blurry vision. patient was found with new Small L temporal Lobe Brain Met- radiation oncology consulted --outpatient follow scheduled. Patient noted with E-coli bacteremia started on Zosyn ID consulted now on Cipro BID unit 4/27.     Cholecystitis, recent biliary stent-- Surgery/GI consulted-- outpatient follow-up. Patient also noted with Ascites -- s/p Paracentesis. Ascitic fluid pos for malignant cells. Patient noted found with ---Bilat Acute Below the Knee DVT started Lovenox started -- refuses to self inject at home, switch to Apixaban no contraindication to AC sec to brain met as lesion is quite small and not assoc w hemorrhagic component.

## 2018-04-19 NOTE — PROGRESS NOTE ADULT - PROBLEM SELECTOR PROBLEM 5
Diabetes mellitus
Diabetes mellitus
COPD (chronic obstructive pulmonary disease)

## 2018-04-19 NOTE — PROGRESS NOTE ADULT - PROBLEM SELECTOR PLAN 8
gram negative bactermia  ID consult appreciated  continue zosyn, await sensitivities, repeat cultures pending  not a good surgical candidate, surgery following
discussed with advanced endoscopy attending  ERCP records in physical chart from [facility], seems to have been done for obstructive jaundice  doubtful for need of ERCP at this time.
discussed with advanced endoscopy attending  ERCP records in physical chart from [facility], seems to have been done for obstructive jaundice  doubtful for need of ERCP at this time.
gram negative bactermia  ID consult  continue zosyn  resend blood cultures  possible GB etiology??  IR consult for drainage  not a good surgical candidate, surgery following
gram negative bactermia  ID consult appreciated  continue zosyn, await repeat blood cultures  sensitivities with pan sensitive e coli.  await ID recs for possible change to PO? and duration of Abx.

## 2018-04-19 NOTE — PHYSICAL THERAPY INITIAL EVALUATION ADULT - PERTINENT HX OF CURRENT PROBLEM, REHAB EVAL
Pt is a 73 y/o female admitted to [facility] on 4/14/18 hx of metastatic ovarian cancer (Dx 2008, s/p DENNY/BSO, chemo with recurrence 2017 and repeat chemo, last 12/2017), COPD, presenting with multiple complaints, including abdominal pain, shortness of breath and blurry vision. Patient was recently admitted to [facility] for abdominal pain and jaundice. She was found at the time to be cholestatic, had biliary stent placed, was discharged to [facility] and then to her daughter's home 4/12.

## 2018-04-19 NOTE — DISCHARGE NOTE ADULT - SECONDARY DIAGNOSIS.
Ascites Diabetes mellitus Essential hypertension Malignant neoplasm of ovary, unspecified laterality COPD (chronic obstructive pulmonary disease) Brain lesion

## 2018-04-19 NOTE — PROGRESS NOTE ADULT - PROBLEM SELECTOR PROBLEM 4
COPD (chronic obstructive pulmonary disease)
COPD (chronic obstructive pulmonary disease)
Brain lesion

## 2018-04-19 NOTE — PROGRESS NOTE ADULT - PROBLEM SELECTOR PLAN 2
peritoneal carcinomatosis.  abd symptoms improved with paracentesis.  await cytology.  await lower extremity dopplers, rule out DVT, can decrease to lasix 20mg qd.   await BM, encouraged ambulation with PT.  continue miralax and stool softners.
peritoneal carcinomatosis.  abd symptoms improved with paracentesis.  await cytology.  check lower extremity dopplers, rule out DVT, continue lasix 40mg qd.   await BM, encouraged ambulation with PT.  continue miralax and stool softners.
Dr Lassiter following  amenable to palliative intent chemo, to be determined with family and Dr Fonseca.
Dr Lassiter following  suspect metastatic ovarian cancer as etiology of many of her symptoms.  GOC discussion ongoing with family and patient, but for now full code and continue treatment of medical conditions.
Dr Lassiter following  suspect metastatic ovarian cancer as etiology of many of her symptoms.  GOC discussion ongoing with family and patient, but for now full code and continue treatment of medical conditions.

## 2018-04-19 NOTE — PROGRESS NOTE ADULT - PROBLEM SELECTOR PLAN 6
monitor finger sticks Q6 hours   on decadron  increase insulin dosing as indicated by fingersticks  avoid hypoglycemia, NPO tomorrow for GI eval.
continue home meds, at goal at this time.
continue home meds, at goal at this time.
monitor finger sticks Q6 hours   \on decadron  increase insulin dosing as indicated by fingersticks  avoid hypoglycemia, NPO at this time.
monitor finger sticks Q6 hours   on decadron  avoid hypoglycemia

## 2018-04-19 NOTE — PROGRESS NOTE ADULT - PROBLEM SELECTOR PLAN 7
continue home meds, at goal at this time.
gram negative bactermia  ID consult appreciated  consider discharge on cipro BID, ID to indicate duration of treatment
gram negative bactermia  ID consult appreciated  continue zosyn, repeat cultures negative to date, possible change to PO meds tomorrow for discharge if ID okay with plan, duration of Abx? 7-14 days?  suspect possible biliary source? less likely .

## 2018-04-19 NOTE — PHYSICAL THERAPY INITIAL EVALUATION ADULT - ADDITIONAL COMMENTS
Pt lives alone in a private house with one flight of steps inside. Pt reports that upon discharge, pt will stay at her dtr's house which has no steps to negotiate. Pt amb with a RW.

## 2018-04-19 NOTE — PROGRESS NOTE ADULT - PROBLEM SELECTOR PLAN 4
continue nebs  stable at this time.
continue nebs  stable at this time.
Rad onc consult in AM  suspect metastatic lesion  MRI brain today  neurosurgery following  continue decadron
Rad onc recs appreciated.  to have focal radiation arranged as outpatient.   continue decadron for now per neurosurgery
Rad onc recs appreciated.  to have focal radiation arranged as outpatient.   continue decadron for now per neurosurgery

## 2018-04-19 NOTE — CHART NOTE - NSCHARTNOTEFT_GEN_A_CORE
LE dopplers positive for bilateral acute, below the knee, peroneal DVT.  Attending informed.  [facility] recommended treatment with Lovenox.      Spoke with NeuroSurgery resident regarding tapering of steroids - recommends 4mG every 12 hours x 2 days, then 2mG every 12 hours x 2 days, then 1mG every 12 hours x 2 days, then Stop.  d/w Attending.    Regarding antibiotics, patient transition to Cipro 500mG BID to complete antibiotic course of 14 days, as recommended by Dr. Montano.

## 2018-04-19 NOTE — PROGRESS NOTE ADULT - PROBLEM SELECTOR PROBLEM 1
Malignant neoplasm of ovary, unspecified laterality
Malignant neoplasm of ovary, unspecified laterality
Gallstone pancreatitis

## 2018-04-19 NOTE — PROGRESS NOTE ADULT - SUBJECTIVE AND OBJECTIVE BOX
Follow Up:      Interval History/ROS:  not in room when I rounded    Allergies  No Known Allergies    ANTIMICROBIALS:  ciprofloxacin     Tablet 500 every 12 hours    OTHER MEDS:  MEDICATIONS  (STANDING):  acetaminophen   Tablet. 650 every 6 hours PRN  ALBUTerol/ipratropium for Nebulization 3 every 6 hours  dexamethasone     Tablet 4 every 12 hours  dextrose 50% Injectable 12.5 once  dextrose 50% Injectable 25 once  dextrose 50% Injectable 25 once  dextrose Gel 1 once PRN  docusate sodium 100 three times a day  enoxaparin Injectable 90 two times a day  furosemide    Tablet 40 daily  glucagon  Injectable 1 once PRN  influenza   Vaccine 0.5 once  insulin lispro (HumaLOG) corrective regimen sliding scale  three times a day before meals  insulin lispro (HumaLOG) corrective regimen sliding scale  at bedtime  lisinopril 40 daily  morphine  - Injectable 2 every 4 hours PRN  morphine  - Injectable 4 every 4 hours PRN  ondansetron Injectable 4 every 6 hours PRN  polyethylene glycol 3350 17 once  senna 2 at bedtime PRN      Vital Signs Last 24 Hrs  T(C): 36.7 (19 Apr 2018 16:29), Max: 36.9 (19 Apr 2018 07:26)  T(F): 98.1 (19 Apr 2018 16:29), Max: 98.5 (19 Apr 2018 07:26)  HR: 100 (19 Apr 2018 16:29) (91 - 100)  BP: 131/82 (19 Apr 2018 16:29) (131/82 - 164/96)  BP(mean): --  RR: 18 (19 Apr 2018 16:29) (18 - 20)  SpO2: 93% (19 Apr 2018 16:29) (91% - 93%)    PHYSICAL EXAM:  not examined                        11.5   11.02 )-----------( 374      ( 19 Apr 2018 07:30 )             37.1       04-19    139  |  101  |  18  ----------------------------<  169<H>  4.2   |  22  |  0.91    Ca    8.9      19 Apr 2018 06:39    TPro  6.1  /  Alb  2.5<L>  /  TBili  0.6  /  DBili  x   /  AST  70<H>  /  ALT  72<H>  /  AlkPhos  1602<H>  04-18      MICROBIOLOGY:  .Body Fluid Peritoneal Fluid  04-16-18   No growth to date.  --    polymorphonuclear leukocytes seen  No organisms seen  by cytocentrifuge      .Blood Blood  04-15-18   No growth to date.  --  --      .Urine Clean Catch (Midstream)  04-14-18   10,000 - 49,000 CFU/mL Escherichia coli  --  Escherichia coli    .Blood Blood  04-14-18   Growth in aerobic and anaerobic bottles: Escherichia coli    .Body Fluid Peritoneal Fluid      RADIOLOGY:  < from: [facility] Duplex Lower Ext Vein Scan, Bilat (04.19.18 @ 13:45) >  Bilateral acute, below the knee, peroneal DVT.     < end of copied text >      Malik Montano MD; Division of Infectious Disease; Pager: [phone]; nights and weekends: [phone]

## 2018-04-19 NOTE — PROGRESS NOTE ADULT - ASSESSMENT
72F with metastatic ovarian cancer s/p DENNY/BSO last chemotherapy 12/2017, was admitted recently to [facility] for biliary obstructive s/p stent placement. Now admitted 4/14/18 with E. coli bacteremia, probably biliary source but recently treated UTI  Now on Lovenox for DVT    Continue   Zosyn 4/14-->19  Cipro 500 mg po every 12 hours ---> 4/27    discussed with primary team    I will be out until 4/23 - Please call ID service if needed

## 2018-04-19 NOTE — PHYSICAL THERAPY INITIAL EVALUATION ADULT - PRECAUTIONS/LIMITATIONS, REHAB EVAL
She notes that her jaundice improved after stent placement, but her abdominal pain has persisted. She describes the abdominal pain as intermittent 7/10 diffuse pain, worse in the supraumbilical/L flank regions, worse with deep breath and cough, non-radiating, not related to food. Notes chronic constipation, denies diarrhea, notes that prior to stent placement she had some nausea and vomiting, but that has improved since. She also notes generalized fatigue, poor appetite, and shortness of breath. CT head, chest, abdomen/pelvis with multiple findings including peripancreatic changes with low densitry 3x2 cm lesion c/w metastatis vs. pancreatitis, distended GB with stone at the neck of the GB with pneumobilia, bilateral adrenal masses suspicious for metastatic disease, moderate ascites c/f peritoneal carcinomatosis, moderate right pleural effusion with associated compressive atelectasis.  MR head: Enhancing lesion in the left parietal bone with permeative appearance. Enhancing left temporal lobe lesion reidentified. Pt is s/p paracentesis on 4/16.

## 2018-04-19 NOTE — DISCHARGE NOTE ADULT - MEDICATION SUMMARY - MEDICATIONS TO TAKE
I will START or STAY ON the medications listed below when I get home from the hospital:    dexamethasone 2 mg oral tablet  -- 2 tab(s) by mouth once a day x 2 days  1 tab(s) by mouth once a day x 2 days  then 1mg tab once a day x 2 days   -- Indication: For Brain lesion    ramipril 10 mg oral capsule  -- 1 cap(s) by mouth once a day  -- Indication: For htn    apixaban 2.5 mg oral tablet  -- 1 tab(s) by mouth every 12 hours  -- Indication: For Dvt    glipiZIDE 5 mg oral tablet, extended release  -- 1 tab(s) by mouth once a day  -- Indication: For Diabetes mellitus    metFORMIN 500 mg oral tablet  -- 1 tab(s) by mouth 2 times a day  -- Indication: For Diabetes mellitus    Symbicort 80 mcg-4.5 mcg/inh inhalation aerosol  -- 2 puff(s) inhaled 2 times a day  -- Indication: For COPD (chronic obstructive pulmonary disease)    Ventolin 5 mg/mL (0.5%) inhalation solution  -- 0.5 milliliter(s) inhaled every 6 hours  -- Indication: For COPD (chronic obstructive pulmonary disease)    Lasix 40 mg oral tablet  -- 1 tab(s) by mouth once a day  -- Indication: For Edema     senna oral tablet  -- 2 tab(s) by mouth once a day (at bedtime), As needed, Constipation  -- Indication: For COnstipation     docusate sodium 100 mg oral capsule  -- 1 cap(s) by mouth 3 times a day  -- Indication: For COnstipation     ciprofloxacin 500 mg oral tablet  -- 1 tab(s) by mouth every 12 hours  -- Indication: For Bacteremia I will START or STAY ON the medications listed below when I get home from the hospital:    dexamethasone 2 mg oral tablet  -- 2 tab(s) by mouth once a day x 2 days  1 tab(s) by mouth once a day x 2 days  then 1mg tab once a day x 2 days   -- Indication: For Brain lesion    ramipril 10 mg oral capsule  -- 1 cap(s) by mouth once a day  -- Indication: For htn    apixaban 5 mg oral tablet  -- 1 tab(s) by mouth every 12 hours  -- Indication: For Dvt    glipiZIDE 5 mg oral tablet, extended release  -- 1 tab(s) by mouth once a day  -- Indication: For Diabetes mellitus    metFORMIN 500 mg oral tablet  -- 1 tab(s) by mouth 2 times a day  -- Indication: For Diabetes mellitus    Symbicort 80 mcg-4.5 mcg/inh inhalation aerosol  -- 2 puff(s) inhaled 2 times a day  -- Indication: For COPD (chronic obstructive pulmonary disease)    Ventolin 5 mg/mL (0.5%) inhalation solution  -- 0.5 milliliter(s) inhaled every 6 hours  -- Indication: For COPD (chronic obstructive pulmonary disease)    Lasix 40 mg oral tablet  -- 1 tab(s) by mouth once a day  -- Indication: For Edema     senna oral tablet  -- 2 tab(s) by mouth once a day (at bedtime), As needed, Constipation  -- Indication: For COnstipation     docusate sodium 100 mg oral capsule  -- 1 cap(s) by mouth 3 times a day  -- Indication: For COnstipation     ciprofloxacin 500 mg oral tablet  -- 1 tab(s) by mouth every 12 hours  -- Indication: For Bacteremia

## 2018-04-19 NOTE — PROGRESS NOTE ADULT - ATTENDING COMMENTS
continue lovenox  PT eval    plan for possible discharge to home tomorrow.    lUises Pablo,   Internal Medicine

## 2018-04-19 NOTE — PROGRESS NOTE ADULT - SUBJECTIVE AND OBJECTIVE BOX
seen and examined.  she states she is doing well, no SOB, urinating more since lasix started.  no fever or chills.  no BM yet.  would like to go home tomorrow.  less abdominal pain, but feels "gassy". no chest pain.     MEDICATIONS  (STANDING):  ALBUTerol/ipratropium for Nebulization 3 milliLiter(s) Nebulizer every 6 hours  dexamethasone     Tablet 4 milliGRAM(s) Oral every 6 hours  dextrose 5%. 1000 milliLiter(s) (50 mL/Hr) IV Continuous <Continuous>  dextrose 50% Injectable 12.5 Gram(s) IV Push once  dextrose 50% Injectable 25 Gram(s) IV Push once  dextrose 50% Injectable 25 Gram(s) IV Push once  docusate sodium 100 milliGRAM(s) Oral three times a day  enoxaparin Injectable 40 milliGRAM(s) SubCutaneous every 24 hours  furosemide    Tablet 40 milliGRAM(s) Oral daily  influenza   Vaccine 0.5 milliLiter(s) IntraMuscular once  insulin lispro (HumaLOG) corrective regimen sliding scale   SubCutaneous three times a day before meals  insulin lispro (HumaLOG) corrective regimen sliding scale   SubCutaneous at bedtime  lisinopril 40 milliGRAM(s) Oral daily  piperacillin/tazobactam IVPB. 3.375 Gram(s) IV Intermittent every 8 hours  polyethylene glycol 3350 17 Gram(s) Oral once  sodium chloride 0.9%. 1000 milliLiter(s) (100 mL/Hr) IV Continuous <Continuous>    MEDICATIONS  (PRN):  acetaminophen   Tablet. 650 milliGRAM(s) Oral every 6 hours PRN Mild Pain (1 - 3)  dextrose Gel 1 Dose(s) Oral once PRN Blood Glucose LESS THAN 70 milliGRAM(s)/deciliter  glucagon  Injectable 1 milliGRAM(s) IntraMuscular once PRN Glucose LESS THAN 70 milligrams/deciliter  morphine  - Injectable 2 milliGRAM(s) IV Push every 4 hours PRN Moderate Pain (4 - 6)  morphine  - Injectable 4 milliGRAM(s) IV Push every 4 hours PRN Severe Pain (7 - 10)  ondansetron Injectable 4 milliGRAM(s) IV Push every 6 hours PRN Nausea  senna 2 Tablet(s) Oral at bedtime PRN Constipation      Allergies    No Known Allergies    Intolerances        Vital Signs Last 24 Hrs  T(C): 36.9 (19 Apr 2018 07:26), Max: 36.9 (19 Apr 2018 07:26)  T(F): 98.5 (19 Apr 2018 07:26), Max: 98.5 (19 Apr 2018 07:26)  HR: 98 (19 Apr 2018 07:26) (80 - 98)  BP: 138/85 (19 Apr 2018 07:26) (138/85 - 168/83)  BP(mean): --  RR: 20 (19 Apr 2018 07:26) (20 - 20)  SpO2: 93% (19 Apr 2018 07:26) (91% - 94%)    PHYSICAL EXAM:    aaox3, nad  rrr s1 s2 no murmurs  clear lungs, no crackles today  abd firm, minimal distention, nontender no rebound or guarding  less edema in legs  no cyanosis  no focal deficits      LABS:                        10.6   8.05  )-----------( 340      ( 18 Apr 2018 09:16 )             33.9     04-19    139  |  101  |  18  ----------------------------<  169<H>  4.2   |  22  |  0.91    Ca    8.9      19 Apr 2018 06:39    TPro  6.1  /  Alb  2.5<L>  /  TBili  0.6  /  DBili  x   /  AST  70<H>  /  ALT  72<H>  /  AlkPhos  1602<H>  04-18          RADIOLOGY & ADDITIONAL STUDIES:  Bcx - pan sensitive e coli  repeat Bcx - negative

## 2018-04-19 NOTE — PROGRESS NOTE ADULT - PROBLEM SELECTOR PROBLEM 2
Ascites
Ascites
Malignant neoplasm of ovary, unspecified laterality

## 2018-04-19 NOTE — DISCHARGE NOTE ADULT - CARE PLAN
Principal Discharge DX:	Bacteremia  Goal:	continue antibiotic as prescribed  Assessment and plan of treatment:	Follow-up with your Oncologist/PCP--call for appointment  Secondary Diagnosis:	Ascites  Assessment and plan of treatment:	s/p paracentesis  Follow-up  with your Oncologist --call for appointment  Secondary Diagnosis:	Diabetes mellitus  Assessment and plan of treatment:	HgA1C this admission.  Make sure you get your HgA1c checked every three months.  If you take oral diabetes medications, check your blood glucose two times a day.  If you take insulin, check your blood glucose before meals and at bedtime.  It's important not to skip any meals.  Keep a log of your blood glucose results and always take it with you to your doctor appointments.  Keep a list of your current medications including injectables and over the counter medications and bring this medication list with you to all your doctor appointments.  If you have not seen your ophthalmologist this year call for appointment.  Check your feet daily for redness, sores, or openings. Do not self treat. If no improvement in two days call your primary care physician for an appointment.  Low blood sugar (hypoglycemia) is a blood sugar below 70mg/dl. Check your blood sugar if you feel signs/symptoms of hypoglycemia. If your blood sugar is below 70 take 15 grams of carbohydrates (ex 4 oz of apple juice, 3-4 glucose tablets, or 4-6 oz of regular soda) wait 15 minutes and repeat blood sugar to make sure it comes up above 70.  If your blood sugar is above 70 and you are due for a meal, have a meal.  If you are not due for a meal have a snack.  This snack helps keeps your blood sugar at a safe range.  Secondary Diagnosis:	Essential hypertension  Assessment and plan of treatment:	Low salt diet  Activity as tolerated.  Take all medication as prescribed.  Follow up with your medical doctor for routine blood pressure monitoring at your next visit.  Notify your doctor if you have any of the following symptoms:   Dizziness, Lightheadedness, Blurry vision, Headache, Chest pain, Shortness of breath  Secondary Diagnosis:	Malignant neoplasm of ovary, unspecified laterality  Assessment and plan of treatment:	Follow-up  with your Oncologist --call for appointment  Secondary Diagnosis:	COPD (chronic obstructive pulmonary disease)  Goal:	stable  Assessment and plan of treatment:	Call your Health Care provider upon arrival home to make a follow up appointment within one week.  Take all inhalers as prescribed by your Health Care Provider.  Take steroids as prescribed by your Health Care Provider.  If your cough increases infrequency and severity and/or you have shortness of breath or increased shortness of breath call your Health Care Provider.  If you develop fever, chills, night sweats, malaise, and/or change in mental status call your Health care Provider.  Nutrition is very important.  Eat small frequent meals.  Increase your activity as tolerated.  Do not stay in bed all day  Secondary Diagnosis:	Brain lesion  Assessment and plan of treatment:	Dr. Phoenix at: [address] [facility].     Should there be a change in her status please let us know at : [phone].      Her appointment is for 4/26/18 at 1pm with Dr. Phoenix.

## 2018-04-19 NOTE — DISCHARGE NOTE ADULT - PATIENT PORTAL LINK FT
You can access the [facility] Patient Portal, offered by [facility], by registering with the following website: [email or web address]

## 2018-04-19 NOTE — DISCHARGE NOTE ADULT - PLAN OF CARE
continue antibiotic as prescribed Follow-up with your Oncologist/PCP--call for appointment s/p paracentesis  Follow-up  with your Oncologist --call for appointment HgA1C this admission.  Make sure you get your HgA1c checked every three months.  If you take oral diabetes medications, check your blood glucose two times a day.  If you take insulin, check your blood glucose before meals and at bedtime.  It's important not to skip any meals.  Keep a log of your blood glucose results and always take it with you to your doctor appointments.  Keep a list of your current medications including injectables and over the counter medications and bring this medication list with you to all your doctor appointments.  If you have not seen your ophthalmologist this year call for appointment.  Check your feet daily for redness, sores, or openings. Do not self treat. If no improvement in two days call your primary care physician for an appointment.  Low blood sugar (hypoglycemia) is a blood sugar below 70mg/dl. Check your blood sugar if you feel signs/symptoms of hypoglycemia. If your blood sugar is below 70 take 15 grams of carbohydrates (ex 4 oz of apple juice, 3-4 glucose tablets, or 4-6 oz of regular soda) wait 15 minutes and repeat blood sugar to make sure it comes up above 70.  If your blood sugar is above 70 and you are due for a meal, have a meal.  If you are not due for a meal have a snack.  This snack helps keeps your blood sugar at a safe range. Low salt diet  Activity as tolerated.  Take all medication as prescribed.  Follow up with your medical doctor for routine blood pressure monitoring at your next visit.  Notify your doctor if you have any of the following symptoms:   Dizziness, Lightheadedness, Blurry vision, Headache, Chest pain, Shortness of breath Follow-up  with your Oncologist --call for appointment stable Call your Health Care provider upon arrival home to make a follow up appointment within one week.  Take all inhalers as prescribed by your Health Care Provider.  Take steroids as prescribed by your Health Care Provider.  If your cough increases infrequency and severity and/or you have shortness of breath or increased shortness of breath call your Health Care Provider.  If you develop fever, chills, night sweats, malaise, and/or change in mental status call your Health care Provider.  Nutrition is very important.  Eat small frequent meals.  Increase your activity as tolerated.  Do not stay in bed all day Dr. Phoenix at: [address].     Should there be a change in her status please let us know at : [phone].      Her appointment is for 4/26/18 at 1pm with Dr. Phoenix.

## 2018-04-20 VITALS
OXYGEN SATURATION: 95 % | TEMPERATURE: 98 F | DIASTOLIC BLOOD PRESSURE: 79 MMHG | HEART RATE: 95 BPM | RESPIRATION RATE: 18 BRPM | SYSTOLIC BLOOD PRESSURE: 128 MMHG

## 2018-04-20 LAB
ANION GAP SERPL CALC-SCNC: 14 MMOL/L — SIGNIFICANT CHANGE UP (ref 5–17)
BUN SERPL-MCNC: 15 MG/DL — SIGNIFICANT CHANGE UP (ref 7–23)
CALCIUM SERPL-MCNC: 8.9 MG/DL — SIGNIFICANT CHANGE UP (ref 8.4–10.5)
CHLORIDE SERPL-SCNC: 101 MMOL/L — SIGNIFICANT CHANGE UP (ref 96–108)
CO2 SERPL-SCNC: 24 MMOL/L — SIGNIFICANT CHANGE UP (ref 22–31)
CREAT SERPL-MCNC: 0.89 MG/DL — SIGNIFICANT CHANGE UP (ref 0.5–1.3)
CULTURE RESULTS: SIGNIFICANT CHANGE UP
CULTURE RESULTS: SIGNIFICANT CHANGE UP
GLUCOSE SERPL-MCNC: 109 MG/DL — HIGH (ref 70–99)
HCT VFR BLD CALC: 37.1 % — SIGNIFICANT CHANGE UP (ref 34.5–45)
HGB BLD-MCNC: 11.4 G/DL — LOW (ref 11.5–15.5)
MCHC RBC-ENTMCNC: 28.1 PG — SIGNIFICANT CHANGE UP (ref 27–34)
MCHC RBC-ENTMCNC: 30.7 GM/DL — LOW (ref 32–36)
MCV RBC AUTO: 91.6 FL — SIGNIFICANT CHANGE UP (ref 80–100)
PLATELET # BLD AUTO: 371 K/UL — SIGNIFICANT CHANGE UP (ref 150–400)
POTASSIUM SERPL-MCNC: 3.9 MMOL/L — SIGNIFICANT CHANGE UP (ref 3.5–5.3)
POTASSIUM SERPL-SCNC: 3.9 MMOL/L — SIGNIFICANT CHANGE UP (ref 3.5–5.3)
RBC # BLD: 4.05 M/UL — SIGNIFICANT CHANGE UP (ref 3.8–5.2)
RBC # FLD: 15.1 % — HIGH (ref 10.3–14.5)
SODIUM SERPL-SCNC: 139 MMOL/L — SIGNIFICANT CHANGE UP (ref 135–145)
SPECIMEN SOURCE: SIGNIFICANT CHANGE UP
SPECIMEN SOURCE: SIGNIFICANT CHANGE UP
WBC # BLD: 11.83 K/UL — HIGH (ref 3.8–10.5)
WBC # FLD AUTO: 11.83 K/UL — HIGH (ref 3.8–10.5)

## 2018-04-20 PROCEDURE — 83615 LACTATE (LD) (LDH) ENZYME: CPT

## 2018-04-20 PROCEDURE — 49083 ABD PARACENTESIS W/IMAGING: CPT

## 2018-04-20 PROCEDURE — 85014 HEMATOCRIT: CPT

## 2018-04-20 PROCEDURE — 97162 PT EVAL MOD COMPLEX 30 MIN: CPT

## 2018-04-20 PROCEDURE — 96374 THER/PROPH/DIAG INJ IV PUSH: CPT | Mod: XU

## 2018-04-20 PROCEDURE — 88341 IMHCHEM/IMCYTCHM EA ADD ANTB: CPT

## 2018-04-20 PROCEDURE — 74177 CT ABD & PELVIS W/CONTRAST: CPT

## 2018-04-20 PROCEDURE — 85610 PROTHROMBIN TIME: CPT

## 2018-04-20 PROCEDURE — 84295 ASSAY OF SERUM SODIUM: CPT

## 2018-04-20 PROCEDURE — 82803 BLOOD GASES ANY COMBINATION: CPT

## 2018-04-20 PROCEDURE — 71045 X-RAY EXAM CHEST 1 VIEW: CPT

## 2018-04-20 PROCEDURE — 82945 GLUCOSE OTHER FLUID: CPT

## 2018-04-20 PROCEDURE — 85027 COMPLETE CBC AUTOMATED: CPT

## 2018-04-20 PROCEDURE — 83036 HEMOGLOBIN GLYCOSYLATED A1C: CPT

## 2018-04-20 PROCEDURE — 88112 CYTOPATH CELL ENHANCE TECH: CPT

## 2018-04-20 PROCEDURE — 87102 FUNGUS ISOLATION CULTURE: CPT

## 2018-04-20 PROCEDURE — 83690 ASSAY OF LIPASE: CPT

## 2018-04-20 PROCEDURE — 80048 BASIC METABOLIC PNL TOTAL CA: CPT

## 2018-04-20 PROCEDURE — 87116 MYCOBACTERIA CULTURE: CPT

## 2018-04-20 PROCEDURE — 87086 URINE CULTURE/COLONY COUNT: CPT

## 2018-04-20 PROCEDURE — 84157 ASSAY OF PROTEIN OTHER: CPT

## 2018-04-20 PROCEDURE — 76705 ECHO EXAM OF ABDOMEN: CPT

## 2018-04-20 PROCEDURE — 88305 TISSUE EXAM BY PATHOLOGIST: CPT

## 2018-04-20 PROCEDURE — A9537: CPT

## 2018-04-20 PROCEDURE — 88342 IMHCHEM/IMCYTCHM 1ST ANTB: CPT

## 2018-04-20 PROCEDURE — 82435 ASSAY OF BLOOD CHLORIDE: CPT

## 2018-04-20 PROCEDURE — 78227 HEPATOBIL SYST IMAGE W/DRUG: CPT

## 2018-04-20 PROCEDURE — 87075 CULTR BACTERIA EXCEPT BLOOD: CPT

## 2018-04-20 PROCEDURE — 87206 SMEAR FLUORESCENT/ACID STAI: CPT

## 2018-04-20 PROCEDURE — 87015 SPECIMEN INFECT AGNT CONCNTJ: CPT

## 2018-04-20 PROCEDURE — 93005 ELECTROCARDIOGRAM TRACING: CPT

## 2018-04-20 PROCEDURE — 87150 DNA/RNA AMPLIFIED PROBE: CPT

## 2018-04-20 PROCEDURE — 93308 TTE F-UP OR LMTD: CPT

## 2018-04-20 PROCEDURE — 99285 EMERGENCY DEPT VISIT HI MDM: CPT | Mod: 25

## 2018-04-20 PROCEDURE — 80053 COMPREHEN METABOLIC PANEL: CPT

## 2018-04-20 PROCEDURE — 93970 EXTREMITY STUDY: CPT

## 2018-04-20 PROCEDURE — 70553 MRI BRAIN STEM W/O & W/DYE: CPT

## 2018-04-20 PROCEDURE — 83605 ASSAY OF LACTIC ACID: CPT

## 2018-04-20 PROCEDURE — 71260 CT THORAX DX C+: CPT

## 2018-04-20 PROCEDURE — 85730 THROMBOPLASTIN TIME PARTIAL: CPT

## 2018-04-20 PROCEDURE — 87186 SC STD MICRODIL/AGAR DIL: CPT

## 2018-04-20 PROCEDURE — 70470 CT HEAD/BRAIN W/O & W/DYE: CPT

## 2018-04-20 PROCEDURE — 81001 URINALYSIS AUTO W/SCOPE: CPT

## 2018-04-20 PROCEDURE — 94640 AIRWAY INHALATION TREATMENT: CPT

## 2018-04-20 PROCEDURE — 82042 OTHER SOURCE ALBUMIN QUAN EA: CPT

## 2018-04-20 PROCEDURE — 87040 BLOOD CULTURE FOR BACTERIA: CPT

## 2018-04-20 PROCEDURE — C1729: CPT

## 2018-04-20 PROCEDURE — 82330 ASSAY OF CALCIUM: CPT

## 2018-04-20 PROCEDURE — 82150 ASSAY OF AMYLASE: CPT

## 2018-04-20 PROCEDURE — 82947 ASSAY GLUCOSE BLOOD QUANT: CPT

## 2018-04-20 PROCEDURE — 82962 GLUCOSE BLOOD TEST: CPT

## 2018-04-20 PROCEDURE — 89051 BODY FLUID CELL COUNT: CPT

## 2018-04-20 PROCEDURE — 87205 SMEAR GRAM STAIN: CPT

## 2018-04-20 PROCEDURE — A9585: CPT

## 2018-04-20 PROCEDURE — 84132 ASSAY OF SERUM POTASSIUM: CPT

## 2018-04-20 PROCEDURE — 96375 TX/PRO/DX INJ NEW DRUG ADDON: CPT | Mod: XU

## 2018-04-20 PROCEDURE — 87070 CULTURE OTHR SPECIMN AEROBIC: CPT

## 2018-04-20 RX ORDER — DEXAMETHASONE 0.5 MG/5ML
2 ELIXIR ORAL
Qty: 6 | Refills: 0 | OUTPATIENT
Start: 2018-04-20

## 2018-04-20 RX ORDER — APIXABAN 2.5 MG/1
1 TABLET, FILM COATED ORAL
Qty: 60 | Refills: 0 | OUTPATIENT
Start: 2018-04-20 | End: 2018-05-19

## 2018-04-20 RX ORDER — DEXAMETHASONE 0.5 MG/5ML
2 ELIXIR ORAL
Qty: 6 | Refills: 0 | OUTPATIENT
Start: 2018-04-20 | End: 2018-04-23

## 2018-04-20 RX ORDER — DOCUSATE SODIUM 100 MG
1 CAPSULE ORAL
Qty: 0 | Refills: 0 | COMMUNITY
Start: 2018-04-20

## 2018-04-20 RX ORDER — POLYETHYLENE GLYCOL 3350 17 G/17G
17 POWDER, FOR SOLUTION ORAL
Qty: 0 | Refills: 0 | COMMUNITY
Start: 2018-04-20

## 2018-04-20 RX ORDER — SENNA PLUS 8.6 MG/1
2 TABLET ORAL
Qty: 0 | Refills: 0 | COMMUNITY
Start: 2018-04-20

## 2018-04-20 RX ORDER — APIXABAN 2.5 MG/1
2.5 TABLET, FILM COATED ORAL EVERY 12 HOURS
Qty: 0 | Refills: 0 | Status: DISCONTINUED | OUTPATIENT
Start: 2018-04-20 | End: 2018-04-20

## 2018-04-20 RX ORDER — FUROSEMIDE 40 MG
1 TABLET ORAL
Qty: 30 | Refills: 0 | OUTPATIENT
Start: 2018-04-20 | End: 2018-05-19

## 2018-04-20 RX ORDER — CIPROFLOXACIN LACTATE 400MG/40ML
1 VIAL (ML) INTRAVENOUS
Qty: 14 | Refills: 0 | OUTPATIENT
Start: 2018-04-20

## 2018-04-20 RX ORDER — APIXABAN 2.5 MG/1
5 TABLET, FILM COATED ORAL EVERY 12 HOURS
Qty: 0 | Refills: 0 | Status: DISCONTINUED | OUTPATIENT
Start: 2018-04-20 | End: 2018-04-20

## 2018-04-20 RX ORDER — FUROSEMIDE 40 MG
1 TABLET ORAL
Qty: 0 | Refills: 0 | COMMUNITY

## 2018-04-20 RX ADMIN — MORPHINE SULFATE 4 MILLIGRAM(S): 50 CAPSULE, EXTENDED RELEASE ORAL at 00:52

## 2018-04-20 RX ADMIN — Medication 3 MILLILITER(S): at 05:53

## 2018-04-20 RX ADMIN — Medication 500 MILLIGRAM(S): at 05:48

## 2018-04-20 RX ADMIN — Medication 4 MILLIGRAM(S): at 05:48

## 2018-04-20 RX ADMIN — Medication 1: at 12:11

## 2018-04-20 RX ADMIN — Medication 100 MILLIGRAM(S): at 05:48

## 2018-04-20 RX ADMIN — SODIUM CHLORIDE 100 MILLILITER(S): 9 INJECTION INTRAMUSCULAR; INTRAVENOUS; SUBCUTANEOUS at 09:45

## 2018-04-20 RX ADMIN — Medication 3 MILLILITER(S): at 11:19

## 2018-04-20 RX ADMIN — Medication 40 MILLIGRAM(S): at 05:48

## 2018-04-20 RX ADMIN — LISINOPRIL 40 MILLIGRAM(S): 2.5 TABLET ORAL at 05:49

## 2018-04-20 RX ADMIN — Medication 100 MILLIGRAM(S): at 14:15

## 2018-04-20 RX ADMIN — ENOXAPARIN SODIUM 90 MILLIGRAM(S): 100 INJECTION SUBCUTANEOUS at 05:53

## 2018-04-20 RX ADMIN — MORPHINE SULFATE 4 MILLIGRAM(S): 50 CAPSULE, EXTENDED RELEASE ORAL at 00:16

## 2018-04-20 NOTE — PROGRESS NOTE ADULT - SUBJECTIVE AND OBJECTIVE BOX
Pt seen and examined  Awake, Alert Feels better  LE Dopplers revealed Bilat Acute Below The Knee DVT in Peroneal Veins  On Lovenox    PAST MEDICAL & SURGICAL HISTORY:  Malignant neoplasm of ovary, unspecified laterality  S/P DENNY-BSO      ROS:  Negative except for: fatigue/weakness, constipation    MEDICATIONS  (STANDING):  ALBUTerol/ipratropium for Nebulization 3 milliLiter(s) Nebulizer every 6 hours  ciprofloxacin     Tablet 500 milliGRAM(s) Oral every 12 hours  dexamethasone     Tablet 4 milliGRAM(s) Oral every 12 hours  dextrose 5%. 1000 milliLiter(s) (50 mL/Hr) IV Continuous <Continuous>  dextrose 50% Injectable 12.5 Gram(s) IV Push once  dextrose 50% Injectable 25 Gram(s) IV Push once  dextrose 50% Injectable 25 Gram(s) IV Push once  docusate sodium 100 milliGRAM(s) Oral three times a day  enoxaparin Injectable 90 milliGRAM(s) SubCutaneous two times a day  furosemide    Tablet 40 milliGRAM(s) Oral daily  influenza   Vaccine 0.5 milliLiter(s) IntraMuscular once  insulin lispro (HumaLOG) corrective regimen sliding scale   SubCutaneous three times a day before meals  insulin lispro (HumaLOG) corrective regimen sliding scale   SubCutaneous at bedtime  lisinopril 40 milliGRAM(s) Oral daily  polyethylene glycol 3350 17 Gram(s) Oral once  sodium chloride 0.9%. 1000 milliLiter(s) (100 mL/Hr) IV Continuous <Continuous>    MEDICATIONS  (PRN):  acetaminophen   Tablet. 650 milliGRAM(s) Oral every 6 hours PRN Mild Pain (1 - 3)  dextrose Gel 1 Dose(s) Oral once PRN Blood Glucose LESS THAN 70 milliGRAM(s)/deciliter  glucagon  Injectable 1 milliGRAM(s) IntraMuscular once PRN Glucose LESS THAN 70 milligrams/deciliter  morphine  - Injectable 2 milliGRAM(s) IV Push every 4 hours PRN Moderate Pain (4 - 6)  morphine  - Injectable 4 milliGRAM(s) IV Push every 4 hours PRN Severe Pain (7 - 10)  ondansetron Injectable 4 milliGRAM(s) IV Push every 6 hours PRN Nausea  senna 2 Tablet(s) Oral at bedtime PRN Constipation      Allergies    No Known Allergies    Intolerances        Vital Signs Last 24 Hrs  T(C): 36.8 (20 Apr 2018 00:15), Max: 36.9 (19 Apr 2018 07:26)  T(F): 98.3 (20 Apr 2018 00:15), Max: 98.5 (19 Apr 2018 07:26)  HR: 87 (20 Apr 2018 05:44) (87 - 103)  BP: 154/92 (20 Apr 2018 05:44) (131/82 - 154/92)  BP(mean): --  RR: 18 (20 Apr 2018 00:15) (18 - 20)  SpO2: 91% (20 Apr 2018 00:15) (91% - 93%)    PHYSICAL EXAM      General: adult in NAD. Looks Non-Toxic  HEENT: clear oropharynx, anicteric sclera, pink conjunctiva  Neck: supple  CV: RRR S1,S2+   Lungs: positive air movement b/l . Diminished BS at bases, R>L  Abdomen: Distended, non tender, + BS  Ext: no clubbing cyanosis or edema  Skin: no rashes and no petechiae  Neuro: alert and oriented X 4, no focal deficits      LABS:                          11.5   11.02 )-----------( 374      ( 19 Apr 2018 07:30 )             37.1     Serial CBC's  04-19 @ 07:30  Hct-37.1 / Hgb-11.5 / Plat-374 / RBC-3.96 / WBC-11.02          Serial CBC's  04-18 @ 09:16  Hct-33.9 / Hgb-10.6 / Plat-340 / RBC-3.70 / WBC-8.05            04-19    139  |  101  |  18  ----------------------------<  169<H>  4.2   |  22  |  0.91    Ca    8.9      19 Apr 2018 06:39    TPro  6.1  /  Alb  2.5<L>  /  TBili  0.6  /  DBili  x   /  AST  70<H>  /  ALT  72<H>  /  AlkPhos  1602<H>  04-18          WBC Count: 11.02 K/uL (04-19 @ 07:30)  Hemoglobin: 11.5 g/dL (04-19 @ 07:30)            RADIOLOGY & ADDITIONAL STUDIES:

## 2018-04-20 NOTE — CHART NOTE - NSCHARTNOTEFT_GEN_A_CORE
Received a call from primary team about consideration for a cholecystostomy tube. Pt had an ERCP at OSH with biliary stent placement. Unclear if patient's current episode is due to biliary etiology. Pt did have improvement in symptoms with drainage of ascites. Pt is currently clinically improved. Percutaneous cholecystostomy tubes have risks especially in the setting of ascites. Would not recommend a cholecystostomy tube at this time. Discussed with attending Dr Grullon. Received a call from primary team about consideration for a cholecystostomy tube. Pt had an ERCP at OSH with biliary stent placement. Unclear if patient's current episode is due to biliary etiology. Pt did have improvement in symptoms with drainage of ascites. Pt is currently clinically improved. Percutaneous cholecystostomy tubes have risks especially in the setting of ascites. Would not recommend a cholecystostomy tube at this time. No additional GI procedures at this time. Discussed with attending Dr Grullon.

## 2018-04-20 NOTE — PROGRESS NOTE ADULT - ASSESSMENT
1. E. coli Bacteremia/Sepsis    -- will cont outpt Abx w Cipro until 4/27 per ID    2. Cholecystitis, recent biliary stent    -- Surgery input appreciated  -- GI Consulted, pt may need ERCP and stent replacement  -- would recommend Percut Cholecystostomy as this is the second time GB pain flare up in last 1 mos with sig rise in Alk Phos    3. 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  Small L temporal Lobe Brain Met    -- Pt needs outpatient f/u  with radiation oncology for management of brain met w SRS  -- Decrease Dexamethasone to 4mg PO Q 8 hrs. GI Prophylaxis w PPI. Can taper after SRS      4. 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 new brain met. Last Chemo was Carboplatin + Doxil on 9/19/17. Refused Maintenance Olaparib. Recurrence noted on CT on 3/6/18.    -- pt previously declined further chemo but having second thoughts. If she recovers from acute illness would be candidate for palliative chemo with Gemcitabine +/- Bevacizumab  -- s/p Paracentesis. Ascitic fluid pos for malignant cells      No obejection to D/C Home. F/U as outpt    Jose Fonseca MD  cell # [phone]

## 2018-04-20 NOTE — PROGRESS NOTE ADULT - PROVIDER SPECIALTY LIST ADULT
Gastroenterology
Heme/Onc
Infectious Disease
Internal Medicine
Intervent Radiology
Intervent Radiology
Neurosurgery
Neurosurgery
Surgery
Internal Medicine
Internal Medicine

## 2018-04-21 LAB
CULTURE RESULTS: SIGNIFICANT CHANGE UP
SPECIMEN SOURCE: SIGNIFICANT CHANGE UP

## 2018-04-25 ENCOUNTER — OUTPATIENT (OUTPATIENT)
Dept: OUTPATIENT SERVICES | Facility: HOSPITAL | Age: 73
LOS: 1 days | Discharge: HOME | End: 2018-04-25

## 2018-04-25 ENCOUNTER — INPATIENT (INPATIENT)
Facility: HOSPITAL | Age: 73
LOS: 0 days | DRG: 871 | End: 2018-04-26
Attending: INTERNAL MEDICINE | Admitting: INTERNAL MEDICINE
Payer: MEDICARE

## 2018-04-25 VITALS
HEIGHT: 62 IN | TEMPERATURE: 98 F | WEIGHT: 190.04 LBS | OXYGEN SATURATION: 91 % | HEART RATE: 125 BPM | RESPIRATION RATE: 18 BRPM | SYSTOLIC BLOOD PRESSURE: 72 MMHG | DIASTOLIC BLOOD PRESSURE: 40 MMHG

## 2018-04-25 DIAGNOSIS — Z90.710 ACQUIRED ABSENCE OF BOTH CERVIX AND UTERUS: Chronic | ICD-10-CM

## 2018-04-25 DIAGNOSIS — J44.9 CHRONIC OBSTRUCTIVE PULMONARY DISEASE, UNSPECIFIED: ICD-10-CM

## 2018-04-25 DIAGNOSIS — I82.409 ACUTE EMBOLISM AND THROMBOSIS OF UNSPECIFIED DEEP VEINS OF UNSPECIFIED LOWER EXTREMITY: ICD-10-CM

## 2018-04-25 DIAGNOSIS — C56.9 MALIGNANT NEOPLASM OF UNSPECIFIED OVARY: ICD-10-CM

## 2018-04-25 DIAGNOSIS — R78.81 BACTEREMIA: ICD-10-CM

## 2018-04-25 DIAGNOSIS — N17.9 ACUTE KIDNEY FAILURE, UNSPECIFIED: ICD-10-CM

## 2018-04-25 DIAGNOSIS — A41.9 SEPSIS, UNSPECIFIED ORGANISM: ICD-10-CM

## 2018-04-25 DIAGNOSIS — R53.1 WEAKNESS: ICD-10-CM

## 2018-04-25 DIAGNOSIS — I10 ESSENTIAL (PRIMARY) HYPERTENSION: ICD-10-CM

## 2018-04-25 LAB
ALBUMIN SERPL ELPH-MCNC: 2.1 G/DL — LOW (ref 3.3–5)
ALBUMIN SERPL ELPH-MCNC: 2.2 G/DL — LOW (ref 3.3–5)
ALP SERPL-CCNC: 1074 U/L — HIGH (ref 40–120)
ALP SERPL-CCNC: 1256 U/L — HIGH (ref 40–120)
ALT FLD-CCNC: 69 U/L — HIGH (ref 10–45)
ALT FLD-CCNC: 77 U/L — HIGH (ref 10–45)
ANION GAP SERPL CALC-SCNC: 15 MMOL/L — SIGNIFICANT CHANGE UP (ref 5–17)
ANION GAP SERPL CALC-SCNC: 17 MMOL/L — SIGNIFICANT CHANGE UP (ref 5–17)
APPEARANCE UR: ABNORMAL
APTT BLD: 30.6 SEC — SIGNIFICANT CHANGE UP (ref 27.5–37.4)
AST SERPL-CCNC: 71 U/L — HIGH (ref 10–40)
AST SERPL-CCNC: 83 U/L — HIGH (ref 10–40)
BASOPHILS # BLD AUTO: 0.1 K/UL — SIGNIFICANT CHANGE UP (ref 0–0.2)
BASOPHILS NFR BLD AUTO: 0.2 % — SIGNIFICANT CHANGE UP (ref 0–2)
BILIRUB SERPL-MCNC: 1.2 MG/DL — SIGNIFICANT CHANGE UP (ref 0.2–1.2)
BILIRUB SERPL-MCNC: 1.2 MG/DL — SIGNIFICANT CHANGE UP (ref 0.2–1.2)
BILIRUB UR-MCNC: ABNORMAL
BUN SERPL-MCNC: 40 MG/DL — HIGH (ref 7–23)
BUN SERPL-MCNC: 40 MG/DL — HIGH (ref 7–23)
CALCIUM SERPL-MCNC: 8.6 MG/DL — SIGNIFICANT CHANGE UP (ref 8.4–10.5)
CALCIUM SERPL-MCNC: 9.4 MG/DL — SIGNIFICANT CHANGE UP (ref 8.4–10.5)
CHLORIDE SERPL-SCNC: 94 MMOL/L — LOW (ref 96–108)
CHLORIDE SERPL-SCNC: 97 MMOL/L — SIGNIFICANT CHANGE UP (ref 96–108)
CHLORIDE UR-SCNC: <35 MMOL/L — SIGNIFICANT CHANGE UP
CO2 SERPL-SCNC: 25 MMOL/L — SIGNIFICANT CHANGE UP (ref 22–31)
CO2 SERPL-SCNC: 25 MMOL/L — SIGNIFICANT CHANGE UP (ref 22–31)
COLOR SPEC: YELLOW — SIGNIFICANT CHANGE UP
CREAT ?TM UR-MCNC: 150 MG/DL — SIGNIFICANT CHANGE UP
CREAT SERPL-MCNC: 2.24 MG/DL — HIGH (ref 0.5–1.3)
CREAT SERPL-MCNC: 2.28 MG/DL — HIGH (ref 0.5–1.3)
DIFF PNL FLD: NEGATIVE — SIGNIFICANT CHANGE UP
EOSINOPHIL # BLD AUTO: 0 K/UL — SIGNIFICANT CHANGE UP (ref 0–0.5)
EOSINOPHIL NFR BLD AUTO: 0.1 % — SIGNIFICANT CHANGE UP (ref 0–6)
GAS PNL BLDV: SIGNIFICANT CHANGE UP
GLUCOSE SERPL-MCNC: 144 MG/DL — HIGH (ref 70–99)
GLUCOSE SERPL-MCNC: 179 MG/DL — HIGH (ref 70–99)
GLUCOSE UR QL: ABNORMAL
HCT VFR BLD CALC: 32.7 % — LOW (ref 34.5–45)
HCT VFR BLD CALC: 35 % — SIGNIFICANT CHANGE UP (ref 34.5–45)
HGB BLD-MCNC: 10.5 G/DL — LOW (ref 11.5–15.5)
HGB BLD-MCNC: 11.2 G/DL — LOW (ref 11.5–15.5)
INR BLD: 1.62 RATIO — HIGH (ref 0.88–1.16)
KETONES UR-MCNC: NEGATIVE — SIGNIFICANT CHANGE UP
LEUKOCYTE ESTERASE UR-ACNC: NEGATIVE — SIGNIFICANT CHANGE UP
LIDOCAIN IGE QN: >530 U/L — HIGH (ref 7–60)
LYMPHOCYTES # BLD AUTO: 1.9 K/UL — SIGNIFICANT CHANGE UP (ref 1–3.3)
LYMPHOCYTES # BLD AUTO: 7.2 % — LOW (ref 13–44)
MAGNESIUM SERPL-MCNC: 1.5 MG/DL — LOW (ref 1.6–2.6)
MCHC RBC-ENTMCNC: 30.2 PG — SIGNIFICANT CHANGE UP (ref 27–34)
MCHC RBC-ENTMCNC: 30.8 PG — SIGNIFICANT CHANGE UP (ref 27–34)
MCHC RBC-ENTMCNC: 32.1 GM/DL — SIGNIFICANT CHANGE UP (ref 32–36)
MCHC RBC-ENTMCNC: 32.1 GM/DL — SIGNIFICANT CHANGE UP (ref 32–36)
MCV RBC AUTO: 94.2 FL — SIGNIFICANT CHANGE UP (ref 80–100)
MCV RBC AUTO: 95.7 FL — SIGNIFICANT CHANGE UP (ref 80–100)
MONOCYTES # BLD AUTO: 1.7 K/UL — HIGH (ref 0–0.9)
MONOCYTES NFR BLD AUTO: 6.4 % — SIGNIFICANT CHANGE UP (ref 2–14)
NEUTROPHILS # BLD AUTO: 22.7 K/UL — HIGH (ref 1.8–7.4)
NEUTROPHILS NFR BLD AUTO: 86.2 % — HIGH (ref 43–77)
NITRITE UR-MCNC: NEGATIVE — SIGNIFICANT CHANGE UP
PH UR: 5.5 — SIGNIFICANT CHANGE UP (ref 5–8)
PHOSPHATE SERPL-MCNC: 4.8 MG/DL — HIGH (ref 2.5–4.5)
PLATELET # BLD AUTO: 339 K/UL — SIGNIFICANT CHANGE UP (ref 150–400)
PLATELET # BLD AUTO: 399 K/UL — SIGNIFICANT CHANGE UP (ref 150–400)
POTASSIUM SERPL-MCNC: 4.2 MMOL/L — SIGNIFICANT CHANGE UP (ref 3.5–5.3)
POTASSIUM SERPL-MCNC: 4.9 MMOL/L — SIGNIFICANT CHANGE UP (ref 3.5–5.3)
POTASSIUM SERPL-SCNC: 4.2 MMOL/L — SIGNIFICANT CHANGE UP (ref 3.5–5.3)
POTASSIUM SERPL-SCNC: 4.9 MMOL/L — SIGNIFICANT CHANGE UP (ref 3.5–5.3)
PROT SERPL-MCNC: 6.2 G/DL — SIGNIFICANT CHANGE UP (ref 6–8.3)
PROT SERPL-MCNC: 6.4 G/DL — SIGNIFICANT CHANGE UP (ref 6–8.3)
PROT UR-MCNC: 100 MG/DL
PROTHROM AB SERPL-ACNC: 17.7 SEC — HIGH (ref 9.8–12.7)
RBC # BLD: 3.42 M/UL — LOW (ref 3.8–5.2)
RBC # BLD: 3.72 M/UL — LOW (ref 3.8–5.2)
RBC # FLD: 15.9 % — HIGH (ref 10.3–14.5)
RBC # FLD: 16.1 % — HIGH (ref 10.3–14.5)
SODIUM SERPL-SCNC: 136 MMOL/L — SIGNIFICANT CHANGE UP (ref 135–145)
SODIUM SERPL-SCNC: 137 MMOL/L — SIGNIFICANT CHANGE UP (ref 135–145)
SODIUM UR-SCNC: 20 MMOL/L — SIGNIFICANT CHANGE UP
SP GR SPEC: 1.03 — HIGH (ref 1.01–1.02)
UROBILINOGEN FLD QL: NEGATIVE — SIGNIFICANT CHANGE UP
WBC # BLD: 24.6 K/UL — HIGH (ref 3.8–10.5)
WBC # BLD: 26.4 K/UL — HIGH (ref 3.8–10.5)
WBC # FLD AUTO: 24.6 K/UL — HIGH (ref 3.8–10.5)
WBC # FLD AUTO: 26.4 K/UL — HIGH (ref 3.8–10.5)

## 2018-04-25 PROCEDURE — 93010 ELECTROCARDIOGRAM REPORT: CPT

## 2018-04-25 PROCEDURE — 99291 CRITICAL CARE FIRST HOUR: CPT

## 2018-04-25 PROCEDURE — 71045 X-RAY EXAM CHEST 1 VIEW: CPT | Mod: 26

## 2018-04-25 PROCEDURE — 74176 CT ABD & PELVIS W/O CONTRAST: CPT | Mod: 26

## 2018-04-25 PROCEDURE — 99223 1ST HOSP IP/OBS HIGH 75: CPT

## 2018-04-25 PROCEDURE — 76705 ECHO EXAM OF ABDOMEN: CPT | Mod: 26

## 2018-04-25 PROCEDURE — 76937 US GUIDE VASCULAR ACCESS: CPT | Mod: 26

## 2018-04-25 PROCEDURE — 74019 RADEX ABDOMEN 2 VIEWS: CPT | Mod: 26

## 2018-04-25 RX ORDER — VANCOMYCIN HCL 1 G
1000 VIAL (EA) INTRAVENOUS ONCE
Qty: 0 | Refills: 0 | Status: COMPLETED | OUTPATIENT
Start: 2018-04-25 | End: 2018-04-25

## 2018-04-25 RX ORDER — GLUCAGON INJECTION, SOLUTION 0.5 MG/.1ML
1 INJECTION, SOLUTION SUBCUTANEOUS ONCE
Qty: 0 | Refills: 0 | Status: DISCONTINUED | OUTPATIENT
Start: 2018-04-25 | End: 2018-04-26

## 2018-04-25 RX ORDER — HYDROCORTISONE 20 MG
50 TABLET ORAL EVERY 8 HOURS
Qty: 0 | Refills: 0 | Status: DISCONTINUED | OUTPATIENT
Start: 2018-04-25 | End: 2018-04-26

## 2018-04-25 RX ORDER — ACETAMINOPHEN 500 MG
650 TABLET ORAL EVERY 6 HOURS
Qty: 0 | Refills: 0 | Status: DISCONTINUED | OUTPATIENT
Start: 2018-04-25 | End: 2018-04-26

## 2018-04-25 RX ORDER — MEROPENEM 1 G/30ML
INJECTION INTRAVENOUS
Qty: 0 | Refills: 0 | Status: DISCONTINUED | OUTPATIENT
Start: 2018-04-25 | End: 2018-04-26

## 2018-04-25 RX ORDER — PHENYLEPHRINE HYDROCHLORIDE 10 MG/ML
0.4 INJECTION INTRAVENOUS
Qty: 80 | Refills: 0 | Status: DISCONTINUED | OUTPATIENT
Start: 2018-04-25 | End: 2018-04-26

## 2018-04-25 RX ORDER — DEXTROSE 50 % IN WATER 50 %
25 SYRINGE (ML) INTRAVENOUS ONCE
Qty: 0 | Refills: 0 | Status: DISCONTINUED | OUTPATIENT
Start: 2018-04-25 | End: 2018-04-26

## 2018-04-25 RX ORDER — DEXTROSE 50 % IN WATER 50 %
1 SYRINGE (ML) INTRAVENOUS ONCE
Qty: 0 | Refills: 0 | Status: DISCONTINUED | OUTPATIENT
Start: 2018-04-25 | End: 2018-04-26

## 2018-04-25 RX ORDER — HEPARIN SODIUM 5000 [USP'U]/ML
3500 INJECTION INTRAVENOUS; SUBCUTANEOUS EVERY 6 HOURS
Qty: 0 | Refills: 0 | Status: DISCONTINUED | OUTPATIENT
Start: 2018-04-25 | End: 2018-04-26

## 2018-04-25 RX ORDER — PIPERACILLIN AND TAZOBACTAM 4; .5 G/20ML; G/20ML
3.38 INJECTION, POWDER, LYOPHILIZED, FOR SOLUTION INTRAVENOUS EVERY 8 HOURS
Qty: 0 | Refills: 0 | Status: DISCONTINUED | OUTPATIENT
Start: 2018-04-25 | End: 2018-04-25

## 2018-04-25 RX ORDER — ALBUTEROL 90 UG/1
2 AEROSOL, METERED ORAL
Qty: 0 | Refills: 0 | COMMUNITY

## 2018-04-25 RX ORDER — ALBUTEROL 90 UG/1
0.5 AEROSOL, METERED ORAL
Qty: 0 | Refills: 0 | COMMUNITY

## 2018-04-25 RX ORDER — BUDESONIDE AND FORMOTEROL FUMARATE DIHYDRATE 160; 4.5 UG/1; UG/1
2 AEROSOL RESPIRATORY (INHALATION)
Qty: 0 | Refills: 0 | Status: DISCONTINUED | OUTPATIENT
Start: 2018-04-25 | End: 2018-04-26

## 2018-04-25 RX ORDER — PIPERACILLIN AND TAZOBACTAM 4; .5 G/20ML; G/20ML
3.38 INJECTION, POWDER, LYOPHILIZED, FOR SOLUTION INTRAVENOUS ONCE
Qty: 0 | Refills: 0 | Status: COMPLETED | OUTPATIENT
Start: 2018-04-25 | End: 2018-04-25

## 2018-04-25 RX ORDER — DEXTROSE 50 % IN WATER 50 %
12.5 SYRINGE (ML) INTRAVENOUS ONCE
Qty: 0 | Refills: 0 | Status: DISCONTINUED | OUTPATIENT
Start: 2018-04-25 | End: 2018-04-26

## 2018-04-25 RX ORDER — MEROPENEM 1 G/30ML
1000 INJECTION INTRAVENOUS ONCE
Qty: 0 | Refills: 0 | Status: COMPLETED | OUTPATIENT
Start: 2018-04-25 | End: 2018-04-25

## 2018-04-25 RX ORDER — SODIUM CHLORIDE 9 MG/ML
1000 INJECTION INTRAMUSCULAR; INTRAVENOUS; SUBCUTANEOUS
Qty: 0 | Refills: 0 | Status: DISCONTINUED | OUTPATIENT
Start: 2018-04-25 | End: 2018-04-26

## 2018-04-25 RX ORDER — RAMIPRIL 5 MG
1 CAPSULE ORAL
Qty: 0 | Refills: 0 | COMMUNITY

## 2018-04-25 RX ORDER — SODIUM CHLORIDE 9 MG/ML
1000 INJECTION INTRAMUSCULAR; INTRAVENOUS; SUBCUTANEOUS ONCE
Qty: 0 | Refills: 0 | Status: COMPLETED | OUTPATIENT
Start: 2018-04-25 | End: 2018-04-25

## 2018-04-25 RX ORDER — SODIUM CHLORIDE 9 MG/ML
2000 INJECTION INTRAMUSCULAR; INTRAVENOUS; SUBCUTANEOUS ONCE
Qty: 0 | Refills: 0 | Status: COMPLETED | OUTPATIENT
Start: 2018-04-25 | End: 2018-04-25

## 2018-04-25 RX ORDER — ACETAMINOPHEN 500 MG
1000 TABLET ORAL ONCE
Qty: 0 | Refills: 0 | Status: COMPLETED | OUTPATIENT
Start: 2018-04-25 | End: 2018-04-25

## 2018-04-25 RX ORDER — MAGNESIUM SULFATE 500 MG/ML
2 VIAL (ML) INJECTION ONCE
Qty: 0 | Refills: 0 | Status: COMPLETED | OUTPATIENT
Start: 2018-04-25 | End: 2018-04-25

## 2018-04-25 RX ORDER — HEPARIN SODIUM 5000 [USP'U]/ML
7000 INJECTION INTRAVENOUS; SUBCUTANEOUS EVERY 6 HOURS
Qty: 0 | Refills: 0 | Status: DISCONTINUED | OUTPATIENT
Start: 2018-04-25 | End: 2018-04-26

## 2018-04-25 RX ORDER — SODIUM CHLORIDE 9 MG/ML
3 INJECTION INTRAMUSCULAR; INTRAVENOUS; SUBCUTANEOUS ONCE
Qty: 0 | Refills: 0 | Status: COMPLETED | OUTPATIENT
Start: 2018-04-25 | End: 2018-04-25

## 2018-04-25 RX ORDER — MEROPENEM 1 G/30ML
1000 INJECTION INTRAVENOUS EVERY 12 HOURS
Qty: 0 | Refills: 0 | Status: DISCONTINUED | OUTPATIENT
Start: 2018-04-26 | End: 2018-04-26

## 2018-04-25 RX ORDER — MIDODRINE HYDROCHLORIDE 2.5 MG/1
10 TABLET ORAL ONCE
Qty: 0 | Refills: 0 | Status: DISCONTINUED | OUTPATIENT
Start: 2018-04-25 | End: 2018-04-25

## 2018-04-25 RX ORDER — INSULIN LISPRO 100/ML
VIAL (ML) SUBCUTANEOUS EVERY 6 HOURS
Qty: 0 | Refills: 0 | Status: DISCONTINUED | OUTPATIENT
Start: 2018-04-25 | End: 2018-04-26

## 2018-04-25 RX ORDER — SENNA PLUS 8.6 MG/1
2 TABLET ORAL AT BEDTIME
Qty: 0 | Refills: 0 | Status: DISCONTINUED | OUTPATIENT
Start: 2018-04-25 | End: 2018-04-26

## 2018-04-25 RX ORDER — HEPARIN SODIUM 5000 [USP'U]/ML
INJECTION INTRAVENOUS; SUBCUTANEOUS
Qty: 25000 | Refills: 0 | Status: DISCONTINUED | OUTPATIENT
Start: 2018-04-25 | End: 2018-04-26

## 2018-04-25 RX ORDER — METFORMIN HYDROCHLORIDE 850 MG/1
1 TABLET ORAL
Qty: 0 | Refills: 0 | COMMUNITY

## 2018-04-25 RX ORDER — BUDESONIDE AND FORMOTEROL FUMARATE DIHYDRATE 160; 4.5 UG/1; UG/1
2 AEROSOL RESPIRATORY (INHALATION)
Qty: 0 | Refills: 0 | COMMUNITY

## 2018-04-25 RX ORDER — SODIUM CHLORIDE 9 MG/ML
1000 INJECTION, SOLUTION INTRAVENOUS
Qty: 0 | Refills: 0 | Status: DISCONTINUED | OUTPATIENT
Start: 2018-04-25 | End: 2018-04-26

## 2018-04-25 RX ORDER — NOREPINEPHRINE BITARTRATE/D5W 8 MG/250ML
0.4 PLASTIC BAG, INJECTION (ML) INTRAVENOUS
Qty: 8 | Refills: 0 | Status: DISCONTINUED | OUTPATIENT
Start: 2018-04-25 | End: 2018-04-25

## 2018-04-25 RX ADMIN — Medication 400 MILLIGRAM(S): at 11:41

## 2018-04-25 RX ADMIN — Medication 50 MILLIGRAM(S): at 18:46

## 2018-04-25 RX ADMIN — HEPARIN SODIUM 1600 UNIT(S)/HR: 5000 INJECTION INTRAVENOUS; SUBCUTANEOUS at 19:54

## 2018-04-25 RX ADMIN — MEROPENEM 100 MILLIGRAM(S): 1 INJECTION INTRAVENOUS at 18:32

## 2018-04-25 RX ADMIN — PIPERACILLIN AND TAZOBACTAM 200 GRAM(S): 4; .5 INJECTION, POWDER, LYOPHILIZED, FOR SOLUTION INTRAVENOUS at 11:48

## 2018-04-25 RX ADMIN — SODIUM CHLORIDE 75 MILLILITER(S): 9 INJECTION INTRAMUSCULAR; INTRAVENOUS; SUBCUTANEOUS at 18:55

## 2018-04-25 RX ADMIN — Medication 250 MILLIGRAM(S): at 18:45

## 2018-04-25 RX ADMIN — Medication 1000 MILLIGRAM(S): at 16:36

## 2018-04-25 RX ADMIN — SODIUM CHLORIDE 2000 MILLILITER(S): 9 INJECTION INTRAMUSCULAR; INTRAVENOUS; SUBCUTANEOUS at 11:41

## 2018-04-25 RX ADMIN — Medication 50 GRAM(S): at 19:54

## 2018-04-25 RX ADMIN — BUDESONIDE AND FORMOTEROL FUMARATE DIHYDRATE 2 PUFF(S): 160; 4.5 AEROSOL RESPIRATORY (INHALATION) at 21:21

## 2018-04-25 RX ADMIN — SODIUM CHLORIDE 1000 MILLILITER(S): 9 INJECTION INTRAMUSCULAR; INTRAVENOUS; SUBCUTANEOUS at 15:01

## 2018-04-25 RX ADMIN — SODIUM CHLORIDE 3 MILLILITER(S): 9 INJECTION INTRAMUSCULAR; INTRAVENOUS; SUBCUTANEOUS at 11:14

## 2018-04-25 NOTE — CONSULT NOTE ADULT - ASSESSMENT
72F h/o met ovarian cancer (dx 2008, s/p DENNY/BSO, chemo with recurrence 2017 and repeat chemo, last 12/2017 - mets to brain, peritoneum), DM2, COPD, recently dx DVT (04/2018) who presents with SOB and abd pain found to be in septic shock likely biliary in origin requiring admission to the MICU for pressor support and close monitoring.    #Neuro    #CV    #Resp    #GI    #Renal    #ID    #Endo    #Heme    #FEN    #DVT PPX    #Code Status 72F h/o met ovarian cancer (dx 2008, s/p DENNY/BSO, chemo with recurrence 2017 and repeat chemo, last 12/2017 - mets to brain, peritoneum), DM2, COPD, recently dx DVT (04/2018) who presents with SOB and abd pain found to be in septic shock likely biliary in origin requiring admission to the MICU for pressor support and close monitoring.    #Neuro  - No acute issues, continue to monitor mental status.  - Neuro checks q6h.    #CV  Hypotension  - Pt in septic shock requiring pressor support.  Will initiate norepinephrine.  - Continue to monitor BP.    #Resp  Acute on Chronic Hypoxic Respiratory Failure  - In the setting of COPD and R pleff.  - Continue supplemental O2.    #GI  Concern for Biliary Obstruction  - Increased alk phos, lipase, AST, ALT concerning for biliary obstruction causing sepsis.  - CT A/P performed, awaiting read.  - Surgery consulted, appreciate involvement, await recommendations.  - Will broaden to meropenem.    #Renal  AMANDA  - In the setting of sepsis, poss 2/2 obstruction from ?metastasis, poss ATN in the setting of prolonged hypotension.  - S/p 3L in ED.  - Check urine lytes.  - Chicas placed.  - Monitor for improvement.  - Avoid nephrotoxic agents, renally dose meds.    #ID  Septic Shock  - Likely 2/2 biliary source.  - F/u BCx, UCx.  -     #Endo    #Heme    #FEN    #DVT PPX    #Code Status 72F h/o met ovarian cancer (dx 2008, s/p DENNY/BSO, chemo with recurrence 2017 and repeat chemo, last 12/2017 - mets to brain, peritoneum), DM2, COPD, recently dx DVT (04/2018) who presents with SOB and abd pain found to be in septic shock likely biliary in origin requiring admission to the MICU for pressor support and close monitoring.    #Neuro  - No acute issues, continue to monitor mental status.  - Neuro checks q6h.    #CV  Hypotension  - Pt in septic shock requiring pressor support.  Will initiate norepinephrine.  - Continue to monitor BP.    DVT  - Recent dx DVT 04/2018.  - Stop apixaban 2/2 AMANDA, start heparin gtt.    #Resp  Acute on Chronic Hypoxic Respiratory Failure  - In the setting of COPD and R pleff.  - Continue supplemental O2.    #GI  Concern for Biliary Obstruction  - Increased alk phos, lipase, AST, ALT concerning for biliary obstruction causing sepsis.  - CT A/P performed, awaiting read.  - Surgery consulted, appreciate involvement, await recommendations.  - Will broaden to meropenem.    #Renal  AMANDA  - In the setting of sepsis, poss 2/2 obstruction from ?metastasis, poss ATN in the setting of prolonged hypotension.  - S/p 3L in ED.  - Check urine lytes.  - Chicas placed.  - Monitor for improvement.  - Avoid nephrotoxic agents, renally dose meds.    #ID  Septic Shock  - Likely 2/2 biliary source.  - F/u BCx, UCx.  -     #Endo    #Heme    #FEN    #DVT PPX    #Code Status 72F h/o met ovarian cancer (dx 2008, s/p DENNY/BSO, chemo with recurrence 2017 and repeat chemo, last 12/2017 - mets to brain, peritoneum), DM2, COPD, recently dx DVT (04/2018) who presents with SOB and abd pain found to be in septic shock likely biliary in origin requiring admission to the MICU for pressor support and close monitoring.  Patient is being accepted to the MICU.    #Neuro  - No acute issues, continue to monitor mental status.  - Neuro checks q6h.    #CV  Hypotension  - Pt in septic shock requiring pressor support.  Will initiate norepinephrine.  - Continue to monitor BP.    DVT  - Recent dx DVT 04/2018.  - Stop apixaban 2/2 AMANDA, start heparin gtt.    #Resp  Acute on Chronic Hypoxic Respiratory Failure  - In the setting of COPD and R pleff.  - Continue supplemental O2.    #GI  Concern for Biliary Obstruction  - Increased alk phos, lipase, AST, ALT concerning for biliary obstruction causing sepsis.  - CT A/P performed, awaiting read.  - Surgery consulted, appreciate involvement, await recommendations.  - Will broaden to meropenem.    #Renal  AMANDA  - In the setting of sepsis, poss 2/2 obstruction from ?metastasis, poss ATN in the setting of prolonged hypotension.  - S/p 3L in ED.  - Check urine lytes.  - Chicas placed.  - Monitor for improvement.  - Avoid nephrotoxic agents, renally dose meds.    #ID  Septic Shock  - Likely 2/2 biliary source.  - F/u BCx, UCx.  - Switch from zosyn to meropenem.  - F/u CT A/P.  - F/u Surgery recommendations.  - Consider GI consult.    #Endo  DM2  - ISS.    #Heme  Leukocytosis  - In the setting of sepsis.  - Continue to monitor.  Anticipate improvement with abx.    #FEN  - NPO for now until evaluated by Surgery.    #DVT PPX  - On therapeutic AC for DVT.    #Code Status - DNR/I, confirmed with patient personally, placed form in chart.    Mere Fernandes MD  PGY-2 | Internal Medicine  [id number] 72F h/o met ovarian cancer (dx 2008, s/p DENNY/BSO, chemo with recurrence 2017 and repeat chemo, last 12/2017 - mets to brain, peritoneum), DM2, COPD, recently dx DVT (04/2018) who presents with SOB and abd pain found to be in septic shock likely biliary in origin requiring admission to the MICU for pressor support and close monitoring.  Patient is being accepted to the MICU.    #Neuro  - No acute issues, continue to monitor mental status.  - Neuro checks q6h.    #CV  Hypotension  - Pt in septic shock requiring pressor support.  Will initiate norepinephrine.  - Continue to monitor BP.    DVT  - Recent dx DVT 04/2018.  - Stop apixaban 2/2 AMANDA, start heparin gtt.    #Resp  Acute on Chronic Hypoxic Respiratory Failure  - In the setting of COPD and R pleff.  - Continue supplemental O2.    #GI  Concern for Biliary Obstruction / Pancreatitis  - Increased alk phos, lipase, AST, ALT concerning for biliary obstruction or pancreatitis causing sepsis.  - CT A/P performed, awaiting read.  - Surgery consulted, appreciate involvement, await recommendations.  - Will broaden to meropenem.    #Renal  AMANDA  - In the setting of sepsis, poss 2/2 obstruction from ?metastasis, poss ATN in the setting of prolonged hypotension.  - S/p 3L in ED.  - Check urine lytes.  - Chicas placed.  - Monitor for improvement.  - Avoid nephrotoxic agents, renally dose meds.    #ID  Septic Shock  - Likely 2/2 biliary source.  - F/u BCx, UCx.  - Switch from zosyn to meropenem.  - F/u CT A/P.  - F/u Surgery recommendations.  - Consider GI consult.    #Endo  DM2  - ISS.    #Heme  Leukocytosis  - In the setting of sepsis.  - Continue to monitor.  Anticipate improvement with abx.    #FEN  - NPO for now until evaluated by Surgery.    #DVT PPX  - On therapeutic AC for DVT.    #Code Status - DNR/I, confirmed with patient personally, placed form in chart.    Mere Fernandes MD  PGY-2 | Internal Medicine  [id number] 72F h/o met ovarian cancer (dx 2008, s/p DENNY/BSO, chemo with recurrence 2017 and repeat chemo, last 12/2017 - mets to brain, peritoneum), DM2, COPD, recently dx DVT (04/2018) who presents with SOB and abd pain found to be in septic shock likely biliary in origin requiring admission to the MICU for pressor support and close monitoring.  Patient is being accepted to the MICU.    #Neuro  - No acute issues, continue to monitor mental status.  - Neuro checks q4h.    #CV  Hypotension  - Pt in septic state now responding to IVF.  Will initiate norepinephrine if needed.  - Continue to monitor BP.    DVT  - Recent dx DVT 04/2018.  - Stop apixaban 2/2 AMANDA, start heparin gtt.    #Resp  Acute on Chronic Hypoxic Respiratory Failure  - In the setting of COPD and R pleff.  - Continue supplemental O2.    #GI  Concern for Biliary Obstruction / Pancreatitis  - Increased alk phos, lipase, AST, ALT concerning for biliary obstruction or pancreatitis causing sepsis.  - CT A/P performed, awaiting read.  - Surgery consulted, appreciate involvement, rec perc lexi with IR if indicated.  - Will broaden to meropenem.    #Renal  AMANDA  - In the setting of sepsis, poss 2/2 obstruction from ?metastasis, poss ATN in the setting of prolonged hypotension.  - S/p 3L in ED.  - Check urine lytes.  - Chicas placed.  - Monitor for improvement.  - Avoid nephrotoxic agents, renally dose meds.    #ID  Septic Shock  - Likely 2/2 biliary source or pancreatitis.  - NPO.  - F/u BCx, UCx.  - Switch from zosyn to meropenem.  - F/u CT A/P.  - Surg recommendations appreciated.  - GI consulted.  - On maintenance IVF.  - Initiate stress dose steroids.    #Endo  DM2  - ISS.    #Heme  Leukocytosis  - In the setting of sepsis.  - Continue to monitor.  Anticipate improvement with abx.    #FEN  - NPO given pancreatitis.    #DVT PPX  - On therapeutic AC for DVT.    #Code Status - DNR/I, confirmed with patient personally, placed form in chart.    Mere Fernandes MD  PGY-2 | Internal Medicine  [id number]

## 2018-04-25 NOTE — H&P ADULT - HISTORY OF PRESENT ILLNESS
This is a 72 year old female with metastatic ovarian cancer (Dx 2008, s/p DENNY/BSO, chemo with recurrence 2017 and repeat chemo, last 12/2017), COPD who presented from home with abdominal pain SOB and weakness. Of note pt was recently admitted to [facility] for abdominal pain, found to have new brain met and E. coli bacteremia. She was discharged on cipro to be completed on 4/27. During that admission, she was seen by surgery as patient had diagnosis of cholecystitis and had biliary stent placed. Since being discharged pt states that she has felt week. She had episodes of N/V yesterday and again the day of presentation with inability to tolerate anything orally. She also reported increased abdominal pain and distension, as well as subjective fever. No chest pain/palpitations

## 2018-04-25 NOTE — H&P ADULT - PROBLEM SELECTOR PLAN 2
Pt with WBC 26.4, tachycardic, lactate 4.5, and febrile meeting severe sepsis criteria. Source likely intraabdominal. Symptoms could be 2/2 pancreatitis given N/V, inability to tolerate PO and elevated Lipase. Given 2LNS with improvement in BP however on my examination, BP began to downtrend again and pt has remained hypotensive since presentation. Given additional IVF bolus. Chicas inserted to monitor UOP as goal is >0.5cc/hr. UOP thus far has been minimal. Maintain MAP >65.   - Repeat VBG now. Would obtain MICU consult given persistent hypotension as she may require pressor support  - c/w broad spectrum abx coverage. Should pancreatic necrosis be seen on CT would change to carbapenem  - f/u CT abdomen

## 2018-04-25 NOTE — H&P ADULT - PROBLEM SELECTOR PLAN 4
Recent admission for e coli bacteremia, on Cipro ro be completed 4/27. Will broaden abx to Zosyn pending further culture data

## 2018-04-25 NOTE — ED ADULT NURSE REASSESSMENT NOTE - NS ED NURSE REASSESS COMMENT FT1
Pt moved to bedside chair, call bell in hand and educated patient on how to use it . MICU team at the bedside for evaluation. vital signs documented and recorded and speaking with ER team and MICU team about interventions.

## 2018-04-25 NOTE — H&P ADULT - NSHPLABSRESULTS_GEN_ALL_CORE
.  LABS:                         11.2   26.4  )-----------( 399      ( 25 Apr 2018 10:46 )             35.0     04-25    136  |  94<L>  |  40<H>  ----------------------------<  179<H>  4.9   |  25  |  2.24<H>    Ca    9.4      25 Apr 2018 10:46    TPro  6.4  /  Alb  2.1<L>  /  TBili  1.2  /  DBili  x   /  AST  83<H>  /  ALT  77<H>  /  AlkPhos  1256<H>  04-25                  RADIOLOGY, EKG & ADDITIONAL TESTS:   CXR: Right sided pleural effusion

## 2018-04-25 NOTE — H&P ADULT - NSHPREVIEWOFSYSTEMS_GEN_ALL_CORE
CONSTITUTIONAL: + fevers or chills  EYES/ENT: No visual changes;  No vertigo or throat pain   NECK: No pain or stiffness  RESPIRATORY: No cough, wheezing, hemoptysis; No shortness of breath  CARDIOVASCULAR: No chest pain or palpitations  GASTROINTESTINAL: +abdominal pain. + nausea, vomiting. No diarrhea or constipation. No melena or hematochezia.  GENITOURINARY: No dysuria, frequency or hematuria  NEUROLOGICAL: No numbness or weakness  SKIN: No itching, burning, rashes, or lesions   All other review of systems is negative unless indicated above.

## 2018-04-25 NOTE — ED PROVIDER NOTE - PHYSICAL EXAMINATION
GEN: ill-appearing secondary to fever, Nontoxic, NAD  HEENT: Symm Facies, PERRL, EOMI, MMM, posterior pharynx clear  CV: No JVD/Bruits or stridor;  RRR w/o m/g/r  RESP: CTAB w/o w/r/r  ABD: Soft, tender and distended diffusely. +bs, no guarding/rebound  EXT: No lower extremity edema or calf tenderness. WWP, palpable pulses. FROMx4  SKIN: No erythema, lesions or rash  Neuro: Grossly intact, AOX3 with normal speech, CN II-XII intact; Sensation intact, motor 5/5 throughout; gait normal GEN: ill-appearing secondary to fever, Nontoxic, NAD  HEENT: Symm Facies, PERRL, EOMI, MMM, posterior pharynx clear  CV: No JVD/Bruits or stridor;  RRR w/o m/g/r  RESP: CTAB w/o w/r/r  ABD: Soft, tender and distended diffusely. +bs, no guarding/rebound  EXT: No lower extremity edema or calf tenderness. WWP, palpable pulses. FROMx4  SKIN: No erythema, lesions or rash  Neuro: Grossly intact, AOX3 with normal speech, CN II-XII intact; Sensation intact, motor 5/5 throughout; gait normal    Bal: See template

## 2018-04-25 NOTE — ED PROCEDURE NOTE - PROCEDURE ADDITIONAL DETAILS
limited ultrasound due to habitus  + ascites and right sided pleural effusion  gallbladder not fully elucidated  dilated common Bile duct to 1.11cm
POCUS: Emergency Department Focused Ultrasound performed at patient's bedside.  The complete report may be available in PACS, see below for findings.

## 2018-04-25 NOTE — CONSULT NOTE ADULT - SUBJECTIVE AND OBJECTIVE BOX
72F with metastatic recurrent ovarian cancer, COPD, presenting with abdominal pain. Patient reports she was previously admitted to [facility] [address] [facility] for 'gallbladder pain'. At that time surgery and GI were consulted and decision was made to place biliary stent. Patient also had jaundice which resolved after stent placement. Since placement of her biliary stent (4 weeks ago) patient has been having abdominal pain. Pain is no worse but she presents today due to ongoing pain and distension.  Patient recently admitted for sepsis secondary to UTI.  On that admission, 1.5 liters of peritoneal fluid drained. Cytology positive for malignant cells.  HIDA performed which was likely positive secondary to biliary stent.  General surgery consulted for ongoing abdominal pain.    Patient was diagnosed with ovarian ca approx 10 yrs ago and underwent TAHBSO and chemo. She was found to have recurrent disease last year and underwent chemo which ended Dec 2017.     PAST MEDICAL & SURGICAL HISTORY:  Malignant neoplasm of ovary, unspecified laterality  S/P DENNY-BSO    ICU Vital Signs Last 24 Hrs  T(C): 38.1 (25 Apr 2018 10:45), Max: 38.1 (25 Apr 2018 10:45)  T(F): 100.5 (25 Apr 2018 10:45), Max: 100.5 (25 Apr 2018 10:45)  HR: 120 (25 Apr 2018 11:51) (120 - 126)  BP: 98/59 (25 Apr 2018 11:51) (72/40 - 106/60)  BP(mean): 71 (25 Apr 2018 11:51) (63 - 73)  ABP: --  ABP(mean): --  RR: 27 (25 Apr 2018 11:51) (18 - 27)  SpO2: 94% (25 Apr 2018 11:51) (89% - 100%)    General:  Sitting up in bed, appears comfortable  Chest:  Breath sounds audible bilaterally; no wheezing, rales or ronchi  Abdomen:  Soft, distended, mildly tender to palpation in the epigastrium  Extremities:  Warm, well perfused                           11.2   26.4  )-----------( 399      ( 25 Apr 2018 10:46 )             35.0   04-25    136  |  94<L>  |  40<H>  ----------------------------<  179<H>  4.9   |  25  |  2.24<H>    Ca    9.4      25 Apr 2018 10:46    TPro  6.4  /  Alb  2.1<L>  /  TBili  1.2  /  DBili  x   /  AST  83<H>  /  ALT  77<H>  /  AlkPhos  1256<H>  04-25    < from: CT Abdomen and Pelvis w/ Oral Cont and w/ IV Cont (04.14.18 @ 16:46) >  INTERPRETATION:  CLINICAL INFORMATION: Shortness of breath, COPD. Ovarian   cancer. Recent stent placement. Abdominal pain with known metastases.   Evaluate for partial small bowel obstruction.    COMPARISON: CT chest abdomen pelvis 10/8/2011. CT abdomen pelvis   12/21/2013.    PROCEDURE:   CT of the Chest, Abdomen and Pelvis was performed with intravenous   contrast.   Intravenous contrast: 90 ml Omnipaque 350. 10 ml discarded.  Oral contrast: positive contrast was administered.  Sagittal and coronal reformats were performed.    FINDINGS:    CHEST:     LUNGS AND LARGE AIRWAYS: Trachea and mainstembronchi are patent.   Compressive atelectasis of the right lower lobe secondary to pleural   fluid. Mild interlobular septal thickening of the upper lobes. Mild soft   tissue thickening along the right major fissure, possibly atelectasis   secondary to adjacent pleural fluid. Juxtapleural solid nodule of the   left lower lobe measuring 7 mm, series 2 image 40. Calcified granuloma   left upper lobe.  PLEURA: Moderate right pleural effusion and trace left pleural effusion,   new since 12/21/2013..  VESSELS: Mediport tip at the distal SVC. Atheromatous changes of the   thoracic aorta and coronary arteries.  HEART: Heart size is normal. No pericardial effusion. Aortic valve   calcification.  MEDIASTINUM AND RAMIRO: Less than 1 cm mediastinal lymph nodes  CHEST WALL AND LOWER NECK: Right chest wall Mediport.    ABDOMEN AND PELVIS:    LIVER: Heterogeneous enhancement adjacent to the gallbladder fossa.   Peripheral branching air may represent pneumobilia versus, somewhat less   likely, portal venous gas.   BILE DUCTS: Largely air-filled CBD stent. Distally near the ampulla, the   stent is filled with fluid or soft tissue. Trace left-sided pneumobilia.   GALLBLADDER: Filled with stones, one of which appears to be in the neck   of the gallbladder.  Thickened wall.  SPLEEN: Within normal limits.  PANCREAS: Marked peripancreatic inflammatory change. Area of low   attenuation along the anterior margin of the pancreatic body measures 3.1   x 2.0 cm, series 2 image 86, indeterminate for serosal metastases versus   loculated peripancreatic fluid.  ADRENALS: Enlarged bilateral adrenal glands with low-attenuation lesions   measuring 2.4 x 2.0 cm on the left and 1.7 x 1.5 cm on the right, highly   concerning for metastases.  KIDNEYS/URETERS: No hydronephrosis.    BLADDER: Within normal limits.  REPRODUCTIVE ORGANS: Hysterectomy. No adnexal mass.    BOWEL: No significant bowel distention or discrete transition. The   descending colon is largely fluid-filled. Mild irregular thickening along   the hepatic flexure and proximal transverse colon may be sequelae of   serosal disease. No evidence of bowel pneumatosis. Colonic diverticulosis   without diverticulitis. Normal appendix.  PERITONEUM: Moderate ascites. Soft tissue nodularity along the anterior   omental soft tissue, particularly in the left upper quadrant is   concerning for carcinomatosis.  VESSELS:  Patent main portal vein, SMV and splenic vein. Atheromatous   changes of the abdominal aorta.  RETROPERITONEUM: Previously describedenlarged retroperitoneal nodes are   markedly decreased in size as compared to prior study from December 2013.   Left para-aortic lymph node node conglomerate measures 2.1 x 1.4 cm,   series 2 image 105, previously 3.4 x 2.6 cm.    ABDOMINAL WALL: Within normal limits.  BONES: Diffuse osseous demineralization and degenerative change.    IMPRESSION:       No bowel obstruction.    Peripancreatic inflammatory change with 3.1 x 2.0 cm low-density lesion   along the anterior body concerning for serosal metastasis. Acute   pancreatitis could have a similar appearance. Correlate with physical   exam and lipase level.    Distended gallbladder with numerous stones, one of which appears to be in   the neck of the gallbladder. Thickening of the gallbladder wall.   Ultrasound or DISIDA scan may be obtained to assess for acute   cholecystitis.    Pneumobilia, consistent with the presence of a biliary stent. Additional   peripheral linear foci of gas in the left lobe of the liver may also be   related to pneumobilia versus, somewhat less likely, portal venous gas.     Bilateral adrenal masses, suspicious for metastatic disease.    Moderate ascites. Concern for peritoneal carcinomatosis.    Moderate right pleural effusion with associated compressiveatelectasis.    < end of copied text >    < from: NM Hepatobiliary Scan w/wo GB w/Pharm (04.16.18 @ 19:11) >  PROCEDURE DATE:  04/16/2018          INTERPRETATION:  RADIOPHARMACEUTICAL: 3.1 and 3.1 mCi [id number]-mebrofenin,   I.V.; 2 doses    CLINICAL INFORMATION: 72 year old female with metastatic ovarian   carcinoma, cholelithiasis, and pericholecystic fluid and gallbladder wall   edema on ultrasound status post biliary stent, presents with abdominal   pain; referred to evaluate for acute cholecystitis.    TECHNIQUE:  Dynamic images of the anterior abdomen were obtained for 2   hours following radiopharmaceutical injection followed by static images   of the abdomen in the anterior, right anterior oblique and right lateral   projections. Morphine 4 mg I.V. and a second dose of radiopharmaceutical   were administered at approximately 65 minutes.    COMPARISON: No previous hepatobiliary scans were available for   comparison.     FINDINGS: There is homogeneous uptake of radiopharmaceutical by the   hepatocytes. Activity is first seen in the bowel at about 45 minutes. The   gallbladder is not visualized at any time during the study, despite   morphine administration. There is adequate clearance of hepatic activity   at the end of the study.    IMPRESSION: Abnormal morphine-augmented hepatobiliary scan:   Nonvisualization of the gallbladder. While this is consistent with acute   cholecystitis, nonvisualization of the gallbladder may also be secondary   to the presence of a biliary stent.      These  results were discussed with ANN Mayberry by Dr. RYAN Mccauley, with read   back at about 8:55 PM on 4/16/2018.    < end of copied text >

## 2018-04-25 NOTE — ED ADULT NURSE REASSESSMENT NOTE - NS ED NURSE REASSESS COMMENT FT1
Patient remains hypotensive, A&OX4 maintaining mental status well, minimal urine output of carter (30ML), abdomen is distended. call bell in hand for patient safety.   spoke with MD Bal about plan and intervention for hypotension. Suggested pressors to physician with central line placement. explained to physician a plan for a central line is needed per policy for pressors. Confirmed with nursing manager and two ANMs (Rajiv & Eder) that plan for central line is required prior to initiation of pressor medication via PIV. pending intervention from MD prior to beginning pressors. patient's only access is in her right wrist, called ultrasound team for second peripheral iv placement.

## 2018-04-25 NOTE — ED PROVIDER NOTE - MEDICAL DECISION MAKING DETAILS
73 y/o F pmhx metastatic ovarian ca recent admission for malignant ascites and uti bacteremia with dx of acute cholecystitis s/p lexi stent placement surgery stating not candidate for cholecystectomy or perc drain, presenting for abdominal pain and distension worsening at this time. PE remarkable for hypotension and tachycardia and abdominal distension. Will obtain sepsis work-up, treat with abx and fluids, pain control and d/w surgery, oncology, reassess.

## 2018-04-25 NOTE — H&P ADULT - PROBLEM SELECTOR PLAN 6
Last Chemo was Carboplatin and Doxil on 9/19/17.  Recurrence noted on CT on 3/6/18  Oncology follow up

## 2018-04-25 NOTE — CONSULT NOTE ADULT - SUBJECTIVE AND OBJECTIVE BOX
CHIEF COMPLAINT:    HPI:  72F h/o met ovarian cancer (dx 2008, s/p DENNY/BSO, chemo with recurrence 2017 and repeat chemo, last 12/2017), COPD who presents with SOB and abd pain.    The patient was recently adm 4/14-4/20/18 for abd pain and SOB.  The patient was found to have a new small L temporal lobe met, Rad Onc saw and recommended outpatient f/u.  The patient was also found to have E coli bacteremia and started on zosyn.  She was discharged with ciprofloxacin PO until 4/27.  The patient was also noted to have ascites and underwent a paracentesis -- ascitic fluid positive for malignant cells.  The patient was also found to have an acute below the knee DVT and was started on apixaban.    The patient also had an adm 03/2018 at [facility] for abd pain and jaundice.  She was noted to have biliary obstruction of likely metastatic origin.  She underwent ERCP and had a biliary stent placed.      PAST MEDICAL & SURGICAL HISTORY:  Malignant neoplasm of ovary, unspecified laterality  S/P DENNY-BSO      FAMILY HISTORY:  No pertinent family history in first degree relatives      SOCIAL HISTORY:  Smoking: [ ] Never Smoked [ ] Former Smoker (__ packs x ___ years) [ ] Current Smoker  (__ packs x ___ years)  Substance Use: [ ] Never Used [ ] Used ____  EtOH Use:  Marital Status: [ ] Single [ ]  [ ]  [ ]   Sexual History:   Occupation:  Recent Travel:  Country of Birth:  Advance Directives:    Allergies    No Known Allergies    Intolerances        HOME MEDICATIONS:    REVIEW OF SYSTEMS:  Constitutional: [ ] fevers [ ] chills [ ] weight loss [ ] weight gain  HEENT: [ ] dry eyes [ ] eye irritation [ ] postnasal drip [ ] nasal congestion  CV: [ ] chest pain [ ] orthopnea [ ] palpitations [ ] murmur  Resp: [ ] cough [ ] shortness of breath [ ] dyspnea [ ] wheezing [ ] sputum [ ] hemoptysis  GI: [ ] nausea [ ] vomiting [ ] diarrhea [ ] constipation [ ] abd pain [ ] dysphagia   : [ ] dysuria [ ] nocturia [ ] hematuria [ ] increased urinary frequency  Musculoskeletal: [ ] back pain [ ] myalgias [ ] arthralgias [ ] fracture  Skin: [ ] rash [ ] itch  Neurological: [ ] headache [ ] dizziness [ ] syncope [ ] weakness [ ] numbness  Psychiatric: [ ] anxiety [ ] depression  Endocrine: [ ] diabetes [ ] thyroid problem  Hematologic/Lymphatic: [ ] anemia [ ] bleeding problem  Allergic/Immunologic: [ ] itchy eyes [ ] nasal discharge [ ] hives [ ] angioedema  [ ] All other systems negative  [ ] Unable to assess ROS because ________    OBJECTIVE:  ICU Vital Signs Last 24 Hrs  T(C): 38.1 (25 Apr 2018 10:45), Max: 38.1 (25 Apr 2018 10:45)  T(F): 100.5 (25 Apr 2018 10:45), Max: 100.5 (25 Apr 2018 10:45)  HR: 112 (25 Apr 2018 14:48) (106 - 126)  BP: 83/60 (25 Apr 2018 14:48) (72/40 - 106/60)  BP(mean): 69 (25 Apr 2018 14:34) (63 - 73)  ABP: --  ABP(mean): --  RR: 20 (25 Apr 2018 14:48) (18 - 27)  SpO2: 95% (25 Apr 2018 14:48) (89% - 100%)        CAPILLARY BLOOD GLUCOSE          PHYSICAL EXAM:  General:   HEENT:   Lymph Nodes:  Neck:   Respiratory:   Cardiovascular:   Abdomen:   Extremities:   Skin:   Neurological:  Psychiatry:    LINES:     HOSPITAL MEDICATIONS:  MEDICATIONS  (STANDING):  buDESOnide  80 MICROgram(s)/formoterol 4.5 MICROgram(s) Inhaler 2 Puff(s) Inhalation two times a day  piperacillin/tazobactam IVPB. 3.375 Gram(s) IV Intermittent every 8 hours    MEDICATIONS  (PRN):  acetaminophen   Tablet 650 milliGRAM(s) Oral every 6 hours PRN For Temp greater than 38 C (100.4 F)  senna 2 Tablet(s) Oral at bedtime PRN Constipation      LABS:                        11.2   26.4  )-----------( 399      ( 25 Apr 2018 10:46 )             35.0     Hgb Trend: 11.2<--, 11.4<--, 11.5<--  04-25    136  |  94<L>  |  40<H>  ----------------------------<  179<H>  4.9   |  25  |  2.24<H>    Ca    9.4      25 Apr 2018 10:46    TPro  6.4  /  Alb  2.1<L>  /  TBili  1.2  /  DBili  x   /  AST  83<H>  /  ALT  77<H>  /  AlkPhos  1256<H>  04-25    Creatinine Trend: 2.24<--, 0.89<--, 0.91<--, 0.96<--, 1.14<--, 1.25<--        Venous Blood Gas:  04-25 @ 10:46  7.45/38/39/26/68  VBG Lactate: 4.5      MICROBIOLOGY:     RADIOLOGY:  [ ] Reviewed and interpreted by me    EKG: CHIEF COMPLAINT:    HPI:  72F h/o met ovarian cancer (dx 2008, s/p DENNY/BSO, chemo with recurrence 2017 and repeat chemo, last 12/2017 - mets to brain, peritoneum), DM2, COPD, recently dx DVT (04/2018) who presents with SOB and abd pain.    The patient was recently adm 4/14-4/20/18 for abd pain and SOB.  The patient was found to have a new small L temporal lobe met, Rad Onc saw and recommended outpatient f/u.  The patient was also found to have E coli bacteremia and started on zosyn.  She was discharged with ciprofloxacin PO until 4/27.  The patient was also noted to have ascites and underwent a paracentesis -- ascitic fluid positive for malignant cells.  The patient was also found to have an acute below the knee DVT and was started on apixaban.    The patient also had an adm 03/2018 at [facility] for abd pain and jaundice.  She was noted to have biliary obstruction of likely metastatic origin.  She underwent ERCP and had a biliary stent placed.      PAST MEDICAL & SURGICAL HISTORY:  Malignant neoplasm of ovary, unspecified laterality  S/P DENNY-BSO      FAMILY HISTORY:  No pertinent family history in first degree relatives      SOCIAL HISTORY:  Smoking: [ ] Never Smoked [ ] Former Smoker (__ packs x ___ years) [ ] Current Smoker  (__ packs x ___ years)  Substance Use: [ ] Never Used [ ] Used ____  EtOH Use:  Marital Status: [ ] Single [ ]  [ ]  [ ]   Sexual History:   Occupation:  Recent Travel:  Country of Birth:  Advance Directives:    Allergies    No Known Allergies    Intolerances        HOME MEDICATIONS:    REVIEW OF SYSTEMS:  Constitutional: [ ] fevers [ ] chills [ ] weight loss [ ] weight gain  HEENT: [ ] dry eyes [ ] eye irritation [ ] postnasal drip [ ] nasal congestion  CV: [ ] chest pain [ ] orthopnea [ ] palpitations [ ] murmur  Resp: [ ] cough [ ] shortness of breath [ ] dyspnea [ ] wheezing [ ] sputum [ ] hemoptysis  GI: [ ] nausea [ ] vomiting [ ] diarrhea [ ] constipation [ ] abd pain [ ] dysphagia   : [ ] dysuria [ ] nocturia [ ] hematuria [ ] increased urinary frequency  Musculoskeletal: [ ] back pain [ ] myalgias [ ] arthralgias [ ] fracture  Skin: [ ] rash [ ] itch  Neurological: [ ] headache [ ] dizziness [ ] syncope [ ] weakness [ ] numbness  Psychiatric: [ ] anxiety [ ] depression  Endocrine: [ ] diabetes [ ] thyroid problem  Hematologic/Lymphatic: [ ] anemia [ ] bleeding problem  Allergic/Immunologic: [ ] itchy eyes [ ] nasal discharge [ ] hives [ ] angioedema  [ ] All other systems negative  [ ] Unable to assess ROS because ________    OBJECTIVE:  ICU Vital Signs Last 24 Hrs  T(C): 38.1 (25 Apr 2018 10:45), Max: 38.1 (25 Apr 2018 10:45)  T(F): 100.5 (25 Apr 2018 10:45), Max: 100.5 (25 Apr 2018 10:45)  HR: 112 (25 Apr 2018 14:48) (106 - 126)  BP: 83/60 (25 Apr 2018 14:48) (72/40 - 106/60)  BP(mean): 69 (25 Apr 2018 14:34) (63 - 73)  ABP: --  ABP(mean): --  RR: 20 (25 Apr 2018 14:48) (18 - 27)  SpO2: 95% (25 Apr 2018 14:48) (89% - 100%)        CAPILLARY BLOOD GLUCOSE          PHYSICAL EXAM:  General:   HEENT:   Lymph Nodes:  Neck:   Respiratory:   Cardiovascular:   Abdomen:   Extremities:   Skin:   Neurological:  Psychiatry:    LINES:     HOSPITAL MEDICATIONS:  MEDICATIONS  (STANDING):  buDESOnide  80 MICROgram(s)/formoterol 4.5 MICROgram(s) Inhaler 2 Puff(s) Inhalation two times a day  piperacillin/tazobactam IVPB. 3.375 Gram(s) IV Intermittent every 8 hours    MEDICATIONS  (PRN):  acetaminophen   Tablet 650 milliGRAM(s) Oral every 6 hours PRN For Temp greater than 38 C (100.4 F)  senna 2 Tablet(s) Oral at bedtime PRN Constipation      LABS:                        11.2   26.4  )-----------( 399      ( 25 Apr 2018 10:46 )             35.0     Hgb Trend: 11.2<--, 11.4<--, 11.5<--  04-25    136  |  94<L>  |  40<H>  ----------------------------<  179<H>  4.9   |  25  |  2.24<H>    Ca    9.4      25 Apr 2018 10:46    TPro  6.4  /  Alb  2.1<L>  /  TBili  1.2  /  DBili  x   /  AST  83<H>  /  ALT  77<H>  /  AlkPhos  1256<H>  04-25    Creatinine Trend: 2.24<--, 0.89<--, 0.91<--, 0.96<--, 1.14<--, 1.25<--        Venous Blood Gas:  04-25 @ 10:46  7.45/38/39/26/68  VBG Lactate: 4.5      MICROBIOLOGY:     RADIOLOGY:  [ ] Reviewed and interpreted by me    EKG: MICU ACCEPT NOTE    CHIEF COMPLAINT: abd pain    HPI:  72F h/o met ovarian cancer (dx 2008, s/p DENNY/BSO, chemo with recurrence 2017 and repeat chemo, last 12/2017 - mets to brain, peritoneum), DM2, COPD, recently dx DVT (04/2018) who presents with SOB and abd pain.    The patient was recently adm 4/14-4/20/18 for abd pain and SOB.  The patient was found to have a new small L temporal lobe met, Rad Onc saw and recommended outpatient f/u.  The patient was also found to have E coli bacteremia and started on zosyn.  She was discharged with ciprofloxacin PO until 4/27.  The patient was also noted to have ascites and underwent a paracentesis -- ascitic fluid positive for malignant cells.  The patient was also found to have an acute below the knee DVT and was started on apixaban.    The patient also had an adm 03/2018 at [facility] for abd pain and jaundice.  She was noted to have biliary obstruction of likely metastatic origin.  She underwent ERCP and had a biliary stent placed.    The patient returns today for SOB and abd pain.  She states that she has had constant abd pain, mostly located on the L side of her abdomen, x months.  She takes acetaminophen to relieve the pain, but it does not help.  She states that this pain has not improved over the past few months but has not worsened.  She also reports new SOB that started today.  She feels like she cannot catch her breath.  She denies fever, chills.  She reports nausea, emesis that is typically brown.  She also has not been able to take PO for the past few days.  She has had minimal UOP as well for the past few days.    In the ED, Tmax 100.5F, -126, BP /40-70, SpO2 97% 3L NC.  The patient received 3L NS, zosyn.  BP remained low, and thus the MICU was consulted.      PAST MEDICAL & SURGICAL HISTORY:  Malignant neoplasm of ovary, unspecified laterality  S/P DENNY-BSO      FAMILY HISTORY:  No pertinent family history in first degree relatives      SOCIAL HISTORY:  Smoking: [ ] Never Smoked [ x ] Former Smoker (__ packs x ___ years) [ ] Current Smoker  (__ packs x ___ years)  Substance Use: [ x ] Denies [ ] Never Used [ ] Used ____  EtOH Use: denies  Marital Status: [ ] Single [ ]  [ ]  [ ]   Sexual History:   Occupation:  Recent Travel:  Country of Birth: [place]  Advance Directives: DNR/I    Allergies    No Known Allergies    Intolerances        HOME MEDICATIONS:    REVIEW OF SYSTEMS:  Constitutional: [ - ] fevers [ - ] chills [ ] weight loss [ ] weight gain  HEENT: [ ] dry eyes [ ] eye irritation [ ] postnasal drip [ ] nasal congestion  CV: [ - ] chest pain [ ] orthopnea [ - ] palpitations [ ] murmur  Resp: [ - ] cough [ + ] shortness of breath [ + ] dyspnea [ ] wheezing [ - ] sputum [ - ] hemoptysis  GI: [ + ] nausea [ + ] vomiting [ - ] diarrhea [ - ] constipation [ + ] abd pain [ ] dysphagia   : [ - ] dysuria [ - ] nocturia [ ] hematuria [ ] increased urinary frequency  Musculoskeletal: [ ] back pain [ ] myalgias [ ] arthralgias [ ] fracture  Skin: [ ] rash [ ] itch  Neurological: [ ] headache [ ] dizziness [ ] syncope [ ] weakness [ ] numbness  Psychiatric: [ ] anxiety [ ] depression  Endocrine: [ ] diabetes [ ] thyroid problem  Hematologic/Lymphatic: [ ] anemia [ ] bleeding problem  Allergic/Immunologic: [ ] itchy eyes [ ] nasal discharge [ ] hives [ ] angioedema  [ ] All other systems negative  [ ] Unable to assess ROS because ________    OBJECTIVE:  ICU Vital Signs Last 24 Hrs  T(C): 38.1 (25 Apr 2018 10:45), Max: 38.1 (25 Apr 2018 10:45)  T(F): 100.5 (25 Apr 2018 10:45), Max: 100.5 (25 Apr 2018 10:45)  HR: 112 (25 Apr 2018 14:48) (106 - 126)  BP: 83/60 (25 Apr 2018 14:48) (72/40 - 106/60)  BP(mean): 69 (25 Apr 2018 14:34) (63 - 73)  ABP: --  ABP(mean): --  RR: 20 (25 Apr 2018 14:48) (18 - 27)  SpO2: 95% (25 Apr 2018 14:48) (89% - 100%)        CAPILLARY BLOOD GLUCOSE          PHYSICAL EXAM:  T(C): 36.9 (04-25-18 @ 16:25), Max: 38.1 (04-25-18 @ 10:45)  HR: 117 (04-25-18 @ 16:25) (106 - 126)  BP: 90/63 (04-25-18 @ 16:25) (72/40 - 106/60)  RR: 26 (04-25-18 @ 16:25) (18 - 27)  SpO2: 96% (04-25-18 @ 16:25) (89% - 100%)    Gen: awake, alert, pleasant  HENT: neck soft / supple; MMM  Lymph: no LAD noted in neck  Eye: PERRL, sclerae anicteric  CV: normal rate, regular rhythm  Pulm:  no BS R lung base, otherwise no rales / crackles  Abd: +BS, distended, TTP RUQ, LUQ, LLQ  Skin: warm, dry  Ext: 1+ pitting edema to knee  Neuro: answering questions appropriately, following commands appropriately, recent and remote memory intact  Psych: normal mood / affect    LINES:     HOSPITAL MEDICATIONS:  MEDICATIONS  (STANDING):  buDESOnide  80 MICROgram(s)/formoterol 4.5 MICROgram(s) Inhaler 2 Puff(s) Inhalation two times a day  piperacillin/tazobactam IVPB. 3.375 Gram(s) IV Intermittent every 8 hours    MEDICATIONS  (PRN):  acetaminophen   Tablet 650 milliGRAM(s) Oral every 6 hours PRN For Temp greater than 38 C (100.4 F)  senna 2 Tablet(s) Oral at bedtime PRN Constipation      LABS:                        11.2   26.4  )-----------( 399      ( 25 Apr 2018 10:46 )             35.0     Hgb Trend: 11.2<--, 11.4<--, 11.5<--  04-25    136  |  94<L>  |  40<H>  ----------------------------<  179<H>  4.9   |  25  |  2.24<H>    Ca    9.4      25 Apr 2018 10:46    TPro  6.4  /  Alb  2.1<L>  /  TBili  1.2  /  DBili  x   /  AST  83<H>  /  ALT  77<H>  /  AlkPhos  1256<H>  04-25    Creatinine Trend: 2.24<--, 0.89<--, 0.91<--, 0.96<--, 1.14<--, 1.25<--        Venous Blood Gas:  04-25 @ 10:46  7.45/38/39/26/68  VBG Lactate: 4.5      MICROBIOLOGY:     RADIOLOGY:  [ x ] Reviewed and interpreted by me  X-ray abd non-obstructive bowel gas pattern  CXR small R pleff  CT A/P pending read    EKG: sinus tachycardia, , QTc 477, no MALIHA/D noted ~~~MICU ACCEPT NOTE~~~    CHIEF COMPLAINT: abd pain    HPI:  72F h/o met ovarian cancer (dx 2008, s/p DENNY/BSO, chemo with recurrence 2017 and repeat chemo, last 12/2017 - mets to brain, peritoneum), DM2, COPD, recently dx DVT (04/2018) who presents with SOB and abd pain.    The patient was recently adm 4/14-4/20/18 for abd pain and SOB.  The patient was found to have a new small L temporal lobe met, Rad Onc saw and recommended outpatient f/u.  The patient was also found to have E coli bacteremia and started on zosyn.  She was discharged with ciprofloxacin PO until 4/27.  The patient was also noted to have ascites and underwent a paracentesis -- ascitic fluid positive for malignant cells.  The patient was also found to have an acute below the knee DVT and was started on apixaban.    The patient also had an adm 03/2018 at [facility] for abd pain and jaundice.  She was noted to have biliary obstruction of likely metastatic origin.  She underwent ERCP and had a biliary stent placed.    The patient returns today for SOB and abd pain.  She states that she has had constant abd pain, mostly located on the L side of her abdomen, x months.  She takes acetaminophen to relieve the pain, but it does not help.  She states that this pain has not improved over the past few months but has not worsened.  She also reports new SOB that started today.  She feels like she cannot catch her breath.  She denies fever, chills.  She reports nausea, emesis that is typically brown.  She also has not been able to take PO for the past few days.  She has had minimal UOP as well for the past few days.    In the ED, Tmax 100.5F, -126, BP /40-70, SpO2 97% 3L NC.  The patient received 3L NS, zosyn.  BP remained low, and thus the MICU was consulted.      PAST MEDICAL & SURGICAL HISTORY:  Malignant neoplasm of ovary, unspecified laterality  S/P DENNY-BSO      FAMILY HISTORY:  No pertinent family history in first degree relatives      SOCIAL HISTORY:  Smoking: [ ] Never Smoked [ x ] Former Smoker (__ packs x ___ years) [ ] Current Smoker  (__ packs x ___ years)  Substance Use: [ x ] Denies [ ] Never Used [ ] Used ____  EtOH Use: denies  Marital Status: [ ] Single [ ]  [ ]  [ ]   Sexual History:   Occupation:  Recent Travel:  Country of Birth: [place]  Advance Directives: DNR/I    Allergies    No Known Allergies    Intolerances        HOME MEDICATIONS:    REVIEW OF SYSTEMS:  Constitutional: [ - ] fevers [ - ] chills [ ] weight loss [ ] weight gain  HEENT: [ ] dry eyes [ ] eye irritation [ ] postnasal drip [ ] nasal congestion  CV: [ - ] chest pain [ ] orthopnea [ - ] palpitations [ ] murmur  Resp: [ - ] cough [ + ] shortness of breath [ + ] dyspnea [ ] wheezing [ - ] sputum [ - ] hemoptysis  GI: [ + ] nausea [ + ] vomiting [ - ] diarrhea [ - ] constipation [ + ] abd pain [ ] dysphagia   : [ - ] dysuria [ - ] nocturia [ ] hematuria [ ] increased urinary frequency  Musculoskeletal: [ ] back pain [ ] myalgias [ ] arthralgias [ ] fracture  Skin: [ ] rash [ ] itch  Neurological: [ ] headache [ ] dizziness [ ] syncope [ ] weakness [ ] numbness  Psychiatric: [ ] anxiety [ ] depression  Endocrine: [ ] diabetes [ ] thyroid problem  Hematologic/Lymphatic: [ ] anemia [ ] bleeding problem  Allergic/Immunologic: [ ] itchy eyes [ ] nasal discharge [ ] hives [ ] angioedema  [ ] All other systems negative  [ ] Unable to assess ROS because ________    OBJECTIVE:  ICU Vital Signs Last 24 Hrs  T(C): 38.1 (25 Apr 2018 10:45), Max: 38.1 (25 Apr 2018 10:45)  T(F): 100.5 (25 Apr 2018 10:45), Max: 100.5 (25 Apr 2018 10:45)  HR: 112 (25 Apr 2018 14:48) (106 - 126)  BP: 83/60 (25 Apr 2018 14:48) (72/40 - 106/60)  BP(mean): 69 (25 Apr 2018 14:34) (63 - 73)  ABP: --  ABP(mean): --  RR: 20 (25 Apr 2018 14:48) (18 - 27)  SpO2: 95% (25 Apr 2018 14:48) (89% - 100%)        CAPILLARY BLOOD GLUCOSE          PHYSICAL EXAM:  T(C): 36.9 (04-25-18 @ 16:25), Max: 38.1 (04-25-18 @ 10:45)  HR: 117 (04-25-18 @ 16:25) (106 - 126)  BP: 90/63 (04-25-18 @ 16:25) (72/40 - 106/60)  RR: 26 (04-25-18 @ 16:25) (18 - 27)  SpO2: 96% (04-25-18 @ 16:25) (89% - 100%)    Gen: awake, alert, pleasant  HENT: neck soft / supple; MMM  Lymph: no LAD noted in neck  Eye: PERRL, sclerae anicteric  CV: normal rate, regular rhythm  Pulm:  no BS R lung base, otherwise no rales / crackles  Abd: +BS, distended, TTP RUQ, LUQ, LLQ  Skin: warm, dry  Ext: 1+ pitting edema to knee  Neuro: answering questions appropriately, following commands appropriately, recent and remote memory intact  Psych: normal mood / affect    LINES:     HOSPITAL MEDICATIONS:  MEDICATIONS  (STANDING):  buDESOnide  80 MICROgram(s)/formoterol 4.5 MICROgram(s) Inhaler 2 Puff(s) Inhalation two times a day  piperacillin/tazobactam IVPB. 3.375 Gram(s) IV Intermittent every 8 hours    MEDICATIONS  (PRN):  acetaminophen   Tablet 650 milliGRAM(s) Oral every 6 hours PRN For Temp greater than 38 C (100.4 F)  senna 2 Tablet(s) Oral at bedtime PRN Constipation      LABS:                        11.2   26.4  )-----------( 399      ( 25 Apr 2018 10:46 )             35.0     Hgb Trend: 11.2<--, 11.4<--, 11.5<--  04-25    136  |  94<L>  |  40<H>  ----------------------------<  179<H>  4.9   |  25  |  2.24<H>    Ca    9.4      25 Apr 2018 10:46    TPro  6.4  /  Alb  2.1<L>  /  TBili  1.2  /  DBili  x   /  AST  83<H>  /  ALT  77<H>  /  AlkPhos  1256<H>  04-25    Creatinine Trend: 2.24<--, 0.89<--, 0.91<--, 0.96<--, 1.14<--, 1.25<--        Venous Blood Gas:  04-25 @ 10:46  7.45/38/39/26/68  VBG Lactate: 4.5      MICROBIOLOGY:     RADIOLOGY:  [ x ] Reviewed and interpreted by me  X-ray abd non-obstructive bowel gas pattern  CXR small R pleff  CT A/P pending read    EKG: sinus tachycardia, , QTc 477, no MALIHA/D noted

## 2018-04-25 NOTE — ED PROVIDER NOTE - ATTENDING CONTRIBUTION TO CARE
Mally:  I have independently evaluated the patient and have documented in the appropriate sections above.  I agree with the exam and plan as noted above.

## 2018-04-25 NOTE — H&P ADULT - NSHPPHYSICALEXAM_GEN_ALL_CORE
.  VITAL SIGNS:  T(C): 38.1 (04-25-18 @ 10:45), Max: 38.1 (04-25-18 @ 10:45)  T(F): 100.5 (04-25-18 @ 10:45), Max: 100.5 (04-25-18 @ 10:45)  HR: 112 (04-25-18 @ 14:48) (106 - 126)  BP: 83/60 (04-25-18 @ 14:48) (72/40 - 106/60)  BP(mean): 69 (04-25-18 @ 14:34) (63 - 73)  RR: 20 (04-25-18 @ 14:48) (18 - 27)  SpO2: 95% (04-25-18 @ 14:48) (89% - 100%)  Wt(kg): --    PHYSICAL EXAM:    Constitutional: Lying in bed. Nontoxic appearing.  NAD  Head: NC/AT  Eyes: PERRL, EOMI, anicteric sclera  ENT: no nasal discharge; uvula midline, no oropharyngeal erythema or exudates  Neck: supple; no JVD or thyromegaly  Respiratory: Decreased breath sound at right base  Cardiac: Tachycardic  Gastrointestinal: soft, tender to palpation diffusely; no rebound or guarding; +BSx4  Extremities: WWP, no clubbing or cyanosis; 2+peripheral edema  Musculoskeletal: NROM x4; no joint swelling, tenderness or erythema  Neurologic: AAOx3; CNII-XII grossly intact; no focal deficits  Psychiatric: affect and characteristics of appearance, verbalizations, behaviors are appropriate

## 2018-04-25 NOTE — CONSULT NOTE ADULT - ASSESSMENT
72 year old female with metastatic ovarian cancer with carcinomatosis and malignant ascites presents with abdominal discomfort and distension  -Patient likely has recurrence of ascites   -Unclear if LFT elevation is secondary to gallbladder vs peritoneal carcinomatosis  -Patient is pending a CT scan of the abdomen; if gallbladder appears more distended or inflamed, patient could benefit from IR percutaneous cholecystostomy  -Patient not a surgical candidate given comorbidities   -Medicine evaluation  -GI evaluation  -Heme/Onc evaluation  -General surgery to follow

## 2018-04-25 NOTE — ED PROVIDER NOTE - PROGRESS NOTE DETAILS
case and plan discussed with Dr. Fonseca who states will see patient in hospital and continue to follow. Will call Dr. Pablo for admission per Dr. Fonseca patient persistently hypotensive, now with carter in place with minimal urine output. micu consult placed. -Yee Cristobal PA-C Evaluated patient at bedside, BP 90s/60s, mentating well.  Awaiting ICU consult.  Declined palliative consult at this time as feels overwhelmed.  Reviewed plan with patient.  Cathie Arzola, PGY3

## 2018-04-25 NOTE — ED ADULT NURSE NOTE - CAS EDN DISCHARGE ASSESSMENT
Awake/No adverse reaction to first time med in ED/Symptoms improved/Patient baseline mental status/Alert and oriented to person, place and time

## 2018-04-25 NOTE — ED ADULT NURSE NOTE - OBJECTIVE STATEMENT
73 yo female presents to the ed c.o shortness of breath. One month ago patient experienced abdominal pain, admitted to [facility] for inflamed gallbladder, unable to have gallbladder removed due to ovarian cancer. Patient had a drain placed and was sent to rehab for two weeks, patient had a visiting nurse for care. Patient was admitted on 4/15 for hypotension, received IV antibiotics during stay and was discharged on Friday, pt discharged home on elaquis (for b/l knee DVTs) and cipro. Today patient experienced hypotension at home with abdominal discomfort, no bowel movement in a few days and patient is unable to tolerate po, vomiting with liquids. increased abdominal distension, with abdominal and back discomfort. abdomen is distended and firm. patient c.o shortness of breath, on 3L NC at home, 88 % on room air, placed on 3L upon arrival. patient is febrile 100.5 rectally. states that her previous admissions she was afebrile. daughter at the bedside. patient fully undressed and assessed head to toe, bruising to right AC s.p iv insertion during last admission.   Patient undressed and placed into gown, call bell in hand and side rails up for safety. warm blanket provided, vital signs stable.

## 2018-04-25 NOTE — ED ADULT NURSE REASSESSMENT NOTE - NS ED NURSE REASSESS COMMENT FT1
Patient assisted to bedside bathroom. Patient ambulating well with one assist. Vital signs repeated, patient's blood pressure remains low. Spoke with ANN Fraire about plan of care, patient admitted to the hospital, pending ct scan and room assignment. daughter understands the plan of care.

## 2018-04-25 NOTE — ED PROVIDER NOTE - OBJECTIVE STATEMENT
73 y/o F metastatic ovarian CA, COPD, recent admission for abdominal pain E. coli bacteremia (on zosyn on admission and discharged on cipro), seen by surgery as patient had diagnosis of cholecystitis and had biliary stent placed, not candidate for cholecystectomy or perc drain per surgical team, presenting today for episode of vomiting yesterday and today, increased abdominal pain and distension, fever and inability to tolerate PO. Patient has continued to have low BP since she was discharged home last week, but visiting nurse this AM became concerned as patient had episode of vomiting after trying to drink too much pedialyte. Pt reports that her abdomen was less distended after she had paracentesis on her admission, but now reporting that distension has returned. 73 y/o F metastatic ovarian CA, COPD, recent admission for abdominal pain E. coli bacteremia (on zosyn on admission and discharged on cipro), seen by surgery as patient had diagnosis of cholecystitis and had biliary stent placed, not candidate for cholecystectomy or perc drain per surgical team, presenting today for episode of vomiting yesterday and today, increased abdominal pain and distension, fever and inability to tolerate PO. Patient has continued to have low BP since she was discharged home last week, but visiting nurse this AM became concerned as patient had episode of vomiting after trying to drink too much pedialyte. Pt reports that her abdomen was less distended after she had paracentesis on her admission, but now reporting that distension has returned.    Bal:  Here with abdominl apain and vomiting.  hx as above.

## 2018-04-25 NOTE — H&P ADULT - ASSESSMENT
72 year old female with metastatic ovarian cancer (Dx 2008, s/p DENNY/BSO, chemo with recurrence 2017 and repeat chemo, last 12/2017), COPD who presented from home with abdominal pain SOB and weakness found to have sepsis likely 2/2 intrabdominal source, AMANDA and dehydration

## 2018-04-25 NOTE — ED PROCEDURE NOTE - GENERAL PROCEDURE NAME
POCUS: Emergency Department Focused Ultrasound performed at patient's bedside.  The complete report may be available in PACS, see below for findings.

## 2018-04-25 NOTE — H&P ADULT - PROBLEM SELECTOR PLAN 3
Likely ATN 2/2 sepsis and dehydration resulting in decreased renal perfusion.   - Check UA, urine lytes  - f/u CT abdomen   - Trend BMP  - Renally dose medications

## 2018-04-25 NOTE — PROGRESS NOTE ADULT - SUBJECTIVE AND OBJECTIVE BOX
pt seen and examined in MICU with PGY 2. awake alert, mentating well, /60s/. Chart reviewed and bedside sonogram performed. There is some ascites, as per patient feels increased. I had goals of care discussion with patient she wishes to be DNR/DNI and does not want substantial invasive procedures. ok with vasopressors through the port if needed. CT reviewed, Will add Vanco to meropenem add stress dose steroids, NS at 125/hr. GI consulted. Can discuss in morning with IR re possibly draining peripancreatic fluid.  Bedside Sono with moderate ascites, which could be amenable to palliative paracentesis in AM. Lungs and A line predominant, few scattered B lines, on exam she has no rales and S1, S2 regular, her abd is distended with plapable mets. Her extremities are cool.   CCM time 45 min

## 2018-04-25 NOTE — ED PROVIDER NOTE - CRITICAL CARE PROVIDED
interpretation of diagnostic studies/additional history taking/direct patient care (not related to procedure)/consult w/ pt's family directly relating to pts condition

## 2018-04-25 NOTE — H&P ADULT - PROBLEM SELECTOR PLAN 1
Pt presented with weakness, abdominal pain, N/V and inability to tolerate PO. Was recently admitted for e coli bacteremia, continuing on Cipro therapy to be completed on 4/27. Suspect N/V and weakness related to intraabdominal infection vs pancreatitis given elevated Lipase vs and metastatic ovarian ca. alk phos continue to downtrend since last admission, Bili and LFTS slightly increased which could be 2/2 sepsis. CT abdomen performed awaiting results  - Given Zosyn in the Ed. Will c/w Zosyn to cover intraabdominal pathogens. Would broaden to carbapenem if necrosis seen on CT abdomen   - f/u Bcx, Ucx  - CT abdomen performed, awaiting read. Surgery following. May require perch lexi if significant inflammation seen around gallbladder

## 2018-04-25 NOTE — ED ADULT NURSE REASSESSMENT NOTE - NS ED NURSE REASSESS COMMENT FT1
pt remains hypotensive, speaking with team for intervention plan.   Chicas catheter inserted using sterile technique. Second RN present to confirm sterility. Bedside drainage to gravity. Stat lock in place. placed with Ashly WHATLEY RN, per order.   pt output 30ML of urine .

## 2018-04-25 NOTE — ED PROVIDER NOTE - NS ED ROS FT
Constitutional: +fever  Eyes: No visual changes, eye pain or redness  HEENT: No throat pain, ear pain, nasal pain. No nose bleeding.  CV: No chest pain or lower extremity edema  Resp: No SOB no cough  GI: +abdominal pain, nausea, vomiting and constipation for 3 days.   : No dysuria, hematuria.   MSK: No musculoskeletal pain  Skin: No rash  Neuro: No headache. No numbness or tingling. No weakness.

## 2018-04-26 ENCOUNTER — APPOINTMENT (OUTPATIENT)
Dept: RADIATION ONCOLOGY | Facility: CLINIC | Age: 73
End: 2018-04-26

## 2018-04-26 DIAGNOSIS — R10.9 UNSPECIFIED ABDOMINAL PAIN: ICD-10-CM

## 2018-04-26 DIAGNOSIS — Z51.5 ENCOUNTER FOR PALLIATIVE CARE: ICD-10-CM

## 2018-04-26 LAB
ALBUMIN SERPL ELPH-MCNC: 1.8 G/DL — LOW (ref 3.3–5)
ALP SERPL-CCNC: 890 U/L — HIGH (ref 40–120)
ALT FLD-CCNC: 58 U/L — HIGH (ref 10–45)
ANION GAP SERPL CALC-SCNC: 13 MMOL/L — SIGNIFICANT CHANGE UP (ref 5–17)
APTT BLD: 106.2 SEC — HIGH (ref 27.5–37.4)
APTT BLD: 81 SEC — HIGH (ref 27.5–37.4)
AST SERPL-CCNC: 56 U/L — HIGH (ref 10–40)
BILIRUB SERPL-MCNC: 1.1 MG/DL — SIGNIFICANT CHANGE UP (ref 0.2–1.2)
BUN SERPL-MCNC: 42 MG/DL — HIGH (ref 7–23)
CALCIUM SERPL-MCNC: 8.1 MG/DL — LOW (ref 8.4–10.5)
CHLORIDE SERPL-SCNC: 98 MMOL/L — SIGNIFICANT CHANGE UP (ref 96–108)
CO2 SERPL-SCNC: 25 MMOL/L — SIGNIFICANT CHANGE UP (ref 22–31)
CREAT SERPL-MCNC: 2.1 MG/DL — HIGH (ref 0.5–1.3)
CULTURE RESULTS: NO GROWTH — SIGNIFICANT CHANGE UP
GLUCOSE BLDC GLUCOMTR-MCNC: 187 MG/DL — HIGH (ref 70–99)
GLUCOSE BLDC GLUCOMTR-MCNC: 199 MG/DL — HIGH (ref 70–99)
GLUCOSE BLDC GLUCOMTR-MCNC: 212 MG/DL — HIGH (ref 70–99)
GLUCOSE SERPL-MCNC: 191 MG/DL — HIGH (ref 70–99)
HCT VFR BLD CALC: 29.4 % — LOW (ref 34.5–45)
HGB BLD-MCNC: 9.2 G/DL — LOW (ref 11.5–15.5)
MAGNESIUM SERPL-MCNC: 2.1 MG/DL — SIGNIFICANT CHANGE UP (ref 1.6–2.6)
MCHC RBC-ENTMCNC: 29.9 PG — SIGNIFICANT CHANGE UP (ref 27–34)
MCHC RBC-ENTMCNC: 31.2 GM/DL — LOW (ref 32–36)
MCV RBC AUTO: 95.9 FL — SIGNIFICANT CHANGE UP (ref 80–100)
PHOSPHATE SERPL-MCNC: 5.2 MG/DL — HIGH (ref 2.5–4.5)
PLATELET # BLD AUTO: 395 K/UL — SIGNIFICANT CHANGE UP (ref 150–400)
POTASSIUM SERPL-MCNC: 4.4 MMOL/L — SIGNIFICANT CHANGE UP (ref 3.5–5.3)
POTASSIUM SERPL-SCNC: 4.4 MMOL/L — SIGNIFICANT CHANGE UP (ref 3.5–5.3)
PROT SERPL-MCNC: 5.4 G/DL — LOW (ref 6–8.3)
RBC # BLD: 3.07 M/UL — LOW (ref 3.8–5.2)
RBC # FLD: 15.7 % — HIGH (ref 10.3–14.5)
SODIUM SERPL-SCNC: 136 MMOL/L — SIGNIFICANT CHANGE UP (ref 135–145)
SPECIMEN SOURCE: SIGNIFICANT CHANGE UP
WBC # BLD: 29.1 K/UL — HIGH (ref 3.8–10.5)
WBC # FLD AUTO: 29.1 K/UL — HIGH (ref 3.8–10.5)

## 2018-04-26 PROCEDURE — 82565 ASSAY OF CREATININE: CPT

## 2018-04-26 PROCEDURE — 82962 GLUCOSE BLOOD TEST: CPT

## 2018-04-26 PROCEDURE — 85730 THROMBOPLASTIN TIME PARTIAL: CPT

## 2018-04-26 PROCEDURE — 82570 ASSAY OF URINE CREATININE: CPT

## 2018-04-26 PROCEDURE — 76937 US GUIDE VASCULAR ACCESS: CPT

## 2018-04-26 PROCEDURE — 96375 TX/PRO/DX INJ NEW DRUG ADDON: CPT

## 2018-04-26 PROCEDURE — 84132 ASSAY OF SERUM POTASSIUM: CPT

## 2018-04-26 PROCEDURE — 99285 EMERGENCY DEPT VISIT HI MDM: CPT | Mod: 25

## 2018-04-26 PROCEDURE — 76705 ECHO EXAM OF ABDOMEN: CPT

## 2018-04-26 PROCEDURE — 96374 THER/PROPH/DIAG INJ IV PUSH: CPT | Mod: XU

## 2018-04-26 PROCEDURE — 85610 PROTHROMBIN TIME: CPT

## 2018-04-26 PROCEDURE — 82436 ASSAY OF URINE CHLORIDE: CPT

## 2018-04-26 PROCEDURE — 99291 CRITICAL CARE FIRST HOUR: CPT

## 2018-04-26 PROCEDURE — 83605 ASSAY OF LACTIC ACID: CPT

## 2018-04-26 PROCEDURE — 74176 CT ABD & PELVIS W/O CONTRAST: CPT

## 2018-04-26 PROCEDURE — 93005 ELECTROCARDIOGRAM TRACING: CPT

## 2018-04-26 PROCEDURE — 71045 X-RAY EXAM CHEST 1 VIEW: CPT

## 2018-04-26 PROCEDURE — 82803 BLOOD GASES ANY COMBINATION: CPT

## 2018-04-26 PROCEDURE — 80053 COMPREHEN METABOLIC PANEL: CPT

## 2018-04-26 PROCEDURE — 87040 BLOOD CULTURE FOR BACTERIA: CPT

## 2018-04-26 PROCEDURE — 84300 ASSAY OF URINE SODIUM: CPT

## 2018-04-26 PROCEDURE — 94640 AIRWAY INHALATION TREATMENT: CPT

## 2018-04-26 PROCEDURE — 82435 ASSAY OF BLOOD CHLORIDE: CPT

## 2018-04-26 PROCEDURE — 82330 ASSAY OF CALCIUM: CPT

## 2018-04-26 PROCEDURE — 83690 ASSAY OF LIPASE: CPT

## 2018-04-26 PROCEDURE — 83735 ASSAY OF MAGNESIUM: CPT

## 2018-04-26 PROCEDURE — 74019 RADEX ABDOMEN 2 VIEWS: CPT

## 2018-04-26 PROCEDURE — 85014 HEMATOCRIT: CPT

## 2018-04-26 PROCEDURE — 81001 URINALYSIS AUTO W/SCOPE: CPT

## 2018-04-26 PROCEDURE — 84100 ASSAY OF PHOSPHORUS: CPT

## 2018-04-26 PROCEDURE — 87086 URINE CULTURE/COLONY COUNT: CPT

## 2018-04-26 PROCEDURE — 82947 ASSAY GLUCOSE BLOOD QUANT: CPT

## 2018-04-26 PROCEDURE — 85027 COMPLETE CBC AUTOMATED: CPT

## 2018-04-26 PROCEDURE — 84295 ASSAY OF SERUM SODIUM: CPT

## 2018-04-26 PROCEDURE — 36000 PLACE NEEDLE IN VEIN: CPT | Mod: RT,XU

## 2018-04-26 RX ORDER — MIDODRINE HYDROCHLORIDE 2.5 MG/1
10 TABLET ORAL EVERY 8 HOURS
Qty: 0 | Refills: 0 | Status: DISCONTINUED | OUTPATIENT
Start: 2018-04-26 | End: 2018-04-26

## 2018-04-26 RX ORDER — VANCOMYCIN HCL 1 G
1000 VIAL (EA) INTRAVENOUS EVERY 24 HOURS
Qty: 0 | Refills: 0 | Status: DISCONTINUED | OUTPATIENT
Start: 2018-04-26 | End: 2018-04-26

## 2018-04-26 RX ORDER — HEPARIN SODIUM 5000 [USP'U]/ML
1600 INJECTION INTRAVENOUS; SUBCUTANEOUS
Qty: 25000 | Refills: 0 | Status: DISCONTINUED | OUTPATIENT
Start: 2018-04-26 | End: 2018-04-26

## 2018-04-26 RX ORDER — ACETAMINOPHEN 500 MG
650 TABLET ORAL ONCE
Qty: 0 | Refills: 0 | Status: COMPLETED | OUTPATIENT
Start: 2018-04-26 | End: 2018-04-26

## 2018-04-26 RX ORDER — SODIUM CHLORIDE 9 MG/ML
500 INJECTION INTRAMUSCULAR; INTRAVENOUS; SUBCUTANEOUS ONCE
Qty: 0 | Refills: 0 | Status: COMPLETED | OUTPATIENT
Start: 2018-04-26 | End: 2018-04-26

## 2018-04-26 RX ORDER — ONDANSETRON 8 MG/1
4 TABLET, FILM COATED ORAL ONCE
Qty: 0 | Refills: 0 | Status: COMPLETED | OUTPATIENT
Start: 2018-04-26 | End: 2018-04-26

## 2018-04-26 RX ORDER — HEPARIN SODIUM 5000 [USP'U]/ML
16 INJECTION INTRAVENOUS; SUBCUTANEOUS
Qty: 25000 | Refills: 0 | Status: DISCONTINUED | OUTPATIENT
Start: 2018-04-26 | End: 2018-04-26

## 2018-04-26 RX ADMIN — HEPARIN SODIUM 16 UNIT(S)/HR: 5000 INJECTION INTRAVENOUS; SUBCUTANEOUS at 03:29

## 2018-04-26 RX ADMIN — Medication 650 MILLIGRAM(S): at 10:55

## 2018-04-26 RX ADMIN — ONDANSETRON 4 MILLIGRAM(S): 8 TABLET, FILM COATED ORAL at 02:41

## 2018-04-26 RX ADMIN — HEPARIN SODIUM 14 UNIT(S)/HR: 5000 INJECTION INTRAVENOUS; SUBCUTANEOUS at 10:43

## 2018-04-26 RX ADMIN — SODIUM CHLORIDE 500 MILLILITER(S): 9 INJECTION INTRAMUSCULAR; INTRAVENOUS; SUBCUTANEOUS at 10:25

## 2018-04-26 RX ADMIN — MIDODRINE HYDROCHLORIDE 10 MILLIGRAM(S): 2.5 TABLET ORAL at 10:42

## 2018-04-26 RX ADMIN — Medication 1: at 05:06

## 2018-04-26 RX ADMIN — Medication 1: at 01:45

## 2018-04-26 RX ADMIN — Medication 50 MILLIGRAM(S): at 05:06

## 2018-04-26 RX ADMIN — BUDESONIDE AND FORMOTEROL FUMARATE DIHYDRATE 2 PUFF(S): 160; 4.5 AEROSOL RESPIRATORY (INHALATION) at 08:59

## 2018-04-26 RX ADMIN — Medication 2: at 11:48

## 2018-04-26 RX ADMIN — Medication 650 MILLIGRAM(S): at 11:25

## 2018-04-26 RX ADMIN — MEROPENEM 100 MILLIGRAM(S): 1 INJECTION INTRAVENOUS at 05:06

## 2018-04-26 RX ADMIN — Medication 250 MILLIGRAM(S): at 10:29

## 2018-04-26 NOTE — PROGRESS NOTE ADULT - RS GEN PE MLT RESP DETAILS PC
good air movement/clear to auscultation bilaterally/breath sounds equal/respirations non-labored/rales/airway patent/no chest wall tenderness/no intercostal retractions

## 2018-04-26 NOTE — CONSULT NOTE ADULT - PROBLEM SELECTOR RECOMMENDATION 3
Recommendation was for Dilaudid 0.5 mg IVP q4H prn  As per primary team patient wanted tylenol and zofran for nausea

## 2018-04-26 NOTE — PROGRESS NOTE ADULT - ASSESSMENT
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 Admitted to MICU for management of Septic Shock on 4/25.    Neuro: This is a 72yoF with Hx of refractory metastatic ovarian ca with mets to L temporal lobe, + peritoneal carcinomatosis, recurrent cholecystitis s/p biliary drain-, Hx e coli bacteremia  and urosepsis. The pt now admittd 4/25 w c/o abd pain / SOB and was found to be hypotensive and febrile. CT of the and a dilated GB filled w sludge / stones, possible pancreatitis vs concern for mets, enlarged victor m adrenal concerned for adrenal metastasis. The pt now in the MICU 4/25 is being treated for septic shock.     Neuro: Neuro status intact  -c/w w neuro checks as per protocol This is a 72yoF with Hx of refractory metastatic ovarian ca with mets to L temporal lobe, + peritoneal carcinomatosis, recurrent cholecystitis s/p biliary drain-, Hx e coli bacteremia  and urosepsis. The pt now admittd 4/25 w c/o abd pain / SOB and was found to be hypotensive and febrile. CT of the and a dilated GB filled w sludge / stones, possible pancreatitis vs concern for mets, enlarged victor m adrenal concerned for adrenal metastasis. The pt now in the MICU 4/25 is being treated for septic shock.     Neuro: Neuro status intact  -c/w w neuro checks as per protocol    CV: Hypotension most likely 2/2 sepsis / dehydration.   -IVF bolus as needed- in the setting of diminished u/o / hypotension  -c/w norepinephrine - pt had tachycardic  -start midodrine    Pulm: R pleural effusion w compressive atelectasis noted n POCUS w a trace L sided pleural effusion.   -c/w supplemental O2 / N/C as needed  -c/w Symbicort - for pt COPD    ID: Septic Shock possibly 2/2 recent E. Coli Bacteremia vs / and biliary source vs / and pancreatitis   -c/w meropenem   -consider d/c Mediport- possible source- port was not removed from early this months bout of E Coli Bacteremia  -c/w BP support as needed  -UA negative  -F/U BC x 2- results pending    Heme: +BLE DVT had been discharged (x2 days) on Eliquis  -c/w heparin gtt    Renal: AMANDA 2/2 dehydration and hypoperfusion in the setting of hypotension  -c/w carter in place for now  -strict I&O  -would c/w IVF- / maintain MAP >/= 65    Endocrine: Hx NIDDM  -c/w HISS    GI: Hx cholecystitis s/p stent for obstruction - GB dilated, filled w sludge / stones, no surgical intervention / surgery doubts IR drainage will be of benefit at this time- pt opp's non invasive interventions  -c/w PPI  -zofran for nausea  -start PO diet as tolerated  -bowel regimen    GOC: Pt aware of her poor prognosis has opt for DNR /DNI, does not want any invasive procedures and desires to go home  -Palliative Care Consult  -Hospice Consult-   -symptom and pain management as needed This is a 72yoF with Hx of refractory metastatic ovarian ca with mets to L temporal lobe, + peritoneal carcinomatosis, recurrent cholecystitis s/p biliary drain-, Hx e coli bacteremia  and urosepsis. The pt now admittd 4/25 w c/o abd pain / SOB and was found to be hypotensive and febrile. CT of the and a dilated GB filled w sludge / stones, possible pancreatitis vs concern for mets, enlarged victor m adrenal concerned for adrenal metastasis. The pt now in the MICU 4/25 is being treated for septic shock.     Neuro: Neuro status intact  -c/w w neuro checks as per protocol    CV: Hypotension most likely 2/2 sepsis / dehydration.   -IVF bolus as needed- in the setting of diminished u/o / hypotension  -c/w norepinephrine - pt had tachycardic  -start midodrine    Pulm: R pleural effusion w compressive atelectasis noted n POCUS w a trace L sided pleural effusion.   -c/w supplemental O2 / N/C as needed  -c/w Symbicort - for pt COPD  -consider thoracentesis- if pt respiratory status declines with increased WOB / SOB- pt with Significant R pleural effusion     ID: Septic Shock possibly 2/2 recent E. Coli Bacteremia vs / and biliary source vs / and pancreatitis   -c/w meropenem   -consider d/c Mediport- possible source- port was not removed from early this months bout of E Coli Bacteremia  -c/w BP support as needed  -UA negative  -F/U BC x 2- results pending    Heme: +BLE DVT had been discharged (x2 days) on Eliquis  -c/w heparin gtt    Renal: AMANDA 2/2 dehydration and hypoperfusion in the setting of hypotension  -c/w carter in place for now  -strict I&O  -would c/w IVF- / maintain MAP >/= 65    Endocrine: Hx NIDDM  -c/w HISS    GI: Hx cholecystitis s/p stent for obstruction - GB dilated, filled w sludge / stones, no surgical intervention / surgery doubts IR drainage will be of benefit at this time- pt opp's non invasive interventions  -c/w PPI  -zofran for nausea  -start PO diet as tolerated  -bowel regimen  -consider palliative paracentesis - pt with significant ascites    GOC: Pt aware of her poor prognosis has opt for DNR /DNI, does not want any invasive procedures and desires to go home  -Palliative Care Consult  -Hospice Consult-   -symptom and pain management as needed

## 2018-04-26 NOTE — DISCHARGE NOTE FOR THE EXPIRED PATIENT - HOSPITAL COURSE
This is a 72yoF with recurrent refractory metastatic ovarian Ca s/p DENNY / BSO - s/p chemo w recurrence 2017 - last chemo 3/17, hx of COPD and DM2. The pt presented to the ED  w c/o SOB  and abdominal pain, was found to be hypotensive (SBP 70's), febrile (100.5), was given 3L IVF bolus, started on zosyn, and sent to the MICU for further management. Pt most recent admission - was also initially admitted for abd pain, SOB, whose hospital course was c/b a new finding of a Left temporal lobe metastasis, E. Coli bacteremia / E. Coli urosepsis tx w zosyn, (d/c home on cipro), BLE DVT's- (d/c home on apixaban), had significant ascites, underwent a paracentesis (1.5L removed) - tested + for malignant cells, +cholecystitis, underwent a HIDA scan -secondary biliary stent. In 2018 the pt was admitted to [facility] for jaundice 2/2 a biliary obstruction thought to be from a metastatic obstruction,   and a biliary stent was placed initially at that time.   The pt made herself a DNR / DNI, refused any further invasive interventions. Palliative care consult was called.   At 1257 today 18 the pt became bradycardic, desaturated, and ceased breathing. Pupils were fixed and dilated, corneal reflex's absent, negative cardiac sounds, pulses non palpable, respirations absent. The pt was pronounced  at 12:57 today 2018. The patients daughter Laurie has been notified and we are presently awaiting her arrival to her mothers bedside.

## 2018-04-26 NOTE — CONSULT NOTE ADULT - ASSESSMENT
Patient is a 71 y/o F w/ significant hx of COPD, ovarian ca s/p DENNY/BSO now with peritoneal carcinomatosis and mets to the brain admitted for hypotension and abdominal pain. Patient was found to be in septic shock and was admitted to the MICU on 2 pressors. She was made DNR/ DNI by primary team. Palliative was called for further GOC.

## 2018-04-26 NOTE — PROGRESS NOTE ADULT - ATTENDING COMMENTS
Pt with metastatic ovarian Ca with peritoneal carcinomatosis and pancreatitis. This is likely d/t her peritoneal carcinomatosis. Doubt that a perc lexi tube would alter her course positively, but if Medicine feels warranted, they can d/w IR.  Would ask the Lab to quantify the lipase.  No role for any surgery and unfortunately nothing that I can see we can assist with.  D/w MICU, will sign off, pls call us back with questions/concerns.
Agree with above. Seen and examined with resident. Metastatic ovarian cancer. On antibiotics empirically with blood cultures pending. Poor prognosis with multiple mets. Palliative care eval. Supportive care.

## 2018-04-26 NOTE — CONSULT NOTE ADULT - SUBJECTIVE AND OBJECTIVE BOX
HPI:  This is a 72 year old female with metastatic ovarian cancer (Dx , s/p DENNY/BSO, chemo with recurrence 2017 and repeat chemo, last 2017), COPD who presented from home with abdominal pain SOB and weakness. Of note pt was recently admitted to [facility] for abdominal pain, found to have new brain met and E. coli bacteremia. She was discharged on cipro to be completed on . During that admission, she was seen by surgery as patient had diagnosis of cholecystitis and had biliary stent placed. Since being discharged pt states that she has felt week. She had episodes of N/V yesterday and again the day of presentation with inability to tolerate anything orally. She also reported increased abdominal pain and distension, as well as subjective fever. No chest pain/palpitations (2018 15:24)      PERTINENT PMH REVIEWED:  [x ] YES [ ] NO           SOCIAL HISTORY:   Significant other/partner:               Children:      1             Tenriism/Spirituality: Yazidi   Substance hx:  [ ] YES   [x] NO                   Tobacco hx:  [ ] YES  [x ] NO                       Alcohol hx: [ ] YES  [x ] NO         Home Opioid hx:  [ ] YES  [x ] NO   Living Situation: [x ] Home  [ ] Long term care  [ ] Rehab [ ] Other    FAMILY HISTORY:  No pertinent family history in first degree relatives    [ x] Family history non-contributory     BASELINE (I)ADLs (prior to admission):  [place]: [ ] total  [ ] moderate [x ] dependent    ADVANCE DIRECTIVES:    DNR [x ] YES [ ] NO                            [x ] Completed   MOLST  [ ] YES [x ] NO                      [ ] Completed  Health Care Proxy [ ] YES  [x ] NO   [ ] Completed  Living Will  [ ] YES [ x] NO             [x ] Surrogate  [ ] HCP  [ ] Guardian:        Daughter Gabby                Phone#: [phone]    Allergies  No Known Allergies  Intolerances    MEDICATIONS  (STANDING):  buDESOnide  80 MICROgram(s)/formoterol 4.5 MICROgram(s) Inhaler 2 Puff(s) Inhalation two times a day  dextrose 5%. 1000 milliLiter(s) (50 mL/Hr) IV Continuous <Continuous>  dextrose 50% Injectable 12.5 Gram(s) IV Push once  dextrose 50% Injectable 25 Gram(s) IV Push once  dextrose 50% Injectable 25 Gram(s) IV Push once  heparin  Infusion 16 Unit(s)/Hr (14 mL/Hr) IV Continuous <Continuous>  hydrocortisone sodium succinate Injectable 50 milliGRAM(s) IV Push every 8 hours  insulin lispro (HumaLOG) corrective regimen sliding scale   SubCutaneous every 6 hours  meropenem  IVPB      meropenem  IVPB 1000 milliGRAM(s) IV Intermittent every 12 hours  midodrine 10 milliGRAM(s) Oral every 8 hours  phenylephrine    Infusion 0.4 MICROgram(s)/kG/Min (13.665 mL/Hr) IV Continuous <Continuous>  sodium chloride 0.9%. 1000 milliLiter(s) (75 mL/Hr) IV Continuous <Continuous>  vancomycin  IVPB 1000 milliGRAM(s) IV Intermittent every 24 hours    MEDICATIONS  (PRN):  acetaminophen   Tablet 650 milliGRAM(s) Oral every 6 hours PRN For Temp greater than 38 C (100.4 F)  dextrose Gel 1 Dose(s) Oral once PRN Blood Glucose LESS THAN 70 milliGRAM(s)/deciliter  glucagon  Injectable 1 milliGRAM(s) IntraMuscular once PRN Glucose LESS THAN 70 milligrams/deciliter  senna 2 Tablet(s) Oral at bedtime PRN Constipation      PRESENT SYMPTOMS:  Source: [ x] Patient   [ ] Family   [ ] Team     Pain: Abdominal pain                  [ ] No [ x] Yes             [ ] Mild [x ] Moderate [ ] Severe    Onset - chronic  Location - abdominal  Duration - constant  Character - dull  Alleviating/Aggravating - alleviated w/ paracentesis   Radiation - none  Timing -      Dyspnea:                [x ] No [ ] Yes             [ ] Mild [ ] Moderate [ ] Severe    Anxiety:                  [x ] No [ ] Yes             [ ] Mild [ ] Moderate [ ] Severe    Fatigue:                  [ ] No [x ] Yes             [ ] Mild [ ] Moderate [ ] Severe    Nausea:                  [x ] No [ ] Yes             [ ] Mild [ ] Moderate [ ] Severe    Loss of appetite:   [ ] No [x ] Yes             [ ] Mild [ ] Moderate [ ] Severe    Constipation:        [ ] No [ x] Yes             [ ] Mild [ ] Moderate [ ] Severe    Other Symptoms:  [ x] All other review of systems negative   [ ] Unable to obtain due to poor mentation     Karnofsky Performance Score/Palliative Performance Status Version 2:   20      %    PHYSICAL EXAM:  Vital Signs Last 24 Hrs  T(C): 36.7 (2018 07:15), Max: 37.4 (2018 17:45)  T(F): 98 (2018 07:15), Max: 99.4 (2018 17:45)  HR: 0 (2018 13:00) (0 - 128)  BP: 0/0 (2018 13:00) (0/0 - 145/68)  BP(mean): 67 (2018 12:45) (59 - 101)  RR: 26 (2018 12:55) (12 - 38)  SpO2: 83% (2018 12:55) (83% - 100%) I&O's Summary    2018 07:  -  2018 07:00  --------------------------------------------------------  IN: 1835.6 mL / OUT: 445 mL / NET: 1390.6 mL    2018 07:01  -  2018 13:52  --------------------------------------------------------  IN: 1223.6 mL / OUT: 85 mL / NET: 1138.6 mL        General:  [x  ] Normal. Alert, Oriented x 3.     HEENT:  [ ] Normal   [x ] Dry mouth   [ ] ET Tube    [ ] Trach  [ ] Oral lesions    Lungs:   [ ] Clear [ x] Tachypnea  [ ] Audible excessive secretions   [ ] Rhonchi        [ ] Right [ ] Left [ ] Bilateral  [ ] Crackles        [ ] Right [ ] Left [ ] Bilateral  [ ] Wheezing     [ ] Right [ ] Left [ ] Bilateral    Cardiovascular:  S1, S2. No S3. No murmurs     Abdomen: [ ] Soft  [ x] Distended   [ ] +BS  [ ] Non tender [ ] Tender  [ ]PEG   [ ]OGT/ NGT   Last BM:       Genitourinary: [ ] Normal [ ] Incontinent   [ ] Oliguria/Anuria   [x ] Chicas    Musculoskeletal:  [ ] Normal   [ ] Weakness  [x ] Bedbound/Wheelchair bound [ ] Edema    Neurological: [x ] No focal deficits  [ ] Cognitive impairment  [ ] Dysphagia [ ] Dysarthria [ ] Paresis [ ] Other     Skin: [ ] Normal   [ ] Pressure ulcer(s)                  [ ] Rash    LABS:                        9.2    29.1  )-----------( 395      ( 2018 01:47 )             29.4         136  |  98  |  42<H>  ----------------------------<  191<H>  4.4   |  25  |  2.10<H>    Ca    8.1<L>      2018 01:47  Phos  5.2       Mg     2.1         TPro  5.4<L>  /  Alb  1.8<L>  /  TBili  1.1  /  DBili  x   /  AST  56<H>  /  ALT  58<H>  /  AlkPhos  890<H>      PT/INR - ( 2018 18:45 )   PT: 17.7 sec;   INR: 1.62 ratio         PTT - ( 2018 08:15 )  PTT:106.2 sec  Urinalysis Basic - ( 2018 15:18 )    Color: Yellow / Appearance: Turbid / S.029 / pH: x  Gluc: x / Ketone: Negative  / Bili: Small / Urobili: Negative   Blood: x / Protein: 100 mg/dL / Nitrite: Negative   Leuk Esterase: Negative / RBC: 0-2 /HPF / WBC 2-5 /HPF   Sq Epi: x / Non Sq Epi: x / Bacteria: x        Shock: [ x] Septic [ ] Cardiogenic [ ] Neurologic [ ] Hypovolemic  Vasopressors x 2  Inotrophs x     Protein Calorie Malnutrition: [ ] Mild [x ] Moderate [ ] Severe    Oral Intake: [ ] Unable/mouth care only [ x] Minimal [ ] Moderate [ ] Full Capability  Diet: [ ] NPO [ ] Tube feeds [ ] TPN [x ] Other     RADIOLOGY & ADDITIONAL STUDIES:  imaging reviewed     REFERRALS:   [ ] Chaplaincy  [ ] Hospice  [ ] Child Life  [ ] Social Work  [ ] Case management [ ] Holistic Therapy

## 2018-04-26 NOTE — PROGRESS NOTE ADULT - NEUROLOGICAL DETAILS
responds to pain/responds to verbal commands/no spontaneous movement/alert and oriented x 3/deep reflexes intact/sensation intact/normal strength

## 2018-04-26 NOTE — CONSULT NOTE ADULT - ATTENDING COMMENTS
Patient seen/examined.  Agree w above note and plan and have discussed plan w house staff.  Abdomen softly distended, minimal tenderness.  CT c/w ascites,  w/o much change since last week; collection in body of pancreas slightly larger.  Sending lipase.  Biliary stent in place.  Elevated alk phos, tho etiology unclear.     Kian Bennett MD
Metastatic ovarian cancer recent cholecystitis, stented presenting with hypotension, septicemia, pancreatitis.  Seen in ED, supervised volume resuscitation, AB regimen, rx with pressors and reviewed CT scan in depth with Dr. Rodriguez  1.  Septicemia--likely abdominal, GNR, anaerobic coverage.  BC  2.  Hypotension--rales. Check chest sono.  Hold IVF, start levofed  3.  Pancreatitis, acute--?biliary source.  Urgent GI consult for evaluation, possible diagnostic, therapeutic ERCP.
Pt seen with fellow.  Agree with above.  Goals and symptoms controlled.  Pt passed away peacefully.  Emotional and spiritual support provided to family

## 2018-04-26 NOTE — PROGRESS NOTE ADULT - SUBJECTIVE AND OBJECTIVE BOX
CHIEF COMPLAINT: Abdominal Pain , N/V, +Septic Shock 2/2 questionable E. Coli bacteremia, Cholecystitis, PNA    Interval Events: DNR / DNI this early AM     This is a 72yoF with recurrent metastatic ovarian Ca s/p DENNY / BSO - s/p chemo w recurrence 2017 - last chemo , recent admission dx w Brain Mets, E.coli Bacteremia, +cholecystitis s/p biliary drain- 4 weeks ago, and continues to encounter abdominal pain. CT was done: suspicion for adrenal mets, pancreatic mets vs pancreatitis, Septic shock suspicion for recurrent E. Coli bacteremia.     REVIEW OF SYSTEMS:  Constitutional: [ ] negative [ ] fevers [ ] chills [ ] weight loss [ ] weight gain  HEENT: [ ] negative [ ] dry eyes [ ] eye irritation [ ] postnasal drip [ ] nasal congestion  CV: [ ] negative  [ ] chest pain [ ] orthopnea [ ] palpitations [ ] murmur  Resp: [ ] negative [ ] cough [ ] shortness of breath [ ] dyspnea [ ] wheezing [ ] sputum [ ] hemoptysis  GI: [ ] negative [ ] nausea [ ] vomiting [ ] diarrhea [ ] constipation [ ] abd pain [ ] dysphagia   : [ ] negative [ ] dysuria [ ] nocturia [ ] hematuria [ ] increased urinary frequency  Musculoskeletal: [ ] negative [ ] back pain [ ] myalgias [ ] arthralgias [ ] fracture  Skin: [ ] negative [ ] rash [ ] itch  Neurological: [ ] negative [ ] headache [ ] dizziness [ ] syncope [ ] weakness [ ] numbness  Psychiatric: [ ] negative [ ] anxiety [ ] depression  Endocrine: [ ] negative [ ] diabetes [ ] thyroid problem  Hematologic/Lymphatic: [ ] negative [ ] anemia [ ] bleeding problem  Allergic/Immunologic: [ ] negative [ ] itchy eyes [ ] nasal discharge [ ] hives [ ] angioedema  [ ] All other systems negative  [ ] Unable to assess ROS because ________    OBJECTIVE:  ICU Vital Signs Last 24 Hrs  T(C): 36.7 (2018 07:15), Max: 38.1 (2018 10:45)  T(F): 98 (2018 07:15), Max: 100.5 (2018 10:45)  HR: 119 (2018 07:45) (100 - 126)  BP: 84/41 (2018 07:45) (72/40 - 145/68)  BP(mean): 59 (2018 07:45) (59 - 89)  ABP: --  ABP(mean): --  RR: 33 (2018 07:45) (15 - 38)  SpO2: 98% (2018 07:45) (89% - 100%)         @ 07:01  -   @ 07:00  --------------------------------------------------------  IN: 1835.6 mL / OUT: 445 mL / NET: 1390.6 mL      CAPILLARY BLOOD GLUCOSE      POCT Blood Glucose.: 199 mg/dL (2018 05:03)      PHYSICAL EXAM:  General:   HEENT:   Lymph Nodes:  Neck:   Respiratory:   Cardiovascular:   Abdomen:   Extremities:   Skin:   Neurological:  Psychiatry:    LINES:    HOSPITAL MEDICATIONS:  heparin  Infusion 16 Unit(s)/Hr IV Continuous <Continuous>    meropenem  IVPB      meropenem  IVPB 1000 milliGRAM(s) IV Intermittent every 12 hours    phenylephrine    Infusion 0.4 MICROgram(s)/kG/Min IV Continuous <Continuous>    dextrose 50% Injectable 12.5 Gram(s) IV Push once  dextrose 50% Injectable 25 Gram(s) IV Push once  dextrose 50% Injectable 25 Gram(s) IV Push once  dextrose Gel 1 Dose(s) Oral once PRN  glucagon  Injectable 1 milliGRAM(s) IntraMuscular once PRN  hydrocortisone sodium succinate Injectable 50 milliGRAM(s) IV Push every 8 hours  insulin lispro (HumaLOG) corrective regimen sliding scale   SubCutaneous every 6 hours    buDESOnide  80 MICROgram(s)/formoterol 4.5 MICROgram(s) Inhaler 2 Puff(s) Inhalation two times a day    acetaminophen   Tablet 650 milliGRAM(s) Oral every 6 hours PRN    senna 2 Tablet(s) Oral at bedtime PRN        dextrose 5%. 1000 milliLiter(s) IV Continuous <Continuous>  sodium chloride 0.9%. 1000 milliLiter(s) IV Continuous <Continuous>            LABS:                        9.2    29.1  )-----------( 395      ( 2018 01:47 )             29.4     Hgb Trend: 9.2<--, 10.5<--, 11.2<--, 11.4<--      136  |  98  |  42<H>  ----------------------------<  191<H>  4.4   |  25  |  2.10<H>    Ca    8.1<L>      2018 01:47  Phos  5.2       Mg     2.1         TPro  5.4<L>  /  Alb  1.8<L>  /  TBili  1.1  /  DBili  x   /  AST  56<H>  /  ALT  58<H>  /  AlkPhos  890<H>      Creatinine Trend: 2.10<--, 2.28<--, 2.24<--, 0.89<--, 0.91<--, 0.96<--  PT/INR - ( 2018 18:45 )   PT: 17.7 sec;   INR: 1.62 ratio         PTT - ( 2018 01:47 )  PTT:81.0 sec  Urinalysis Basic - ( 2018 15:18 )    Color: Yellow / Appearance: Turbid / S.029 / pH: x  Gluc: x / Ketone: Negative  / Bili: Small / Urobili: Negative   Blood: x / Protein: 100 mg/dL / Nitrite: Negative   Leuk Esterase: Negative / RBC: 0-2 /HPF / WBC 2-5 /HPF   Sq Epi: x / Non Sq Epi: x / Bacteria: x        Venous Blood Gas:   @ 18:33  7.35/51/29/27/42  VBG Lactate: 2.0  Venous Blood Gas:   @ 15:44  7.40/43/52//84  VBG Lactate: 3.0  Venous Blood Gas:   @ 10:46  7.45/38/39//68  VBG Lactate: 4.5      MICROBIOLOGY:   Urine Microscopic-Add On (NC) (18 @ 15:18)    Red Blood Cell - Urine: 0-2 /HPF    White Blood Cell - Urine: 2-5 /HPF    Culture - Fungal, Body Fluid (18 @ 19:05)    Specimen Source: .Body Fluid Peritoneal Fluid    Culture Results:   No fungus isolated at 1 week. No additional interim reports will be  issued unless there is a change in culture status.    RADIOLOGY:  < from: CT Abdomen and Pelvis No Cont (. @ 15:12) >    EXAM:  CT ABDOMEN AND PELVIS                            PROCEDURE DATE:  2018            INTERPRETATION:  CLINICAL INFORMATION: 72-year-old female with metastatic   ovarian cancer and COPD complaining of increasing abdominal pain and   distention.    COMPARISON: CT chest abdomen pelvis 2018    PROCEDURE:   CT of the Abdomen and Pelvis was performed without intravenous contrast.   Intravenous contrast: None.  Oral contrast: None.  Sagittal and coronal reformats were performed.    FINDINGS:    LOWER CHEST: Moderate right pleural effusion with partial compressive   atelectasis of the right lower lobe. Trace left pleural effusion.   Coronary artery calcifications and aortic valve calcifications.    LIVER: Within normal limits.  BILE DUCTS: CBD stent in place with trace left pneumobilia.  GALLBLADDER: Filled with stones.  SPLEEN: Within normal limits.  PANCREAS: There is marked peripancreatic inflammatory change, increased   compared to 2018. A 3.5 x 2.5 cm cystic lesion adjacent to the   pancreatic body is increased in size, previously 2.9 x 1.6 cm.  ADRENALS: Enlarged bilateral adrenal glands containing low-attenuation   lesions are again concerning for adrenal metastases.  KIDNEYS/URETERS: Within normal limits.    BLADDER: Within normal limits.  REPRODUCTIVE ORGANS: Status post hysterectomy. No adnexal mass.    BOWEL: Sigmoid diverticulosis. No bowel obstruction. Appendix is normal.  PERITONEUM: Moderate to large ascites, increased since 2018.  VESSELS:  Atheromatous disease of the abdominal aorta and branch arteries.  RETROPERITONEUM: Unchanged left para-aortic lymph nodes.    ABDOMINAL WALL: Postsurgical changes of the midline anterior abdominal   wall  BONES: Within normal limits.    IMPRESSION:    Marked peripancreatic inflammatory change is increased compared to   2018 and is concerning for acute pancreatitis. Increased size of   cystic lesion adjacent to the pancreatic body likely represents an acute   peripancreatic collection.    Moderate to large ascites is increased compared to 2018.    Distended gallbladder with numerous stones. If there is clinical concern   for acute cholecystitis, a HIDA scan may be obtained for further   evaluation.  Pneumobilia is consistent with the presence of a biliary stent.    Bilateral adrenal masses suspicious for metastatic disease.        EKG: CHIEF COMPLAINT: Abdominal Pain , +Septic Shock , Cholecystitis    Interval Events: DNR / DNI this early AM     This is a 72yoF with recurrent refractory metastatic ovarian Ca s/p DENNY / BSO - s/p chemo w recurrence  - last chemo 3/17, hx of COPD and DM2. The pt presented to the ED  w c/o SOB  and abdominal pain, was found to be hypotensive (SBP 70's), febrile (100.5), was given 3L IVF bolus, started on zosyn, and sent to the MICU for further management. Pt most recent admission - was also initially admitted for abd pain, SOB, whose hospital course was c/b a new finding of a Left temporal lobe metastasis, E. Coli bacteremia / E. Coli urosepsis tx w zosyn, (d/c home on cipro), BLE DVT's- (d/c home on apixaban), had significant ascites, underwent a paracentesis (1.5L removed) - tested + for malignant cells, +cholecystitis, underwent a HIDA scan -secondary biliary stent. In 2018 the pt was admitted to [facility] for jaundice 2/2 a biliary obstruction thought to be from a metastatic obstruction,   and a biliary stent was placed initially at that time.       REVIEW OF SYSTEMS:  Constitutional: [x ] negative [ ] fevers [ ] chills [ ] weight loss [ ] weight gain  HEENT: [x ] negative [ ] dry eyes [ ] eye irritation [ ] postnasal drip [ ] nasal congestion  CV: [ x] negative  [ ] chest pain [ ] orthopnea [ ] palpitations [ ] murmur  Resp: [x ] negative [ ] cough [ ] shortness of breath [ ] dyspnea [ ] wheezing [ ] sputum [ ] hemoptysis  GI: [ ] negative [ ] nausea [ ] vomiting [ ] diarrhea [ ] constipation [x ] abd pain [ ] dysphagia   : [ x] negative [ ] dysuria [ ] nocturia [ ] hematuria [ ] increased urinary frequency  Musculoskeletal: [x ] negative [ ] back pain [ ] myalgias [ ] arthralgias [ ] fracture  Skin: [x ] negative [ ] rash [ ] itch  Neurological: [ x] negative [ ] headache [ ] dizziness [ ] syncope [ ] weakness [ ] numbness  Psychiatric: [x ] negative [ ] anxiety [ ] depression  Endocrine: [ x] negative [ ] diabetes [ ] thyroid problem  Hematologic/Lymphatic: [ ] negative [ ] anemia [ ] bleeding problem  Allergic/Immunologic: [ x] negative [ ] itchy eyes [ ] nasal discharge [ ] hives [ ] angioedema  [ x] All other systems negative  [ ] Unable to assess ROS because ________    OBJECTIVE:  ICU Vital Signs Last 24 Hrs  T(C): 36.7 (2018 07:15), Max: 38.1 (2018 10:45)  T(F): 98 (2018 07:15), Max: 100.5 (2018 10:45)  HR: 119 (2018 07:45) (100 - 126)  BP: 84/41 (2018 07:45) (72/40 - 145/68)  BP(mean): 59 (2018 07:45) (59 - 89)  ABP: --  ABP(mean): --  RR: 33 (2018 07:45) (15 - 38)  SpO2: 98% (2018 07:45) (89% - 100%)         @ 07:01  -   @ 07:00  --------------------------------------------------------  IN: 1835.6 mL / OUT: 445 mL / NET: 1390.6 mL      CAPILLARY BLOOD GLUCOSE  POCT Blood Glucose.: 199 mg/dL (2018 05:03)    LINES: [facility] MEDICATIONS:  heparin  Infusion 16 Unit(s)/Hr IV Continuous <Continuous>    meropenem  IVPB      meropenem  IVPB 1000 milliGRAM(s) IV Intermittent every 12 hours    phenylephrine    Infusion 0.4 MICROgram(s)/kG/Min IV Continuous <Continuous>    dextrose 50% Injectable 12.5 Gram(s) IV Push once  dextrose 50% Injectable 25 Gram(s) IV Push once  dextrose 50% Injectable 25 Gram(s) IV Push once  dextrose Gel 1 Dose(s) Oral once PRN  glucagon  Injectable 1 milliGRAM(s) IntraMuscular once PRN  hydrocortisone sodium succinate Injectable 50 milliGRAM(s) IV Push every 8 hours  insulin lispro (HumaLOG) corrective regimen sliding scale   SubCutaneous every 6 hours    buDESOnide  80 MICROgram(s)/formoterol 4.5 MICROgram(s) Inhaler 2 Puff(s) Inhalation two times a day    acetaminophen   Tablet 650 milliGRAM(s) Oral every 6 hours PRN    senna 2 Tablet(s) Oral at bedtime PRN        dextrose 5%. 1000 milliLiter(s) IV Continuous <Continuous>  sodium chloride 0.9%. 1000 milliLiter(s) IV Continuous <Continuous>            LABS:                        9.2    29.1  )-----------( 395      ( 2018 01:47 )             29.4     Hgb Trend: 9.2<--, 10.5<--, 11.2<--, 11.4<--      136  |  98  |  42<H>  ----------------------------<  191<H>  4.4   |  25  |  2.10<H>    Ca    8.1<L>      2018 01:47  Phos  5.2       Mg     2.1         TPro  5.4<L>  /  Alb  1.8<L>  /  TBili  1.1  /  DBili  x   /  AST  56<H>  /  ALT  58<H>  /  AlkPhos  890<H>      Creatinine Trend: 2.10<--, 2.28<--, 2.24<--, 0.89<--, 0.91<--, 0.96<--  PT/INR - ( 2018 18:45 )   PT: 17.7 sec;   INR: 1.62 ratio         PTT - ( 2018 01:47 )  PTT:81.0 sec  Urinalysis Basic - ( 2018 15:18 )    Color: Yellow / Appearance: Turbid / S.029 / pH: x  Gluc: x / Ketone: Negative  / Bili: Small / Urobili: Negative   Blood: x / Protein: 100 mg/dL / Nitrite: Negative   Leuk Esterase: Negative / RBC: 0-2 /HPF / WBC 2-5 /HPF   Sq Epi: x / Non Sq Epi: x / Bacteria: x        Venous Blood Gas:   @ 18:33  7.35/51/29/27/42  VBG Lactate: 2.0  Venous Blood Gas:   @ 15:44  7.40/43/52//84  VBG Lactate: 3.0  Venous Blood Gas:   @ 10:46  7.45/38/39//68  VBG Lactate: 4.5      MICROBIOLOGY:   Urine Microscopic-Add On (NC) (18 @ 15:18)    Red Blood Cell - Urine: 0-2 /HPF    White Blood Cell - Urine: 2-5 /HPF    Culture - Fungal, Body Fluid (18 @ 19:05)    Specimen Source: .Body Fluid Peritoneal Fluid    Culture Results:   No fungus isolated at 1 week. No additional interim reports will be  issued unless there is a change in culture status.    RADIOLOGY:  < from: CT Abdomen and Pelvis No Cont (18 @ 15:12) >    EXAM:  CT ABDOMEN AND PELVIS                          PROCEDURE DATE:  2018        INTERPRETATION:  CLINICAL INFORMATION: 72-year-old female with metastatic   ovarian cancer and COPD complaining of increasing abdominal pain and   distention.    COMPARISON: CT chest abdomen pelvis 2018    PROCEDURE:   CT of the Abdomen and Pelvis was performed without intravenous contrast.   Intravenous contrast: None.  Oral contrast: None.  Sagittal and coronal reformats were performed.    FINDINGS:    LOWER CHEST: Moderate right pleural effusion with partial compressive   atelectasis of the right lower lobe. Trace left pleural effusion.   Coronary artery calcifications and aortic valve calcifications.    LIVER: Within normal limits.  BILE DUCTS: CBD stent in place with trace left pneumobilia.  GALLBLADDER: Filled with stones.  SPLEEN: Within normal limits.  PANCREAS: There is marked peripancreatic inflammatory change, increased   compared to 2018. A 3.5 x 2.5 cm cystic lesion adjacent to the   pancreatic body is increased in size, previously 2.9 x 1.6 cm.  ADRENALS: Enlarged bilateral adrenal glands containing low-attenuation   lesions are again concerning for adrenal metastases.  KIDNEYS/URETERS: Within normal limits.    BLADDER: Within normal limits.  REPRODUCTIVE ORGANS: Status post hysterectomy. No adnexal mass.    BOWEL: Sigmoid diverticulosis. No bowel obstruction. Appendix is normal.  PERITONEUM: Moderate to large ascites, increased since 2018.  VESSELS:  Atheromatous disease of the abdominal aorta and branch arteries.  RETROPERITONEUM: Unchanged left para-aortic lymph nodes.    ABDOMINAL WALL: Postsurgical changes of the midline anterior abdominal   wall  BONES: Within normal limits.    IMPRESSION:    Marked peripancreatic inflammatory change is increased compared to   2018 and is concerning for acute pancreatitis. Increased size of   cystic lesion adjacent to the pancreatic body likely represents an acute   peripancreatic collection.    Moderate to large ascites is increased compared to 2018.    Distended gallbladder with numerous stones. If there is clinical concern   for acute cholecystitis, a HIDA scan may be obtained for further   evaluation.  Pneumobilia is consistent with the presence of a biliary stent.    Bilateral adrenal masses suspicious for metastatic disease.        EKG:

## 2018-04-26 NOTE — PROGRESS NOTE ADULT - SUBJECTIVE AND OBJECTIVE BOX
SUBJECTIVE:  Continues with epigastric pain.  OBJECTIVE:  Abdomen soft, obese, distended, with epigastric tenderness.  MEDICATIONS  (STANDING):  buDESOnide  80 MICROgram(s)/formoterol 4.5 MICROgram(s) Inhaler 2 Puff(s) Inhalation two times a day  dextrose 5%. 1000 milliLiter(s) (50 mL/Hr) IV Continuous <Continuous>  dextrose 50% Injectable 12.5 Gram(s) IV Push once  dextrose 50% Injectable 25 Gram(s) IV Push once  dextrose 50% Injectable 25 Gram(s) IV Push once  heparin  Infusion 16 Unit(s)/Hr (16 mL/Hr) IV Continuous <Continuous>  hydrocortisone sodium succinate Injectable 50 milliGRAM(s) IV Push every 8 hours  insulin lispro (HumaLOG) corrective regimen sliding scale   SubCutaneous every 6 hours  meropenem  IVPB      meropenem  IVPB 1000 milliGRAM(s) IV Intermittent every 12 hours  phenylephrine    Infusion 0.4 MICROgram(s)/kG/Min (13.665 mL/Hr) IV Continuous <Continuous>  sodium chloride 0.9%. 1000 milliLiter(s) (75 mL/Hr) IV Continuous <Continuous>    MEDICATIONS  (PRN):  acetaminophen   Tablet 650 milliGRAM(s) Oral every 6 hours PRN For Temp greater than 38 C (100.4 F)  dextrose Gel 1 Dose(s) Oral once PRN Blood Glucose LESS THAN 70 milliGRAM(s)/deciliter  glucagon  Injectable 1 milliGRAM(s) IntraMuscular once PRN Glucose LESS THAN 70 milligrams/deciliter  senna 2 Tablet(s) Oral at bedtime PRN Constipation      Vital Signs Last 24 Hrs  T(C): 36.7 (2018 07:15), Max: 38.1 (2018 10:45)  T(F): 98 (2018 07:15), Max: 100.5 (2018 10:45)  HR: 119 (2018 07:45) (100 - 126)  BP: 84/41 (2018 07:45) (72/40 - 145/68)  BP(mean): 59 (2018 07:45) (59 - 89)  RR: 33 (2018 07:45) (15 - 38)  SpO2: 98% (2018 07:45) (89% - 100%)      I&O's Detail    2018 07:01  -  2018 07:00  --------------------------------------------------------  IN:    heparin  Infusion.: 96 mL    heparin Infusion: 80 mL    IV PiggyBack: 400 mL    Oral Fluid: 100 mL    phenylephrine   Infusion: 109.6 mL    sodium chloride 0.9%.: 1050 mL  Total IN: 1835.6 mL    OUT:    Indwelling Catheter - Urethral: 445 mL  Total OUT: 445 mL    Total NET: 1390.6 mL          LABS:                        9.2    29.1  )-----------( 395      ( 2018 01:47 )             29.4         136  |  98  |  42<H>  ----------------------------<  191<H>  4.4   |  25  |  2.10<H>    Ca    8.1<L>      2018 01:47  Phos  5.2       Mg     2.1         TPro  5.4<L>  /  Alb  1.8<L>  /  TBili  1.1  /  DBili  x   /  AST  56<H>  /  ALT  58<H>  /  AlkPhos  890<H>      PT/INR - ( 2018 18:45 )   PT: 17.7 sec;   INR: 1.62 ratio         PTT - ( 2018 01:47 )  PTT:81.0 sec  Urinalysis Basic - ( 2018 15:18 )    Color: Yellow / Appearance: Turbid / S.029 / pH: x  Gluc: x / Ketone: Negative  / Bili: Small / Urobili: Negative   Blood: x / Protein: 100 mg/dL / Nitrite: Negative   Leuk Esterase: Negative / RBC: 0-2 /HPF / WBC 2-5 /HPF   Sq Epi: x / Non Sq Epi: x / Bacteria: x        RADIOLOGY & ADDITIONAL STUDIES:

## 2018-04-26 NOTE — CONSULT NOTE ADULT - PROBLEM SELECTOR RECOMMENDATION 9
Patient was seen this am   She was currently on pressors and IVF.   She understood that without these interventions she would only have hours  She explained her goal was for comfort and inquired about hospice prior to her death  Daughter Keyla teague explained patient had been having UTI's and infectious like symptoms for almost 2 months. Agreed at that time w/ plan for comfort

## 2018-04-26 NOTE — PROGRESS NOTE ADULT - ASSESSMENT
1. Sepsis    -- suspect GI Source. CT w distended gall bladder, pancreatic edema and peripancreatic collection. Large Malignant Ascites  -- cont IV Meropenem  -- f/u cultures (recent e coli bacteremia)  -- GI Consult  -- As this is her 3rd admission for same issue, perhaps she would benefit from percut cholecystostomy drainage  -- surgery follow up  -- Rec therapeutic and diagnostic paracentesis please send fluid for cell count, gram stain and culture    2. Brain Mets    -- cont steroids  -- plan was for outpatient SRS    3. 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 new brain met. Last Chemo was Carboplatin + Doxil on 9/19/17. Refused Maintenance Olaparib. Recurrence noted on CT on 3/6/18.    -- pt  declined further chemo  -- supportive care    5. GOC    -- I had long discussion with patient, she told be she does not want any "extraordinary measures taken to prolong her life". Was clear today that with respect to her Ovarian Cancer she wants to "leave it alone, no more chemo".     I would recommend palliative care renee Fonseca MD  cell # [phone]

## 2018-04-26 NOTE — PROGRESS NOTE ADULT - SUBJECTIVE AND OBJECTIVE BOX
HPI:  72 year old female well known to me with platinum refractory metastatic ovarian cancer (Dx 2008, s/p DENNY/BSO, chemo with recurrencein 2017 completed Tx w Carboplatin+ Doxil on 9/19/17), COPD, presenting to ED on 4/25/18 w SOB, Abd distention and pain found to be hypotensive at home by visiting nurse.  Was hospitalized at [facility] bet 4/14-4/20/18 for E coli bacteremia/spesis.  The patient was found to have a new small L temporal lobe met, Rad Onc saw and recommended outpatient f/u. met and E. coli bacteremia. She was discharged on cipro to be completed on 4/27. 3 weeks prior to that admission she was admitted to [facility] for abdominal pain and jaundice. 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 She had ERCP and biliary stent placed.    Since d/c on 4/20 she reports increased abdominal pain and distension, as well as subjective fever. No chest pain/palpitations    Pt was admitted to MICU for further care      Pt is seen and examined out of bed to chair. Reports not feeling well. + Nausea and bilious vomiting. Weak. Abd discomfort      PAST MEDICAL & SURGICAL HISTORY:  Malignant neoplasm of ovary, unspecified laterality  S/P DENNY-BSO      ROS:  Negative except for: as above    MEDICATIONS  (STANDING):  buDESOnide  80 MICROgram(s)/formoterol 4.5 MICROgram(s) Inhaler 2 Puff(s) Inhalation two times a day  dextrose 5%. 1000 milliLiter(s) (50 mL/Hr) IV Continuous <Continuous>  dextrose 50% Injectable 12.5 Gram(s) IV Push once  dextrose 50% Injectable 25 Gram(s) IV Push once  dextrose 50% Injectable 25 Gram(s) IV Push once  heparin  Infusion 16 Unit(s)/Hr (16 mL/Hr) IV Continuous <Continuous>  hydrocortisone sodium succinate Injectable 50 milliGRAM(s) IV Push every 8 hours  insulin lispro (HumaLOG) corrective regimen sliding scale   SubCutaneous every 6 hours  meropenem  IVPB      meropenem  IVPB 1000 milliGRAM(s) IV Intermittent every 12 hours  phenylephrine    Infusion 0.4 MICROgram(s)/kG/Min (13.665 mL/Hr) IV Continuous <Continuous>  sodium chloride 0.9%. 1000 milliLiter(s) (75 mL/Hr) IV Continuous <Continuous>    MEDICATIONS  (PRN):  acetaminophen   Tablet 650 milliGRAM(s) Oral every 6 hours PRN For Temp greater than 38 C (100.4 F)  dextrose Gel 1 Dose(s) Oral once PRN Blood Glucose LESS THAN 70 milliGRAM(s)/deciliter  glucagon  Injectable 1 milliGRAM(s) IntraMuscular once PRN Glucose LESS THAN 70 milligrams/deciliter  senna 2 Tablet(s) Oral at bedtime PRN Constipation      Allergies    No Known Allergies    Intolerances        Vital Signs Last 24 Hrs  T(C): 36.8 (26 Apr 2018 04:00), Max: 38.1 (25 Apr 2018 10:45)  T(F): 98.2 (26 Apr 2018 04:00), Max: 100.5 (25 Apr 2018 10:45)  HR: 112 (26 Apr 2018 07:00) (100 - 126)  BP: 88/51 (26 Apr 2018 06:45) (72/40 - 145/68)  BP(mean): 65 (26 Apr 2018 06:45) (61 - 89)  RR: 31 (26 Apr 2018 07:00) (15 - 38)  SpO2: 98% (26 Apr 2018 07:00) (89% - 100%)    PHYSICAL EXAM    General: adult in NAD  HEENT: clear oropharynx, anicteric sclera, pink conjunctiva  Neck: supple  CV: RRR S1,S2+   Lungs: positive air movement b/l . Diminished BS at bases, R>L  Abdomen: softly distended, mild diffuse tenderness  Ext: no clubbing cyanosis or edema  Skin: no rashes and no petechiae  Neuro: alert and oriented X 4, no focal deficits        LABS:                          9.2    29.1  )-----------( 395      ( 26 Apr 2018 01:47 )             29.4     Serial CBC's  04-26 @ 01:47  Hct-29.4 / Hgb-9.2 / Plat-395 / RBC-3.07 / WBC-29.1          Serial CBC's  04-25 @ 18:45  Hct-32.7 / Hgb-10.5 / Plat-339 / RBC-3.42 / WBC-24.6            04-26    136  |  98  |  42<H>  ----------------------------<  191<H>  4.4   |  25  |  2.10<H>    Ca    8.1<L>      26 Apr 2018 01:47  Phos  5.2     04-26  Mg     2.1     04-26    TPro  5.4<L>  /  Alb  1.8<L>  /  TBili  1.1  /  DBili  x   /  AST  56<H>  /  ALT  58<H>  /  AlkPhos  890<H>  04-26      PT/INR - ( 25 Apr 2018 18:45 )   PT: 17.7 sec;   INR: 1.62 ratio         PTT - ( 26 Apr 2018 01:47 )  PTT:81.0 sec    WBC Count: 29.1 K/uL (04-26 @ 01:47)  Hemoglobin: 9.2 g/dL (04-26 @ 01:47)            RADIOLOGY & ADDITIONAL STUDIES:

## 2018-04-30 LAB
CULTURE RESULTS: SIGNIFICANT CHANGE UP
CULTURE RESULTS: SIGNIFICANT CHANGE UP
SPECIMEN SOURCE: SIGNIFICANT CHANGE UP
SPECIMEN SOURCE: SIGNIFICANT CHANGE UP

## 2018-05-16 LAB
CULTURE RESULTS: SIGNIFICANT CHANGE UP
SPECIMEN SOURCE: SIGNIFICANT CHANGE UP

## 2018-06-06 LAB
CULTURE RESULTS: SIGNIFICANT CHANGE UP
SPECIMEN SOURCE: SIGNIFICANT CHANGE UP

## 2021-12-10 NOTE — ED ADULT NURSE REASSESSMENT NOTE - NS ED NURSE REASSESS COMMENT FT1
pt taken to ct scan Action 4: Continue Detail Level: Zone Continue Regimen: Cyclosporine 100 mg PO BID\\ntopical dapsone to affected areas BID Continue Regimen: triamcinolone acetonide 0.1 % topical ointment apply twice daily to affected areas on abdomen for two weeks then as needed for itching.

## 2024-03-08 NOTE — PROGRESS NOTE ADULT - ATTENDING COMMENTS
Health Maintenance Due   Topic Date Due    Pneumococcal Vaccine 0-64 (1 of 2 - PCV) Never done    Influenza Vaccine (1) 09/01/2023    COVID-19 Vaccine (3 - 2023-24 season) 09/01/2023       Patient is due for topics as listed above but is not proceeding with Immunization(s) COVID-19, Influenza, and Pneumococcal at this time. DVT ppx  Discussed with PALOMA Pablo,   Internal Medicine

## 2024-06-14 NOTE — ED ADULT TRIAGE NOTE - RESPIRATORY RATE (BREATHS/MIN)
1st attempt. RN called patient,unable to leave a message, voicemail is full.     Thank you  Perlita Cary RN     24

## 2024-09-30 NOTE — ED ADULT NURSE NOTE - NS ED PATIENT SAFETY CONCERN
Vyvanse  Last filled 8/26 #30, 0 R  Last office visit 9/23  Has upcoming 10/23 visit scheduled with you.  Yue Wagner, RN  Care Coordination   No

## 2025-04-16 NOTE — ED ADULT TRIAGE NOTE - NS ED NURSE DIRECT TO ROOM YN
[de-identified] : Constitutional o Appearance : well-nourished, well developed, alert, in no acute distress Head and Face o Head :  Inspection : atraumatic, normocephalic o Face :  Inspection : no visible rash or discoloration Respiratory o Respiratory Effort: breathing unlabored Neurologic o Sensation : Normal sensation Psychiatric o Mood and Affect: mood normal, affect appropriate Lymphatic o Additional Nodes : No palpable lymph nodes present  Right Lower Extremity o Buttock : no tenderness, swelling or deformities o Right Hip :  Inspection/Palpation : well healed incisions from a hip fusion.   Range of Motion : restricted due to fusion, no motion  Stability : joint stability intact  Strength : extension, flexion, adduction, abduction, internal rotation and external rotation, 5/5 Tests: Julee's test negative   o Right Knee   Inspection/Palpation : , well-healed anterior incision, trophic changes, mild swelling,   Range of Motion : essentially no flexion, with significant crepitance, basically no patellofemoral glide   Stability : no valgus or varus instability present on provocative testing  Strength : flexion and extension 5/5  Tests and Signs : negative Anterior Drawer, negative Lachman, negative Olman   Left Lower Extremity o Buttock : no tenderness, swelling or deformities  o Left Hip :  Inspection/Palpation : no tenderness, no swelling or deformities  Range of Motion : full flexion and no rotation, painless, no crepitance  Stability : joint stability intact  Strength : extension, flexion, adduction, abduction, internal rotation and external rotation, 5/5  Tests: Julee's test negative   o Left Knee :  Inspection/Palpation :  no medial or lateral compartment tenderness, mild swelling, well-healed lateral incision, trophic changes, no erythema, no warmth Range of Motion : 0-120, patellofemoral crepitance, good patellofemoral glide   Stability : valgus and varus instability present on provocative testing  Strength : flexion and extension 5/5  Tests and Signs : negative Anterior Drawer, negative Lachman, negative Olman   Gait and Station: Gait: stiff on right due to hip fusion, walking with a cane in the right hand, no significant extremity swelling or lymphedema, good proprioception and balance, no foot drop bilaterally, 0.25 inch shortening on the right leg No